# Patient Record
Sex: FEMALE | Race: WHITE | NOT HISPANIC OR LATINO | Employment: UNEMPLOYED | URBAN - METROPOLITAN AREA
[De-identification: names, ages, dates, MRNs, and addresses within clinical notes are randomized per-mention and may not be internally consistent; named-entity substitution may affect disease eponyms.]

---

## 2015-12-30 LAB
EXTERNAL HIV CONFIRMATION: NORMAL
EXTERNAL HIV SCREEN: NORMAL

## 2017-05-02 ENCOUNTER — GENERIC CONVERSION - ENCOUNTER (OUTPATIENT)
Dept: OTHER | Facility: OTHER | Age: 57
End: 2017-05-02

## 2017-05-03 ENCOUNTER — GENERIC CONVERSION - ENCOUNTER (OUTPATIENT)
Dept: OTHER | Facility: OTHER | Age: 57
End: 2017-05-03

## 2017-05-08 ENCOUNTER — GENERIC CONVERSION - ENCOUNTER (OUTPATIENT)
Dept: OTHER | Facility: OTHER | Age: 57
End: 2017-05-08

## 2017-12-12 ENCOUNTER — GENERIC CONVERSION - ENCOUNTER (OUTPATIENT)
Dept: INTERNAL MEDICINE CLINIC | Facility: CLINIC | Age: 57
End: 2017-12-12

## 2018-01-02 ENCOUNTER — GENERIC CONVERSION - ENCOUNTER (OUTPATIENT)
Dept: INTERNAL MEDICINE CLINIC | Facility: CLINIC | Age: 58
End: 2018-01-02

## 2018-01-11 NOTE — MISCELLANEOUS
Chief Complaint  Chief Complaint Free Text Note Form: This patient does not qualify for a THOMAS because she was not in the hospital for 24 hours  Active Problems    1  Atrial fibrillation (427 31) (I48 91)   2  Benign essential hypertension (401 1) (I10)   3  Chronic anticoagulation (V58 61) (Z79 01)   4  Daytime somnolence (780 54) (R40 0)   5  Diabetes mellitus type 2, uncontrolled (250 02) (E11 65)   6  Encounter for screening mammogram for malignant neoplasm of breast (V76 12)   (Z12 31)   7  Need for influenza vaccination (V04 81) (Z23)   8  Need for Tdap vaccination (V06 1) (Z23)   9  Snoring (786 09) (R06 83)    Surgical History    1  History of Hysterectomy    Family History  Mother    1  Family history of hypertension (V17 49) (Z82 49)  Father    2  Family history of coronary artery disease (V17 3) (Z82 49)   3  Family history of diabetes mellitus (V18 0) (Z83 3)   4  Family history of hypertension (V17 49) (Z82 49)    Social History    · Current some day smoker (305 1) (F17 200)   · Rarely consumes alcohol (V49 89) (Z78 9)    Current Meds   1  No Reported Medications Recorded    Allergies    1   No Known Drug Allergies    Signatures   Electronically signed by : Yajaira Rowland, ; May  8 2017 11:35AM EST                       (Author)

## 2018-01-11 NOTE — MISCELLANEOUS
Provider Comments  Provider Comments:   Pt was a n/s  LMOM to call back and make new appt        Signatures   Electronically signed by : GIANLUCA Woods; Sep 14 2016 12:08PM EST                       (Author)

## 2018-01-13 NOTE — MISCELLANEOUS
Chief Complaint  Chief Complaint Free Text Note Form: The patient declined to make a THOMAS appointment because she is following up with her cardiologist       Active Problems    1  Atrial fibrillation (427 31) (I48 91)   2  Benign essential hypertension (401 1) (I10)   3  Chronic anticoagulation (V58 61) (Z79 01)   4  Daytime somnolence (780 54) (R40 0)   5  Diabetes mellitus type 2, uncontrolled (250 02) (E11 65)   6  Encounter for screening mammogram for malignant neoplasm of breast (V76 12)   (Z12 31)   7  Need for influenza vaccination (V04 81) (Z23)   8  Need for Tdap vaccination (V06 1) (Z23)   9  Snoring (786 09) (R06 83)    Surgical History    1  History of Hysterectomy    Family History  Mother    1  Family history of hypertension (V17 49) (Z82 49)  Father    2  Family history of coronary artery disease (V17 3) (Z82 49)   3  Family history of diabetes mellitus (V18 0) (Z83 3)   4  Family history of hypertension (V17 49) (Z82 49)    Social History    · Current some day smoker (305 1) (F17 210)   · Rarely consumes alcohol (V49 89) (Z78 9)    Current Meds   1  No Reported Medications Recorded    Allergies    1   No Known Drug Allergies    Signatures   Electronically signed by : Frances Hendricks, ; Oct  7 2016  9:19AM EST                       (Author)    Electronically signed by : EDMUNDO Palma ; Oct  7 2016 10:58AM EST                       (Author)

## 2018-01-13 NOTE — MISCELLANEOUS
Chief Complaint  Chief Complaint Free Text Note Form: No THOMAS was made for this patient because she never returned my three phone calls  Active Problems    1  Atrial fibrillation (427 31) (I48 91)   2  Benign essential hypertension (401 1) (I10)   3  Chronic anticoagulation (V58 61) (Z79 01)   4  Daytime somnolence (780 54) (R40 0)   5  Diabetes mellitus type 2, uncontrolled (250 02) (E11 65)   6  Encounter for screening mammogram for malignant neoplasm of breast (V76 12)   (Z12 31)   7  Need for influenza vaccination (V04 81) (Z23)   8  Need for Tdap vaccination (V06 1) (Z23)   9  Snoring (786 09) (R06 83)    Surgical History    1  History of Hysterectomy    Family History  Mother    1  Family history of hypertension (V17 49) (Z82 49)  Father    2  Family history of coronary artery disease (V17 3) (Z82 49)   3  Family history of diabetes mellitus (V18 0) (Z83 3)   4  Family history of hypertension (V17 49) (Z82 49)    Social History    · Current some day smoker (305 1) (F17 210)   · Rarely consumes alcohol (V49 89) (Z78 9)    Current Meds   1  No Reported Medications Recorded    Allergies    1   No Known Drug Allergies    Future Appointments    Date/Time Provider Specialty Site   09/13/2016 02:30 PM Radha Robles [facility]     Signatures   Electronically signed by : Ruth Ann Neal, ; Aug  9 2016  4:06PM EST                       (Author)    Electronically signed by : EDMUNDO Lucas ; Aug 10 2016  5:56PM EST                       (Author)

## 2018-01-16 NOTE — MISCELLANEOUS
History of Present Illness  TCM Communication [facility]: The patient is being contacted for follow-up after hospitalization  Hospital records were reviewed  She was hospitalized at [facility]  The date of admission: 09/22/2016, date of discharge: 09/23/2016  Diagnosis: Persistant atrial fibrillation  She was discharged to home  Medications were not reviewed today  She scheduled a follow up appointment  The patient is currently asymptomatic  Counseling was provided to the patient  Communication performed and completed by Creasie Jeans      Active Problems    1  Atrial fibrillation (427 31) (I48 91)   2  Benign essential hypertension (401 1) (I10)   3  Chronic anticoagulation (V58 61) (Z79 01)   4  Daytime somnolence (780 54) (R40 0)   5  Diabetes mellitus type 2, uncontrolled (250 02) (E11 65)   6  Encounter for screening mammogram for malignant neoplasm of breast (V76 12)   (Z12 31)   7  Need for influenza vaccination (V04 81) (Z23)   8  Need for Tdap vaccination (V06 1) (Z23)   9  Snoring (786 09) (R06 83)    Surgical History    1  History of Hysterectomy    Family History  Mother    1  Family history of hypertension (V17 49) (Z82 49)  Father    2  Family history of coronary artery disease (V17 3) (Z82 49)   3  Family history of diabetes mellitus (V18 0) (Z83 3)   4  Family history of hypertension (V17 49) (Z82 49)    Social History    · Current some day smoker (305 1) (F17 210)   · Rarely consumes alcohol (V49 89) (Z78 9)    Current Meds   1  No Reported Medications Recorded    Allergies    1   No Known Drug Allergies    Future Appointments    Date/Time Provider Specialty Site   09/30/2016 11:00 AM Radha [place] [facility]     Signatures   Electronically signed by : Creasie Jeans, ; Sep 26 2016 12:02PM EST                       (Author)

## 2018-10-29 ENCOUNTER — TELEPHONE (OUTPATIENT)
Dept: FAMILY MEDICINE CLINIC | Facility: CLINIC | Age: 58
End: 2018-10-29

## 2018-10-29 LAB — HCV AB SER-ACNC: NEGATIVE

## 2018-10-29 NOTE — TELEPHONE ENCOUNTER
PATIENT IS  [facility] AND HAD SURGERY AT [facility]  HAS A  WOUND WRAP FOR  INFECTION IN HER GROIN  PATIENT STATED SHE IS GOING TO NEED A PRESCRIPTION FOR A LIFT CHAIR, WALKER AND SHOWER CHAIR WHEN SHE IS DISCHARGED HOPEFULLY IN THE NEXT 2 DAYS  INSURANCE IS [employer]     SPOUSE NUMBER JUST IN CASE   [phone]  EMRE IS REQUESTING A PERSONAL CALL FROM YOU TO GO OVER SOMETHINGS BEFORE SHE IS D/C     PLEASE ADVISE

## 2018-11-02 NOTE — TELEPHONE ENCOUNTER
Can you call her and tell her the  there will order all the supplies and the provider who took care of her thetre would order  I can call her when I get back on Monday but please let her know rehab there will arrange the discharge plan stuff   reji

## 2018-11-05 ENCOUNTER — TELEPHONE (OUTPATIENT)
Dept: FAMILY MEDICINE CLINIC | Facility: CLINIC | Age: 58
End: 2018-11-05

## 2018-11-06 ENCOUNTER — OFFICE VISIT (OUTPATIENT)
Dept: FAMILY MEDICINE CLINIC | Facility: CLINIC | Age: 58
End: 2018-11-06
Payer: COMMERCIAL

## 2018-11-06 VITALS
SYSTOLIC BLOOD PRESSURE: 130 MMHG | HEIGHT: 63 IN | WEIGHT: 204.6 LBS | OXYGEN SATURATION: 98 % | DIASTOLIC BLOOD PRESSURE: 82 MMHG | RESPIRATION RATE: 16 BRPM | BODY MASS INDEX: 36.25 KG/M2 | HEART RATE: 67 BPM | TEMPERATURE: 97.8 F

## 2018-11-06 DIAGNOSIS — M72.6 NECROTIZING FASCIITIS (HCC): ICD-10-CM

## 2018-11-06 DIAGNOSIS — Z79.01 CHRONIC ANTICOAGULATION: ICD-10-CM

## 2018-11-06 DIAGNOSIS — B37.9 CANDIDIASIS: Primary | ICD-10-CM

## 2018-11-06 DIAGNOSIS — I50.32 CHRONIC DIASTOLIC HEART FAILURE (HCC): ICD-10-CM

## 2018-11-06 DIAGNOSIS — I10 ESSENTIAL HYPERTENSION: ICD-10-CM

## 2018-11-06 DIAGNOSIS — I48.19 PERSISTENT ATRIAL FIBRILLATION (HCC): ICD-10-CM

## 2018-11-06 PROBLEM — R41.3 MEMORY LOSS: Status: ACTIVE | Noted: 2018-07-10

## 2018-11-06 PROBLEM — Z79.899 ON AMIODARONE THERAPY: Status: ACTIVE | Noted: 2017-05-02

## 2018-11-06 PROBLEM — R53.81 DEBILITY: Status: ACTIVE | Noted: 2018-10-26

## 2018-11-06 PROBLEM — R20.8 VIBRATION SENSORY LOSS: Status: ACTIVE | Noted: 2018-07-10

## 2018-11-06 PROBLEM — L03.90 CELLULITIS: Status: ACTIVE | Noted: 2018-10-14

## 2018-11-06 PROCEDURE — 99214 OFFICE O/P EST MOD 30 MIN: CPT | Performed by: NURSE PRACTITIONER

## 2018-11-06 PROCEDURE — 3725F SCREEN DEPRESSION PERFORMED: CPT | Performed by: NURSE PRACTITIONER

## 2018-11-06 PROCEDURE — 3079F DIAST BP 80-89 MM HG: CPT | Performed by: NURSE PRACTITIONER

## 2018-11-06 PROCEDURE — 3075F SYST BP GE 130 - 139MM HG: CPT | Performed by: NURSE PRACTITIONER

## 2018-11-06 PROCEDURE — 3008F BODY MASS INDEX DOCD: CPT | Performed by: NURSE PRACTITIONER

## 2018-11-06 RX ORDER — NYSTATIN 100000 [USP'U]/G
POWDER TOPICAL 3 TIMES DAILY
Qty: 45 G | Refills: 2 | Status: SHIPPED | OUTPATIENT
Start: 2018-11-06 | End: 2019-10-16 | Stop reason: ALTCHOICE

## 2018-11-06 RX ORDER — LANCETS
EACH MISCELLANEOUS
COMMUNITY
Start: 2016-08-07 | End: 2018-12-20 | Stop reason: SDUPTHER

## 2018-11-06 RX ORDER — METOPROLOL TARTRATE 50 MG/1
25 TABLET, FILM COATED ORAL 2 TIMES DAILY
COMMUNITY
Start: 2018-07-02

## 2018-11-06 RX ORDER — ATORVASTATIN CALCIUM 80 MG/1
80 TABLET, FILM COATED ORAL
COMMUNITY
Start: 2017-05-03

## 2018-11-06 RX ORDER — BLOOD SUGAR DIAGNOSTIC
STRIP MISCELLANEOUS
COMMUNITY
Start: 2016-08-07 | End: 2018-12-11 | Stop reason: SDUPTHER

## 2018-11-06 RX ORDER — ACETAMINOPHEN 500 MG
500 TABLET ORAL EVERY 6 HOURS PRN
COMMUNITY
Start: 2018-10-31

## 2018-11-06 RX ORDER — FUROSEMIDE 40 MG/1
40 TABLET ORAL
COMMUNITY
Start: 2018-04-12

## 2018-11-06 RX ORDER — LISINOPRIL 20 MG/1
20 TABLET ORAL
COMMUNITY
Start: 2018-04-12

## 2018-11-06 RX ORDER — AMLODIPINE BESYLATE 5 MG/1
10 TABLET ORAL
COMMUNITY
Start: 2018-10-31 | End: 2019-10-16 | Stop reason: SDUPTHER

## 2018-11-06 RX ORDER — AMIODARONE HYDROCHLORIDE 200 MG/1
200 TABLET ORAL
COMMUNITY
Start: 2017-11-27 | End: 2019-02-18

## 2018-11-06 RX ORDER — OXYCODONE HYDROCHLORIDE 5 MG/1
TABLET ORAL
COMMUNITY
Start: 2018-10-31 | End: 2019-02-18

## 2018-11-06 NOTE — PROGRESS NOTES
Assessment/Plan:           Problem List Items Addressed This Visit        Cardiovascular and Mediastinum    Chronic diastolic heart failure (HCC)    Relevant Medications    amLODIPine (NORVASC) 5 mg tablet    metoprolol tartrate (LOPRESSOR) 50 mg tablet    Essential hypertension    Relevant Medications    amLODIPine (NORVASC) 5 mg tablet    furosemide (LASIX) 40 mg tablet    lisinopril (ZESTRIL) 20 mg tablet    metoprolol tartrate (LOPRESSOR) 50 mg tablet    Persistent atrial fibrillation (HCC)    Relevant Medications    amLODIPine (NORVASC) 5 mg tablet    metoprolol tartrate (LOPRESSOR) 50 mg tablet       Musculoskeletal and Integument    Necrotizing fasciitis (HCC)       Other    Chronic anticoagulation      Other Visit Diagnoses     Candidiasis    -  Primary    Relevant Medications    nystatin (MYCOSTATIN) powder            Subjective:      Patient ID: Dano Rodrigues is a 66 y o  female  Here for hospital follow up  Had necrotizing fasciitis with debridement  Has appt with   trauma center on [address] to manage her abd wound  Needs order for nystatin powder   Right groin infection with vac dressing  Had been ventilated  For one week per pt  Went to [facility] for rehab and now home  vna coming into the home for vac care and wound care  Physical therapy came and she is done with them, not using walker any more          The following portions of the patient's history were reviewed and updated as appropriate: allergies, current medications, past family history, past medical history, past social history, past surgical history and problem list     Review of Systems   Constitutional: Positive for fatigue  Negative for fever  HENT: Negative for congestion and ear pain  Eyes: Negative for photophobia and visual disturbance  Respiratory: Negative for shortness of breath and wheezing  Cardiovascular: Negative for chest pain and palpitations  Gastrointestinal: Negative for diarrhea, nausea and vomiting  Endocrine: Negative for polydipsia, polyphagia and polyuria  Genitourinary: Negative for dysuria, frequency and hematuria  Musculoskeletal: Negative for back pain and myalgias  Skin: Positive for rash  Neurological: Negative for dizziness, light-headedness and headaches  Hematological: Negative for adenopathy  Psychiatric/Behavioral: Negative for dysphoric mood and sleep disturbance  The patient is not nervous/anxious  Objective:      /82 (BP Location: Right arm, Patient Position: Sitting, Cuff Size: Large)   Pulse 67   Temp 97 8 °F (36 6 °C) (Tympanic)   Resp 16   Ht 5' 3" (1 6 m)   Wt 92 8 kg (204 lb 9 6 oz)   SpO2 98%   BMI 36 24 kg/m²     Family History   Problem Relation Age of Onset    Hypertension Mother     Coronary artery disease Father     Diabetes Father     Hypertension Father      History reviewed  No pertinent past medical history  Social History     Social History    Marital status:      Spouse name: N/A    Number of children: N/A    Years of education: N/A     Occupational History    Not on file  Social History Main Topics    Smoking status: Current Every Day Smoker    Smokeless tobacco: Not on file    Alcohol use Yes      Comment: Rarely consumes     Drug use: Unknown    Sexual activity: Not on file     Other Topics Concern    Not on file     Social History Narrative    No narrative on file       Current Outpatient Prescriptions:     ACCU-CHEK FASTCLIX LANCETS MISC, Lancets for lancing device with Accu-chek Radha plus meter  Test 2XD   E 11 65, Disp: , Rfl:     acetaminophen (TYLENOL) 500 mg tablet, Take 500 mg by mouth every 6 (six) hours as needed, Disp: , Rfl:     amiodarone 200 mg tablet, Take 200 mg by mouth, Disp: , Rfl:     amLODIPine (NORVASC) 5 mg tablet, Take 10 mg by mouth, Disp: , Rfl:     apixaban (ELIQUIS) 5 mg, Take 5 mg by mouth, Disp: , Rfl:     atorvastatin (LIPITOR) 80 mg tablet, Take 80 mg by mouth, Disp: , Rfl:   furosemide (LASIX) 40 mg tablet, Take 40 mg by mouth, Disp: , Rfl:     glucose blood (ACCU-CHEK MONICA PLUS) test strip, Use with Accu-chek monica plus meter  Test 2XD  E11 65, Disp: , Rfl:     insulin glargine (BASAGLAR KWIKPEN) 100 units/mL injection pen, Inject 25 Units under the skin, Disp: , Rfl:     insulin lispro (HumaLOG) 100 units/mL injection, Inject 5 Units under the skin, Disp: , Rfl:     Insulin Syringe-Needle U-100 31G X 5/16" 0 3 ML MISC, Use with insulin 1XD  , Disp: , Rfl:     lisinopril (ZESTRIL) 20 mg tablet, Take 20 mg by mouth, Disp: , Rfl:     metoprolol tartrate (LOPRESSOR) 50 mg tablet, Take 75 mg by mouth, Disp: , Rfl:     oxyCODONE (ROXICODONE) 5 mg immediate release tablet, Take one tab every 4 hours as needed for moderate to severe pain  (Use on vac change days), Disp: , Rfl:   No Known Allergies     Physical Exam   Constitutional: She is oriented to person, place, and time  She appears well-developed and well-nourished  HENT:   Head: Normocephalic and atraumatic  Right Ear: External ear normal    Left Ear: External ear normal    Nose: Nose normal    Mouth/Throat: Oropharynx is clear and moist    Eyes: Conjunctivae are normal    Neck: Normal range of motion  Neck supple  No thyromegaly present  Cardiovascular: Normal rate, regular rhythm and normal heart sounds  Pulmonary/Chest: Effort normal and breath sounds normal  No respiratory distress  Abdominal: Soft  Bowel sounds are normal  She exhibits no distension  Musculoskeletal: Normal range of motion  Neurological: She is alert and oriented to person, place, and time  Skin:        Wound vac applied  Minor macerated tissue noted     Nursing note and vitals reviewed

## 2018-11-12 DIAGNOSIS — E11.65 TYPE 2 DIABETES MELLITUS WITH HYPERGLYCEMIA, WITHOUT LONG-TERM CURRENT USE OF INSULIN (HCC): Primary | ICD-10-CM

## 2018-12-11 ENCOUNTER — OFFICE VISIT (OUTPATIENT)
Dept: FAMILY MEDICINE CLINIC | Facility: CLINIC | Age: 58
End: 2018-12-11
Payer: COMMERCIAL

## 2018-12-11 VITALS
HEART RATE: 80 BPM | TEMPERATURE: 96.5 F | DIASTOLIC BLOOD PRESSURE: 78 MMHG | SYSTOLIC BLOOD PRESSURE: 116 MMHG | WEIGHT: 205 LBS | HEIGHT: 63 IN | RESPIRATION RATE: 16 BRPM | OXYGEN SATURATION: 97 % | BODY MASS INDEX: 36.32 KG/M2

## 2018-12-11 DIAGNOSIS — Z12.11 SCREENING FOR COLON CANCER: ICD-10-CM

## 2018-12-11 DIAGNOSIS — Z79.4 TYPE 2 DIABETES MELLITUS WITH OTHER CIRCULATORY COMPLICATION, WITH LONG-TERM CURRENT USE OF INSULIN (HCC): ICD-10-CM

## 2018-12-11 DIAGNOSIS — E13.8 DIABETES MELLITUS OF OTHER TYPE WITH COMPLICATION, UNSPECIFIED WHETHER LONG TERM INSULIN USE: Primary | ICD-10-CM

## 2018-12-11 DIAGNOSIS — I48.0 PAROXYSMAL ATRIAL FIBRILLATION (HCC): ICD-10-CM

## 2018-12-11 DIAGNOSIS — I10 BENIGN ESSENTIAL HYPERTENSION: Primary | ICD-10-CM

## 2018-12-11 DIAGNOSIS — I10 ESSENTIAL HYPERTENSION: ICD-10-CM

## 2018-12-11 DIAGNOSIS — E11.59 TYPE 2 DIABETES MELLITUS WITH OTHER CIRCULATORY COMPLICATION, WITH LONG-TERM CURRENT USE OF INSULIN (HCC): ICD-10-CM

## 2018-12-11 PROBLEM — L03.90 CELLULITIS: Status: RESOLVED | Noted: 2018-10-14 | Resolved: 2018-12-11

## 2018-12-11 PROBLEM — R53.81 DEBILITY: Status: RESOLVED | Noted: 2018-10-26 | Resolved: 2018-12-11

## 2018-12-11 PROBLEM — M72.6 NECROTIZING FASCIITIS (HCC): Status: RESOLVED | Noted: 2018-10-15 | Resolved: 2018-12-11

## 2018-12-11 PROCEDURE — 3008F BODY MASS INDEX DOCD: CPT | Performed by: NURSE PRACTITIONER

## 2018-12-11 PROCEDURE — 3078F DIAST BP <80 MM HG: CPT | Performed by: NURSE PRACTITIONER

## 2018-12-11 PROCEDURE — 3074F SYST BP LT 130 MM HG: CPT | Performed by: NURSE PRACTITIONER

## 2018-12-11 PROCEDURE — 99214 OFFICE O/P EST MOD 30 MIN: CPT | Performed by: NURSE PRACTITIONER

## 2018-12-11 RX ORDER — ATORVASTATIN CALCIUM 80 MG/1
TABLET, FILM COATED ORAL
COMMUNITY
Start: 2018-12-03 | End: 2019-02-18

## 2018-12-11 RX ORDER — BLOOD SUGAR DIAGNOSTIC
STRIP MISCELLANEOUS AS NEEDED
Qty: 180 EACH | Refills: 3 | Status: SHIPPED | OUTPATIENT
Start: 2018-12-11 | End: 2019-08-28

## 2018-12-11 RX ORDER — AMIODARONE HYDROCHLORIDE 200 MG/1
200 TABLET ORAL
COMMUNITY
Start: 2018-12-03 | End: 2019-02-18

## 2018-12-11 NOTE — PROGRESS NOTES
Assessment/Plan:         Problem List Items Addressed This Visit        Endocrine    Diabetes mellitus with circulatory complication (HCC)     No results found for: HGBA1C    No results for input(s): POCGLU in the last 72 hours  Blood Sugar Average: Last 72 hrs:  cont current meds  labs              Cardiovascular and Mediastinum    Atrial fibrillation (HCC)     Cont current meds  Rate controlled           Benign essential hypertension - Primary    Essential hypertension     Controlled cont current meds                   Subjective:      Patient ID: Gutierrez Izquierdo is a 66 y o  female  Here for f/u to hx of wound from necrotizing fasciitis  Has a wound vac still, follows with surgery and has home care nurse  Hope to get removed next week, to see surgery next week  Doing well  Diabetes controlled, states am bs around 120 on average  No fevers  Feels well  More energy           The following portions of the patient's history were reviewed and updated as appropriate: allergies, current medications, past family history, past medical history, past social history, past surgical history and problem list     Review of Systems   Constitutional: Negative for activity change, appetite change, fatigue and fever  HENT: Negative for congestion, postnasal drip, rhinorrhea, sinus pain and sinus pressure  Eyes: Negative for photophobia and visual disturbance  Respiratory: Negative for cough, shortness of breath and wheezing  Cardiovascular: Negative for chest pain and palpitations  Gastrointestinal: Negative for abdominal distention, abdominal pain, constipation, diarrhea, nausea and vomiting  Endocrine: Negative for polydipsia, polyphagia and polyuria  Genitourinary: Negative for dysuria, frequency and hematuria  Musculoskeletal: Negative for arthralgias, myalgias and neck stiffness  Skin: Negative for rash  Neurological: Negative for dizziness, light-headedness and headaches     Hematological: Negative for adenopathy  Psychiatric/Behavioral: Negative for agitation, decreased concentration and suicidal ideas  The patient is not nervous/anxious and is not hyperactive  Objective:      /78 (BP Location: Left arm, Patient Position: Sitting, Cuff Size: Standard)   Pulse 80   Temp (!) 96 5 °F (35 8 °C)   Resp 16   Ht 5' 3" (1 6 m)   Wt 93 kg (205 lb)   SpO2 97%   BMI 36 31 kg/m²     Family History   Problem Relation Age of Onset    Hypertension Mother     Coronary artery disease Father     Diabetes Father     Hypertension Father      No past medical history on file  Social History     Social History    Marital status:      Spouse name: N/A    Number of children: N/A    Years of education: N/A     Occupational History    Not on file  Social History Main Topics    Smoking status: Current Every Day Smoker    Smokeless tobacco: Not on file    Alcohol use Yes      Comment: Rarely consumes     Drug use: Unknown    Sexual activity: Not on file     Other Topics Concern    Not on file     Social History Narrative    No narrative on file       Current Outpatient Prescriptions:     ACCU-CHEK FASTCLIX LANCETS MISC, Lancets for lancing device with Accu-chek Jose G plus meter  Test 2XD   E 11 65, Disp: , Rfl:     acetaminophen (TYLENOL) 500 mg tablet, Take 500 mg by mouth every 6 (six) hours as needed, Disp: , Rfl:     amiodarone 200 mg tablet, Take 200 mg by mouth, Disp: , Rfl:     amiodarone 200 mg tablet, Take 200 mg by mouth, Disp: , Rfl:     amLODIPine (NORVASC) 5 mg tablet, Take 10 mg by mouth, Disp: , Rfl:     apixaban (ELIQUIS) 5 mg, Take 5 mg by mouth, Disp: , Rfl:     atorvastatin (LIPITOR) 80 mg tablet, Take 80 mg by mouth, Disp: , Rfl:     atorvastatin (LIPITOR) 80 mg tablet, TAKE ONE TABLET BY MOUTH NIGHTLY, Disp: , Rfl:     furosemide (LASIX) 40 mg tablet, Take 40 mg by mouth, Disp: , Rfl:     glucose blood (ACCU-CHEK JOSE G PLUS) test strip, Use with Accu-248 [employer] jose g plus meter  Test 2XD  E11 65, Disp: , Rfl:     glucose blood test strip, Use as instructed, Disp: 100 each, Rfl: 5    insulin glargine (BASAGLAR KWIKPEN) 100 units/mL injection pen, Inject 25 Units under the skin, Disp: , Rfl:     insulin lispro (HumaLOG) 100 units/mL injection, Inject 5 Units under the skin, Disp: , Rfl:     Insulin Syringe-Needle U-100 31G X 5/16" 0 3 ML MISC, Use with insulin 1XD  , Disp: , Rfl:     lisinopril (ZESTRIL) 20 mg tablet, Take 20 mg by mouth, Disp: , Rfl:     metoprolol tartrate (LOPRESSOR) 50 mg tablet, Take 75 mg by mouth, Disp: , Rfl:     nystatin (MYCOSTATIN) powder, Apply topically 3 (three) times a day, Disp: 45 g, Rfl: 2    oxyCODONE (ROXICODONE) 5 mg immediate release tablet, Take one tab every 4 hours as needed for moderate to severe pain  (Use on vac change days), Disp: , Rfl:   No Known Allergies     Physical Exam   Constitutional: She is oriented to person, place, and time  She appears well-developed and well-nourished  HENT:   Head: Normocephalic and atraumatic  Right Ear: External ear normal    Left Ear: External ear normal    Nose: Nose normal    Mouth/Throat: Oropharynx is clear and moist    Eyes: Pupils are equal, round, and reactive to light  Conjunctivae are normal    Neck: Normal range of motion  Neck supple  No thyromegaly present  Cardiovascular: Normal rate, regular rhythm, normal heart sounds and intact distal pulses  Pulmonary/Chest: Effort normal and breath sounds normal    Abdominal: Soft  Bowel sounds are normal        Musculoskeletal: Normal range of motion  Neurological: She is alert and oriented to person, place, and time  Skin: Skin is warm and dry  Wound vac intact  Groin no erythema, edema     Psychiatric: She has a normal mood and affect  Her behavior is normal  Judgment and thought content normal    Nursing note and vitals reviewed

## 2018-12-11 NOTE — ASSESSMENT & PLAN NOTE
No results found for: HGBA1C    No results for input(s): POCGLU in the last 72 hours      Blood Sugar Average: Last 72 hrs:  cont current meds  labs

## 2018-12-20 DIAGNOSIS — E11.9 TYPE 2 DIABETES MELLITUS WITHOUT COMPLICATION, WITHOUT LONG-TERM CURRENT USE OF INSULIN (HCC): Primary | ICD-10-CM

## 2018-12-20 RX ORDER — LANCETS
EACH MISCELLANEOUS DAILY
Qty: 102 EACH | Refills: 11 | Status: SHIPPED | OUTPATIENT
Start: 2018-12-20 | End: 2018-12-24 | Stop reason: ALTCHOICE

## 2018-12-24 DIAGNOSIS — E11.59 TYPE 2 DIABETES MELLITUS WITH OTHER CIRCULATORY COMPLICATION, WITHOUT LONG-TERM CURRENT USE OF INSULIN (HCC): Primary | ICD-10-CM

## 2018-12-24 RX ORDER — LANCETS
EACH MISCELLANEOUS
Qty: 100 EACH | Refills: 5 | Status: SHIPPED | OUTPATIENT
Start: 2018-12-24 | End: 2019-08-28 | Stop reason: SDUPTHER

## 2018-12-24 NOTE — TELEPHONE ENCOUNTER
Ivan FASTCLIX Lancets were sent to [facility] with a dose of once daily  Samara Dakin did pick them up NOT knowing they were the wrong ones  She asked if you could please send:    IVAN "[address] " LANCETS, to be used b i d , #100  To [facility] in her chart    She only has 2 left  Thank you!

## 2018-12-26 ENCOUNTER — TELEPHONE (OUTPATIENT)
Dept: FAMILY MEDICINE CLINIC | Facility: CLINIC | Age: 58
End: 2018-12-26

## 2018-12-26 NOTE — TELEPHONE ENCOUNTER
Patient spoke with insurance company, they did an override and everything is supposed to be good with [address]

## 2018-12-26 NOTE — TELEPHONE ENCOUNTER
Patient called saying [facility] never received the Accu-check SOFTCLIX lancets  I called [facility] and they notified me since she already picked up the wrong lancets (hardclix) that it is too soon for them to fill script  They said that a prior authorization may help  Please advise      Prior Auth given to [id number]

## 2019-02-12 PROBLEM — E66.812 CLASS 2 SEVERE OBESITY DUE TO EXCESS CALORIES WITH SERIOUS COMORBIDITY AND BODY MASS INDEX (BMI) OF 36.0 TO 36.9 IN ADULT (HCC): Status: ACTIVE | Noted: 2019-02-12

## 2019-02-12 PROBLEM — E66.01 CLASS 2 SEVERE OBESITY DUE TO EXCESS CALORIES WITH SERIOUS COMORBIDITY AND BODY MASS INDEX (BMI) OF 36.0 TO 36.9 IN ADULT (HCC): Status: ACTIVE | Noted: 2019-02-12

## 2019-02-18 ENCOUNTER — OFFICE VISIT (OUTPATIENT)
Dept: FAMILY MEDICINE CLINIC | Facility: CLINIC | Age: 59
End: 2019-02-18
Payer: COMMERCIAL

## 2019-02-18 VITALS
RESPIRATION RATE: 12 BRPM | DIASTOLIC BLOOD PRESSURE: 80 MMHG | HEIGHT: 63 IN | HEART RATE: 71 BPM | BODY MASS INDEX: 36 KG/M2 | OXYGEN SATURATION: 96 % | WEIGHT: 203.2 LBS | SYSTOLIC BLOOD PRESSURE: 124 MMHG

## 2019-02-18 DIAGNOSIS — Z12.11 COLON CANCER SCREENING: ICD-10-CM

## 2019-02-18 DIAGNOSIS — Z12.11 SCREENING FOR COLON CANCER: ICD-10-CM

## 2019-02-18 DIAGNOSIS — I50.32 CHRONIC DIASTOLIC HEART FAILURE (HCC): ICD-10-CM

## 2019-02-18 DIAGNOSIS — E08.51 DIABETES MELLITUS DUE TO UNDERLYING CONDITION WITH DIABETIC PERIPHERAL ANGIOPATHY WITHOUT GANGRENE, WITHOUT LONG-TERM CURRENT USE OF INSULIN (HCC): Primary | ICD-10-CM

## 2019-02-18 DIAGNOSIS — Z12.31 ENCOUNTER FOR SCREENING MAMMOGRAM FOR MALIGNANT NEOPLASM OF BREAST: ICD-10-CM

## 2019-02-18 DIAGNOSIS — E78.5 DYSLIPIDEMIA: ICD-10-CM

## 2019-02-18 DIAGNOSIS — I10 ESSENTIAL HYPERTENSION: ICD-10-CM

## 2019-02-18 DIAGNOSIS — E66.01 CLASS 2 SEVERE OBESITY DUE TO EXCESS CALORIES WITH SERIOUS COMORBIDITY AND BODY MASS INDEX (BMI) OF 36.0 TO 36.9 IN ADULT (HCC): ICD-10-CM

## 2019-02-18 PROBLEM — Z86.79 HISTORY OF ATRIAL FIBRILLATION: Status: ACTIVE | Noted: 2019-02-18

## 2019-02-18 PROCEDURE — 3079F DIAST BP 80-89 MM HG: CPT | Performed by: NURSE PRACTITIONER

## 2019-02-18 PROCEDURE — 3008F BODY MASS INDEX DOCD: CPT | Performed by: NURSE PRACTITIONER

## 2019-02-18 PROCEDURE — 99214 OFFICE O/P EST MOD 30 MIN: CPT | Performed by: NURSE PRACTITIONER

## 2019-02-18 PROCEDURE — 3074F SYST BP LT 130 MM HG: CPT | Performed by: NURSE PRACTITIONER

## 2019-02-18 RX ORDER — AMIODARONE HYDROCHLORIDE 200 MG/1
200 TABLET ORAL DAILY
COMMUNITY
End: 2020-03-20

## 2019-02-18 NOTE — ASSESSMENT & PLAN NOTE
No results found for: HGBA1C    No results for input(s): POCGLU in the last 72 hours      Blood Sugar Average: Last 72 hrs:    Cont current meds

## 2019-02-18 NOTE — PROGRESS NOTES
Assessment/Plan:           Problem List Items Addressed This Visit        Endocrine    Diabetes mellitus with circulatory complication ([facility] [address] ) - Primary     No results found for: HGBA1C    No results for input(s): POCGLU in the last 72 hours  Blood Sugar Average: Last 72 hrs:    Cont current meds           Relevant Orders    Comprehensive metabolic panel    HEMOGLOBIN A1C W/ EAG ESTIMATION    Lipid Panel with Direct LDL reflex    TSH, 3rd generation with Free T4 reflex       Cardiovascular and Mediastinum    Chronic diastolic heart failure (HCC)     Stable  Cont current meds           Essential hypertension     Cont current meds           Relevant Orders    Comprehensive metabolic panel    HEMOGLOBIN A1C W/ EAG ESTIMATION    Lipid Panel with Direct LDL reflex    TSH, 3rd generation with Free T4 reflex       Other    Class 2 severe obesity due to excess calories with serious comorbidity and body mass index (BMI) of 36 0 to 36 9 in [facility])    Dyslipidemia    Relevant Orders    Comprehensive metabolic panel    HEMOGLOBIN A1C W/ EAG ESTIMATION    Lipid Panel with Direct LDL reflex    TSH, 3rd generation with Free T4 reflex      Other Visit Diagnoses     Encounter for screening mammogram for malignant neoplasm of breast        Relevant Orders    Mammo screening bilateral w cad    Colon cancer screening        Relevant Orders    Cologuard    Screening for colon cancer        Relevant Orders    Ambulatory referral to Gastroenterology            Subjective:      Patient ID: Tae Mata is a 66 y o  female  Here for f/u to chronic medical conditions  Doing well with her diabetes  Labs reviewed, hgba1c 7 9, down from 13  Since her past illness she has changed her eating habits and lifestyle  Labs much improved    Diabetes   She presents for her follow-up diabetic visit  She has type 2 diabetes mellitus  No MedicAlert identification noted  Her disease course has been improving   There are no hypoglycemic associated symptoms  Pertinent negatives for hypoglycemia include no dizziness, headaches, nervousness/anxiousness or sweats  Pertinent negatives for diabetes include no blurred vision, no chest pain, no fatigue, no foot paresthesias, no foot ulcerations, no polydipsia, no polyphagia, no polyuria, no visual change, no weakness and no weight loss  There are no hypoglycemic complications  Symptoms are stable  There are no diabetic complications  Risk factors for coronary artery disease include diabetes mellitus, dyslipidemia, obesity, hypertension, post-menopausal and sedentary lifestyle  Current diabetic treatment includes insulin injections  She is compliant with treatment all of the time  Her weight is decreasing steadily  She is following a generally healthy diet  She has not had a previous visit with a dietitian  She rarely participates in exercise  Her home blood glucose trend is decreasing steadily  An ACE inhibitor/angiotensin II receptor blocker is being taken  She does not see a podiatrist Eye exam is current  Hypertension   This is a chronic problem  The current episode started more than 1 year ago  The problem is unchanged  The problem is controlled  Pertinent negatives include no anxiety, blurred vision, chest pain, headaches, malaise/fatigue, neck pain, orthopnea, palpitations, peripheral edema, PND, shortness of breath or sweats  There are no associated agents to hypertension  Risk factors for coronary artery disease include diabetes mellitus, dyslipidemia, obesity, sedentary lifestyle and post-menopausal state  Past treatments include ACE inhibitors  The current treatment provides significant improvement  There are no compliance problems          The following portions of the patient's history were reviewed and updated as appropriate: allergies, current medications, past family history, past medical history, past social history, past surgical history and problem list     Review of Systems   Constitutional: Negative for fatigue, malaise/fatigue and weight loss  HENT: Negative for congestion, postnasal drip, rhinorrhea and sinus pain  Eyes: Negative for blurred vision, photophobia and visual disturbance  Respiratory: Negative for cough, shortness of breath and wheezing  Cardiovascular: Negative for chest pain, palpitations, orthopnea and PND  Gastrointestinal: Negative for constipation, diarrhea, nausea and vomiting  Endocrine: Negative for polydipsia, polyphagia and polyuria  Genitourinary: Negative for dysuria, frequency and hematuria  Musculoskeletal: Negative for gait problem and neck pain  Skin: Negative for rash  Neurological: Negative for dizziness, weakness, light-headedness and headaches  Hematological: Negative for adenopathy  Psychiatric/Behavioral: Negative for decreased concentration and sleep disturbance  The patient is not nervous/anxious  Objective:      /80 (BP Location: Right arm, Patient Position: Sitting, Cuff Size: Standard)   Pulse 71   Resp 12   Ht 5' 3" (1 6 m)   Wt 92 2 kg (203 lb 3 2 oz)   SpO2 96%   BMI 36 00 kg/m²     Family History   Problem Relation Age of Onset    Hypertension Mother     Coronary artery disease Father     Diabetes Father     Hypertension Father      History reviewed  No pertinent past medical history    Social History     Socioeconomic History    Marital status:      Spouse name: Not on file    Number of children: Not on file    Years of education: Not on file    Highest education level: Not on file   Occupational History    Not on file   Social Needs    Financial resource strain: Not on file    Food insecurity:     Worry: Not on file     Inability: Not on file    Transportation needs:     Medical: Not on file     Non-medical: Not on file   Tobacco Use    Smoking status: Current Every Day Smoker    Smokeless tobacco: Current User   Substance and Sexual Activity    Alcohol use: Yes     Comment: Rarely consumes     Drug use: Not on file    Sexual activity: Not on file   Lifestyle    Physical activity:     Days per week: Not on file     Minutes per session: Not on file    Stress: Not on file   Relationships    Social connections:     Talks on phone: Not on file     Gets together: Not on file     Attends Oriental orthodox service: Not on file     Active member of club or organization: Not on file     Attends meetings of clubs or organizations: Not on file     Relationship status: Not on file    Intimate partner violence:     Fear of current or ex partner: Not on file     Emotionally abused: Not on file     Physically abused: Not on file     Forced sexual activity: Not on file   Other Topics Concern    Not on file   Social History Narrative    Not on file       Current Outpatient Medications:     amiodarone 200 mg tablet, Take 200 mg by mouth daily, Disp: , Rfl:     ACCU-CHEK SOFTCLIX LANCETS lancets, Test twice daily e11 9, Disp: 100 each, Rfl: 5    acetaminophen (TYLENOL) 500 mg tablet, Take 500 mg by mouth every 6 (six) hours as needed, Disp: , Rfl:     amLODIPine (NORVASC) 5 mg tablet, Take 10 mg by mouth, Disp: , Rfl:     apixaban (ELIQUIS) 5 mg, Take 5 mg by mouth, Disp: , Rfl:     atorvastatin (LIPITOR) 80 mg tablet, Take 80 mg by mouth, Disp: , Rfl:     furosemide (LASIX) 40 mg tablet, Take 40 mg by mouth, Disp: , Rfl:     glucose blood (ACCU-CHEK MONICA PLUS) test strip, Use with Accu-chek monica plus meter  Test 2XD  E11 65, Disp: , Rfl:     insulin glargine (BASAGLAR KWIKPEN) 100 units/mL injection pen, Inject 25 Units under the skin, Disp: , Rfl:     insulin lispro (HumaLOG) 100 units/mL injection, Inject 5 Units under the skin, Disp: , Rfl:     Insulin Syringe-Needle U-100 31G X 5/16" 0 3 ML MISC, by Other route as needed (daily as needed to test blood sugar) for up to 90 days, Disp: 180 each, Rfl: 3    Lancet Devices (ACCU-CHEK SOFTCLIX) lancets, To be used B  I D, Disp: 1 each, Rfl: 5   lisinopril (ZESTRIL) 20 mg tablet, Take 20 mg by mouth, Disp: , Rfl:     metoprolol tartrate (LOPRESSOR) 50 mg tablet, Take 75 mg by mouth, Disp: , Rfl:     nystatin (MYCOSTATIN) powder, Apply topically 3 (three) times a day, Disp: 45 g, Rfl: 2  No Known Allergies      Physical Exam   Constitutional: She is oriented to person, place, and time  She appears well-developed and well-nourished  HENT:   Head: Normocephalic and atraumatic  Right Ear: External ear normal    Left Ear: External ear normal    Nose: Nose normal    Mouth/Throat: Oropharynx is clear and moist    Eyes: Conjunctivae are normal    Neck: Neck supple  Cardiovascular: Normal rate, regular rhythm and normal heart sounds  Pulmonary/Chest: Effort normal and breath sounds normal    Abdominal: Soft  Bowel sounds are normal    Musculoskeletal: Normal range of motion  Neurological: She is alert and oriented to person, place, and time  Skin: Skin is warm and dry  Capillary refill takes less than 2 seconds  Psychiatric: She has a normal mood and affect  Her behavior is normal  Judgment and thought content normal    Nursing note and vitals reviewed  BMI Counseling: Body mass index is 36 kg/m²  Discussed the patient's BMI with her  The BMI is above average  BMI counseling and education was provided to the patient  Nutrition recommendations include reducing portion sizes, consuming healthier snacks, moderation in carbohydrate intake, increasing intake of lean protein, reducing intake of saturated fat and trans fat and reducing intake of cholesterol  Exercise recommendations include exercising 3-5 times per week, joining a gym and strength training exercises

## 2019-02-18 NOTE — PATIENT INSTRUCTIONS
Obesity   AMBULATORY CARE:   Obesity  is when your body mass index (BMI) is greater than 30  Your healthcare provider will use your height and weight to measure your BMI  The risks of obesity include  many health problems, such as injuries or physical disability  You may need tests to check for the following:  · Diabetes     · High blood pressure or high cholesterol     · Heart disease     · Gallbladder or liver disease     · Cancer of the colon, breast, prostate, liver, or kidney     · Sleep apnea     · Arthritis or gout  Seek care immediately if:   · You have a severe headache, confusion, or difficulty speaking  · You have weakness on one side of your body  · You have chest pain, sweating, or shortness of breath  Contact your healthcare provider if:   · You have symptoms of gallbladder or liver disease, such as pain in your upper abdomen  · You have knee or hip pain and discomfort while walking  · You have symptoms of diabetes, such as intense hunger and thirst, and frequent urination  · You have symptoms of sleep apnea, such as snoring or daytime sleepiness  · You have questions or concerns about your condition or care  Treatment for obesity  focuses on helping you lose weight to improve your health  Even a small decrease in BMI can reduce the risk for many health problems  Your healthcare provider will help you set a weight-loss goal   · Lifestyle changes  are the first step in treating obesity  These include making healthy food choices and getting regular physical activity  Your healthcare provider may suggest a weight-loss program that involves coaching, education, and therapy  · Medicine  may help you lose weight when it is used with a healthy diet and physical activity  · Surgery  can help you lose weight if you are very obese and have other health problems  There are several types of weight-loss surgery  Ask your healthcare provider for more information    Be successful losing weight:   · Set small, realistic goals  An example of a small goal is to walk for 20 minutes 5 days a week  Anther goal is to lose 5% of your body weight  · Tell friends, family members, and coworkers about your goals  and ask for their support  Ask a friend to lose weight with you, or join a weight-loss support group  · Identify foods or triggers that may cause you to overeat , and find ways to avoid them  Remove tempting high-calorie foods from your home and workplace  Place a bowl of fresh fruit on your kitchen counter  If stress causes you to eat, then find other ways to cope with stress  · Keep a diary to track what you eat and drink  Also write down how many minutes of physical activity you do each day  Weigh yourself once a week and record it in your diary  Eating changes: You will need to eat 500 to 1,000 fewer calories each day than you currently eat to lose 1 to 2 pounds a week  The following changes will help you cut calories:  · Eat smaller portions  Use small plates, no larger than 9 inches in diameter  Fill your plate half full of fruits and vegetables  Measure your food using measuring cups until you know what a serving size looks like  · Eat 3 meals and 1 or 2 snacks each day  Plan your meals in advance  Maikel Henriquez and eat at home most of the time  Eat slowly  · Eat fruits and vegetables at every meal   They are low in calories and high in fiber, which makes you feel full  Do not add butter, margarine, or cream sauce to vegetables  Use herbs to season steamed vegetables  · Eat less fat and fewer fried foods  Eat more baked or grilled chicken and fish  These protein sources are lower in calories and fat than red meat  Limit fast food  Dress your salads with olive oil and vinegar instead of bottled dressing  · Limit the amount of sugar you eat  Do not drink sugary beverages  Limit alcohol  Activity changes:  Physical activity is good for your body in many ways   It helps you burn calories and build strong muscles  It decreases stress and depression, and improves your mood  It can also help you sleep better  Talk to your healthcare provider before you begin an exercise program   · Exercise for at least 30 minutes 5 days a week  Start slowly  Set aside time each day for physical activity that you enjoy and that is convenient for you  It is best to do both weight training and an activity that increases your heart rate, such as walking, bicycling, or swimming  · Find ways to be more active  Do yard work and housecleaning  Walk up the stairs instead of using elevators  Spend your leisure time going to events that require walking, such as outdoor festivals or fairs  This extra physical activity can help you lose weight and keep it off  Follow up with your healthcare provider as directed: You may need to meet with a dietitian  Write down your questions so you remember to ask them during your visits  © 2017 [employer] Information is for End User's use only and may not be sold, redistributed or otherwise used for commercial purposes  All illustrations and images included in CareNotes® are the copyrighted property of [employer]  or Pete Davis  The above information is an  only  It is not intended as medical advice for individual conditions or treatments  Talk to your doctor, nurse or pharmacist before following any medical regimen to see if it is safe and effective for you  Weight Management   AMBULATORY CARE:   Why it is important to manage your weight:  Being overweight increases your risk of health conditions such as heart disease, high blood pressure, type 2 diabetes, and certain types of cancer  It can also increase your risk for osteoarthritis, sleep apnea, and other respiratory problems  Aim for a slow, steady weight loss  Even a small amount of weight loss can lower your risk of health problems    How to lose weight safely:  A safe and healthy way to lose weight is to eat fewer calories and get regular exercise  You can lose up about 1 pound a week by decreasing the number of calories you eat by 500 calories each day  You can decrease calories by eating smaller portion sizes or by cutting out high-calorie foods  Read labels to find out how many calories are in the foods you eat  You can also burn calories with exercise such as walking, swimming, or biking  You will be more likely to keep weight off if you make these changes part of your lifestyle  Healthy meal plan for weight management:  A healthy meal plan includes a variety of foods, contains fewer calories, and helps you stay healthy  A healthy meal plan includes the following:  · Eat whole-grain foods more often  A healthy meal plan should contain fiber  Fiber is the part of grains, fruits, and vegetables that is not broken down by your body  Whole-grain foods are healthy and provide extra fiber in your diet  Some examples of whole-grain foods are whole-wheat breads and pastas, oatmeal, brown rice, and bulgur  · Eat a variety of vegetables every day  Include dark, leafy greens such as spinach, kale, davy greens, and mustard greens  Eat yellow and orange vegetables such as carrots, sweet potatoes, and winter squash  · Eat a variety of fruits every day  Choose fresh or canned fruit (canned in its own juice or light syrup) instead of juice  Fruit juice has very little or no fiber  · Eat low-fat dairy foods  Drink fat-free (skim) milk or 1% milk  Eat fat-free yogurt and low-fat cottage cheese  Try low-fat cheeses such as mozzarella and other reduced-fat cheeses  · Choose meat and other protein foods that are low in fat  Choose beans or other legumes such as split peas or lentils  Choose fish, skinless poultry (chicken or turkey), or lean cuts of red meat (beef or pork)  Before you cook meat or poultry, cut off any visible fat  · Use less fat and oil  Try baking foods instead of frying them  Add less fat, such as margarine, sour cream, regular salad dressing and mayonnaise to foods  Eat fewer high-fat foods  Some examples of high-fat foods include french fries, doughnuts, ice cream, and cakes  · Eat fewer sweets  Limit foods and drinks that are high in sugar  This includes candy, cookies, regular soda, and sweetened drinks  Ways to decrease calories:   · Eat smaller portions  ¨ Use a small plate with smaller servings  ¨ Do not eat second helpings  ¨ When you eat at a restaurant, ask for a box and place half of your meal in the box before you eat  ¨ Share an entrée with someone else  · Replace high-calorie snacks with healthy, low-calorie snacks  ¨ Choose fresh fruit, vegetables, fat-free rice cakes, or air-popped popcorn instead of potato chips, nuts, or chocolate  ¨ Choose water or calorie-free drinks instead of soda or sweetened drinks  · Eat regular meals  Skipping meals can lead to overeating later in the day  Eat a healthy snack in place of a meal if you do not have time to eat a regular meal      · Do not shop for groceries when you are hungry  You may be more likely to make unhealthy food choices  Take a grocery list of healthy foods and shop after you have eaten  Exercise:  Exercise at least 30 minutes per day on most days of the week  Some examples of exercise include walking, biking, dancing, and swimming  You can also fit in more physical activity by taking the stairs instead of the elevator or parking farther away from stores  Ask your healthcare provider about the best exercise plan for you  Other things to consider as you try to lose weight:   · Be aware of situations that may give you the urge to overeat, such as eating while watching television  Find ways to avoid these situations  For example, read a book, go for a walk, or do crafts      · Meet with a weight loss support group or friends who are also trying to lose weight  This may help you stay motivated to continue working on your weight loss goals  © 2017 [employer] Information is for End User's use only and may not be sold, redistributed or otherwise used for commercial purposes  All illustrations and images included in CareNotes® are the copyrighted property of [employer]  or Pete Davis  The above information is an  only  It is not intended as medical advice for individual conditions or treatments  Talk to your doctor, nurse or pharmacist before following any medical regimen to see if it is safe and effective for you

## 2019-04-16 ENCOUNTER — OFFICE VISIT (OUTPATIENT)
Dept: FAMILY MEDICINE CLINIC | Facility: CLINIC | Age: 59
End: 2019-04-16
Payer: COMMERCIAL

## 2019-04-16 VITALS
OXYGEN SATURATION: 98 % | DIASTOLIC BLOOD PRESSURE: 82 MMHG | RESPIRATION RATE: 12 BRPM | HEIGHT: 63 IN | WEIGHT: 209.2 LBS | HEART RATE: 46 BPM | SYSTOLIC BLOOD PRESSURE: 118 MMHG | BODY MASS INDEX: 37.07 KG/M2

## 2019-04-16 DIAGNOSIS — E11.59 TYPE 2 DIABETES MELLITUS WITH OTHER CIRCULATORY COMPLICATION, WITH LONG-TERM CURRENT USE OF INSULIN (HCC): ICD-10-CM

## 2019-04-16 DIAGNOSIS — H10.32 ACUTE BACTERIAL CONJUNCTIVITIS OF LEFT EYE: ICD-10-CM

## 2019-04-16 DIAGNOSIS — E66.01 CLASS 2 SEVERE OBESITY DUE TO EXCESS CALORIES WITH SERIOUS COMORBIDITY AND BODY MASS INDEX (BMI) OF 36.0 TO 36.9 IN ADULT (HCC): ICD-10-CM

## 2019-04-16 DIAGNOSIS — L30.8 PSORIASIFORM DERMATITIS: ICD-10-CM

## 2019-04-16 DIAGNOSIS — I10 ESSENTIAL HYPERTENSION: Primary | ICD-10-CM

## 2019-04-16 DIAGNOSIS — I50.32 CHRONIC DIASTOLIC HEART FAILURE (HCC): ICD-10-CM

## 2019-04-16 DIAGNOSIS — Z79.4 TYPE 2 DIABETES MELLITUS WITH OTHER CIRCULATORY COMPLICATION, WITH LONG-TERM CURRENT USE OF INSULIN (HCC): ICD-10-CM

## 2019-04-16 DIAGNOSIS — B37.2 CANDIDAL INTERTRIGO: ICD-10-CM

## 2019-04-16 DIAGNOSIS — Z11.59 ENCOUNTER FOR HEPATITIS C SCREENING TEST FOR LOW RISK PATIENT: ICD-10-CM

## 2019-04-16 PROCEDURE — 99214 OFFICE O/P EST MOD 30 MIN: CPT | Performed by: NURSE PRACTITIONER

## 2019-04-16 PROCEDURE — 3074F SYST BP LT 130 MM HG: CPT | Performed by: NURSE PRACTITIONER

## 2019-04-16 PROCEDURE — 3008F BODY MASS INDEX DOCD: CPT | Performed by: NURSE PRACTITIONER

## 2019-04-16 PROCEDURE — 3079F DIAST BP 80-89 MM HG: CPT | Performed by: NURSE PRACTITIONER

## 2019-04-16 RX ORDER — MELOXICAM 15 MG/1
15 TABLET ORAL DAILY
COMMUNITY
End: 2019-10-16 | Stop reason: ALTCHOICE

## 2019-04-16 RX ORDER — NYSTATIN 100000 U/G
CREAM TOPICAL
Qty: 30 G | Refills: 3 | Status: SHIPPED | OUTPATIENT
Start: 2019-04-16 | End: 2019-10-16 | Stop reason: ALTCHOICE

## 2019-04-16 RX ORDER — TOBRAMYCIN 3 MG/ML
1 SOLUTION/ DROPS OPHTHALMIC
Qty: 5 ML | Refills: 0 | Status: SHIPPED | OUTPATIENT
Start: 2019-04-16 | End: 2019-10-16 | Stop reason: ALTCHOICE

## 2019-04-16 RX ORDER — PEN NEEDLE, DIABETIC 31 GX5/16"
NEEDLE, DISPOSABLE MISCELLANEOUS
Refills: 3 | COMMUNITY
Start: 2019-03-27 | End: 2019-08-28 | Stop reason: SDUPTHER

## 2019-04-16 RX ORDER — MOMETASONE FUROATE 1 MG/G
CREAM TOPICAL
Qty: 45 G | Refills: 1 | Status: SHIPPED | OUTPATIENT
Start: 2019-04-16 | End: 2019-10-16 | Stop reason: ALTCHOICE

## 2019-07-09 DIAGNOSIS — Z79.4 TYPE 2 DIABETES MELLITUS WITH OTHER CIRCULATORY COMPLICATION, WITH LONG-TERM CURRENT USE OF INSULIN (HCC): Primary | ICD-10-CM

## 2019-07-09 DIAGNOSIS — E11.59 TYPE 2 DIABETES MELLITUS WITH OTHER CIRCULATORY COMPLICATION, WITH LONG-TERM CURRENT USE OF INSULIN (HCC): Primary | ICD-10-CM

## 2019-07-09 NOTE — TELEPHONE ENCOUNTER
Refill,       insulin glargine (BASAGLAR KWIKPEN) 100 units/mL injection pen   Sig: Inject 25 Units under the skin          Patient also needs the Admelog 100 units refilled not sure if that's the Humalog or not      Si units 3 times a day before meals           Pharmacy is [facility] on [address]

## 2019-07-30 DIAGNOSIS — Z12.39 ENCOUNTER FOR SCREENING FOR MALIGNANT NEOPLASM OF BREAST: Primary | ICD-10-CM

## 2019-08-28 ENCOUNTER — OFFICE VISIT (OUTPATIENT)
Dept: FAMILY MEDICINE CLINIC | Facility: CLINIC | Age: 59
End: 2019-08-28
Payer: COMMERCIAL

## 2019-08-28 VITALS
BODY MASS INDEX: 38.73 KG/M2 | OXYGEN SATURATION: 97 % | SYSTOLIC BLOOD PRESSURE: 134 MMHG | HEART RATE: 59 BPM | HEIGHT: 63 IN | WEIGHT: 218.6 LBS | RESPIRATION RATE: 16 BRPM | DIASTOLIC BLOOD PRESSURE: 88 MMHG

## 2019-08-28 DIAGNOSIS — E78.5 DYSLIPIDEMIA: ICD-10-CM

## 2019-08-28 DIAGNOSIS — R42 DIZZINESS: ICD-10-CM

## 2019-08-28 DIAGNOSIS — E13.8 DIABETES MELLITUS OF OTHER TYPE WITH COMPLICATION, UNSPECIFIED WHETHER LONG TERM INSULIN USE: ICD-10-CM

## 2019-08-28 DIAGNOSIS — F43.9 SITUATIONAL STRESS: ICD-10-CM

## 2019-08-28 DIAGNOSIS — Z12.31 ENCOUNTER FOR SCREENING MAMMOGRAM FOR MALIGNANT NEOPLASM OF BREAST: ICD-10-CM

## 2019-08-28 DIAGNOSIS — I10 ESSENTIAL HYPERTENSION: ICD-10-CM

## 2019-08-28 DIAGNOSIS — E11.59 TYPE 2 DIABETES MELLITUS WITH OTHER CIRCULATORY COMPLICATION, WITHOUT LONG-TERM CURRENT USE OF INSULIN (HCC): ICD-10-CM

## 2019-08-28 DIAGNOSIS — I50.32 CHRONIC DIASTOLIC HEART FAILURE (HCC): ICD-10-CM

## 2019-08-28 DIAGNOSIS — E11.59 TYPE 2 DIABETES MELLITUS WITH OTHER CIRCULATORY COMPLICATION, WITH LONG-TERM CURRENT USE OF INSULIN (HCC): Primary | ICD-10-CM

## 2019-08-28 DIAGNOSIS — N39.46 MIXED STRESS AND URGE URINARY INCONTINENCE: ICD-10-CM

## 2019-08-28 DIAGNOSIS — Z79.4 TYPE 2 DIABETES MELLITUS WITH OTHER CIRCULATORY COMPLICATION, WITH LONG-TERM CURRENT USE OF INSULIN (HCC): Primary | ICD-10-CM

## 2019-08-28 LAB
CREAT UR-MCNC: 29.8 MG/DL
MICROALBUMIN UR-MCNC: 9.2 MG/L (ref 0–20)
MICROALBUMIN/CREAT 24H UR: 31 MG/G CREATININE (ref 0–30)
SL AMB POCT HEMOGLOBIN AIC: 7.4 (ref ?–6.5)

## 2019-08-28 PROCEDURE — 3060F POS MICROALBUMINURIA REV: CPT | Performed by: NURSE PRACTITIONER

## 2019-08-28 PROCEDURE — 3079F DIAST BP 80-89 MM HG: CPT | Performed by: NURSE PRACTITIONER

## 2019-08-28 PROCEDURE — 83036 HEMOGLOBIN GLYCOSYLATED A1C: CPT | Performed by: NURSE PRACTITIONER

## 2019-08-28 PROCEDURE — 82570 ASSAY OF URINE CREATININE: CPT | Performed by: NURSE PRACTITIONER

## 2019-08-28 PROCEDURE — 3008F BODY MASS INDEX DOCD: CPT | Performed by: NURSE PRACTITIONER

## 2019-08-28 PROCEDURE — 3045F PR MOST RECENT HEMOGLOBIN A1C LEVEL 7.0-9.0%: CPT | Performed by: NURSE PRACTITIONER

## 2019-08-28 PROCEDURE — 36415 COLL VENOUS BLD VENIPUNCTURE: CPT | Performed by: NURSE PRACTITIONER

## 2019-08-28 PROCEDURE — 3075F SYST BP GE 130 - 139MM HG: CPT | Performed by: NURSE PRACTITIONER

## 2019-08-28 PROCEDURE — 3725F SCREEN DEPRESSION PERFORMED: CPT | Performed by: NURSE PRACTITIONER

## 2019-08-28 PROCEDURE — 82043 UR ALBUMIN QUANTITATIVE: CPT | Performed by: NURSE PRACTITIONER

## 2019-08-28 PROCEDURE — 99214 OFFICE O/P EST MOD 30 MIN: CPT | Performed by: NURSE PRACTITIONER

## 2019-08-28 RX ORDER — PEN NEEDLE, DIABETIC 31 GX5/16"
NEEDLE, DISPOSABLE MISCELLANEOUS 4 TIMES DAILY
Qty: 200 EACH | Refills: 5 | Status: SHIPPED | OUTPATIENT
Start: 2019-08-28

## 2019-08-28 RX ORDER — LANCETS
EACH MISCELLANEOUS
Qty: 100 EACH | Refills: 5 | Status: SHIPPED | OUTPATIENT
Start: 2019-08-28 | End: 2019-11-08

## 2019-08-28 NOTE — PROGRESS NOTES
Assessment/Plan:           Problem List Items Addressed This Visit        Endocrine    Diabetes mellitus with circulatory complication ([facility] [address] ) - Primary     Lab Results   Component Value Date    HGBA1C 7 4 (A) 08/28/2019     Diabetes much better controlled  Cont monitoring diet     No results for input(s): POCGLU in the last 72 hours  Blood Sugar Average: Last 72 hrs:           Relevant Medications    B-D ULTRAFINE III SHORT PEN 31G X 8 MM MISC    ACCU-CHEK SOFTCLIX LANCETS lancets    glucose blood (ACCU-CHEK MONICA PLUS) test strip    Other Relevant Orders    VAS carotid complete study    POCT hemoglobin A1c (Completed)    Microalbumin / creatinine urine ratio (Completed)       Cardiovascular and Mediastinum    Chronic diastolic heart failure (HCC)     Wt Readings from Last 3 Encounters:   08/28/19 99 2 kg (218 lb 9 6 oz)   04/16/19 94 9 kg (209 lb 3 2 oz)   02/18/19 92 2 kg (203 lb 3 2 oz)     Stable  Cont current meds  Monitor weight             Relevant Orders    VAS carotid complete study    Essential hypertension     Stable  Cont current meds    Cont f/u with cardiology         Relevant Orders    VAS carotid complete study       Other    Dizziness     If not related to cardiac meds, will get mri brain with contrast  To r/o any pathology         Relevant Orders    VAS carotid complete study    Dyslipidemia     Cont current meds             Other Visit Diagnoses     Encounter for screening mammogram for malignant neoplasm of breast        Relevant Orders    Mammo screening bilateral w cad    Mixed stress and urge urinary incontinence        Relevant Orders    Ambulatory referral to Urogynecology    Diabetes mellitus of other type with complication, unspecified whether long term insulin use ([facility] [address] )        Situational stress        Relevant Orders    Ambulatory referral to Psychiatry            Subjective:      Patient ID: Shalom Henriquez is a 64 y o  female      Here for f/u to chronic medical conditions  Diabetes, htn, hyperlipid  Daily getting dizzy  bp 130/80  fbs   Gets a big wave of dizziness  No headache  Occurs after she takes her medications and goes away by 5pm  Due for pap, and mammogram  Having bladder control issues, leaking urine  Stress and urge incontinence  No chest pain or sob        The following portions of the patient's history were reviewed and updated as appropriate: allergies, current medications, past family history, past medical history, past social history, past surgical history and problem list     Review of Systems   Constitutional: Negative for fatigue and fever  HENT: Negative for congestion, postnasal drip and rhinorrhea  Eyes: Negative for photophobia and visual disturbance  Respiratory: Negative for cough, shortness of breath and wheezing  Cardiovascular: Negative for chest pain and palpitations  Gastrointestinal: Negative for constipation, diarrhea, nausea and vomiting  Genitourinary: Negative for dysuria and frequency  Musculoskeletal: Negative for arthralgias and myalgias  Skin: Negative for rash  Neurological: Positive for dizziness and light-headedness  Negative for syncope, facial asymmetry, weakness, numbness and headaches  Hematological: Negative for adenopathy  Psychiatric/Behavioral: Negative for dysphoric mood and sleep disturbance  The patient is not nervous/anxious  Objective:      /88 (BP Location: Left arm, Patient Position: Sitting, Cuff Size: Standard)   Pulse 59   Resp 16   Ht 5' 3" (1 6 m)   Wt 99 2 kg (218 lb 9 6 oz)   SpO2 97%   BMI 38 72 kg/m²   Family History   Problem Relation Age of Onset    Hypertension Mother     Coronary artery disease Father     Diabetes Father     Hypertension Father      No past medical history on file    Social History     Socioeconomic History    Marital status:      Spouse name: Not on file    Number of children: Not on file    Years of education: Not on file    Highest education level: Not on file   Occupational History    Not on file   Social Needs    Financial resource strain: Not on file    Food insecurity:     Worry: Not on file     Inability: Not on file    Transportation needs:     Medical: Not on file     Non-medical: Not on file   Tobacco Use    Smoking status: Current Every Day Smoker    Smokeless tobacco: Current User   Substance and Sexual Activity    Alcohol use: Yes     Comment: Rarely consumes     Drug use: Not on file    Sexual activity: Not on file   Lifestyle    Physical activity:     Days per week: Not on file     Minutes per session: Not on file    Stress: Not on file   Relationships    Social connections:     Talks on phone: Not on file     Gets together: Not on file     Attends [employer] service: Not on file     Active member of club or organization: Not on file     Attends meetings of clubs or organizations: Not on file     Relationship status: Not on file    Intimate partner violence:     Fear of current or ex partner: Not on file     Emotionally abused: Not on file     Physically abused: Not on file     Forced sexual activity: Not on file   Other Topics Concern    Not on file   Social History Narrative    Not on file       Current Outpatient Medications:     ACCU-CHEK SOFTCLIX LANCETS lancets, Test twice daily e11 9, Disp: 100 each, Rfl: 5    acetaminophen (TYLENOL) 500 mg tablet, Take 500 mg by mouth every 6 (six) hours as needed, Disp: , Rfl:     amiodarone 200 mg tablet, Take 200 mg by mouth daily, Disp: , Rfl:     apixaban (ELIQUIS) 5 mg, Take 5 mg by mouth, Disp: , Rfl:     atorvastatin (LIPITOR) 80 mg tablet, Take 80 mg by mouth, Disp: , Rfl:     B-D ULTRAFINE III SHORT PEN 31G X 8 MM MISC, , Disp: , Rfl: 3    furosemide (LASIX) 40 mg tablet, Take 40 mg by mouth, Disp: , Rfl:     glucose blood (ACCU-CHEK MONICA PLUS) test strip, Use with Accu-chek monica plus meter  Test 2XD   E11 65, Disp: , Rfl:     insulin glargine (BASAGLAR KWIKPEN) 100 units/mL injection pen, Inject 25 Units under the skin daily, Disp: 5 pen, Rfl: 3    insulin lispro (ADMELOG SOLOSTAR) 100 units/mL injection pen, Inject 5 Units under the skin 3 (three) times a day with meals, Disp: 5 pen, Rfl: 3    insulin lispro (HumaLOG) 100 units/mL injection, Inject 5 Units under the skin, Disp: , Rfl:     Lancet Devices (ACCU-CHEK SOFTCLIX) lancets, To be used B  I D, Disp: 1 each, Rfl: 5    lisinopril (ZESTRIL) 20 mg tablet, Take 20 mg by mouth, Disp: , Rfl:     meloxicam (MOBIC) 15 mg tablet, Take 15 mg by mouth daily, Disp: , Rfl:     metoprolol tartrate (LOPRESSOR) 50 mg tablet, Take 75 mg by mouth, Disp: , Rfl:     mometasone (ELOCON) 0 1 % cream, Apply daily to the feet as needed for psoriasis, Disp: 45 g, Rfl: 1    nystatin (MYCOSTATIN) cream, Apply twice a day as needed to the groin, Disp: 30 g, Rfl: 3    nystatin (MYCOSTATIN) powder, Apply topically 3 (three) times a day, Disp: 45 g, Rfl: 2    tobramycin (TOBREX) 0 3 % SOLN, Administer 1 drop into the left eye every 4 (four) hours while awake, Disp: 5 mL, Rfl: 0    amLODIPine (NORVASC) 5 mg tablet, Take 10 mg by mouth, Disp: , Rfl:     Insulin Syringe-Needle U-100 31G X 5/16" 0 3 ML MISC, by Other route as needed (daily as needed to test blood sugar) for up to 90 days, Disp: 180 each, Rfl: 3  No Known Allergies       Physical Exam   Constitutional: She is oriented to person, place, and time  She appears well-developed and well-nourished  HENT:   Head: Normocephalic and atraumatic  Right Ear: External ear normal    Left Ear: External ear normal    Nose: Nose normal    Mouth/Throat: Oropharynx is clear and moist    Eyes: Pupils are equal, round, and reactive to light  Conjunctivae and EOM are normal    Neck: Normal range of motion  Neck supple  Cardiovascular: Normal rate, regular rhythm and normal heart sounds  Pulmonary/Chest: Effort normal and breath sounds normal    Abdominal: Soft   Bowel sounds are normal  Musculoskeletal: Normal range of motion  Neurological: She is alert and oriented to person, place, and time  Skin: Skin is warm and dry  Capillary refill takes less than 2 seconds  Psychiatric: She has a normal mood and affect  Her behavior is normal  Judgment and thought content normal    Nursing note and vitals reviewed

## 2019-08-29 NOTE — ASSESSMENT & PLAN NOTE
Wt Readings from Last 3 Encounters:   08/28/19 99 2 kg (218 lb 9 6 oz)   04/16/19 94 9 kg (209 lb 3 2 oz)   02/18/19 92 2 kg (203 lb 3 2 oz)     Stable  Cont current meds  Monitor weight

## 2019-08-29 NOTE — ASSESSMENT & PLAN NOTE
Lab Results   Component Value Date    HGBA1C 7 4 (A) 08/28/2019     Diabetes much better controlled  Cont monitoring diet     No results for input(s): POCGLU in the last 72 hours      Blood Sugar Average: Last 72 hrs:

## 2019-09-05 ENCOUNTER — TELEPHONE (OUTPATIENT)
Dept: FAMILY MEDICINE CLINIC | Facility: CLINIC | Age: 59
End: 2019-09-05

## 2019-09-05 NOTE — TELEPHONE ENCOUNTER
Patient called and stated that she called the Urogynecology office of Dr Aries Mayers and they do not accept her insurance, she also called her insurance and they said the closest one to her would be [facility] and since she does not drive that is to far  She wanted to know if you would take care of everything, or if she should go to Urology or just gynecology   Please advise         Patient aware you are out of office till Monday

## 2019-09-13 ENCOUNTER — TELEPHONE (OUTPATIENT)
Dept: FAMILY MEDICINE CLINIC | Facility: CLINIC | Age: 59
End: 2019-09-13

## 2019-10-16 ENCOUNTER — OFFICE VISIT (OUTPATIENT)
Dept: OBGYN CLINIC | Facility: CLINIC | Age: 59
End: 2019-10-16
Payer: COMMERCIAL

## 2019-10-16 VITALS
HEIGHT: 61 IN | SYSTOLIC BLOOD PRESSURE: 120 MMHG | BODY MASS INDEX: 32.47 KG/M2 | WEIGHT: 172 LBS | DIASTOLIC BLOOD PRESSURE: 80 MMHG

## 2019-10-16 DIAGNOSIS — Z12.9 CANCER SCREENING: ICD-10-CM

## 2019-10-16 DIAGNOSIS — R31.9 HEMATURIA, UNSPECIFIED TYPE: ICD-10-CM

## 2019-10-16 DIAGNOSIS — N39.41 URGE INCONTINENCE OF URINE: ICD-10-CM

## 2019-10-16 DIAGNOSIS — R39.15 URINARY URGENCY: ICD-10-CM

## 2019-10-16 DIAGNOSIS — R35.0 URINARY FREQUENCY: Primary | ICD-10-CM

## 2019-10-16 LAB
SL AMB  POCT GLUCOSE, UA: NEGATIVE
SL AMB LEUKOCYTE ESTERASE,UA: ABNORMAL
SL AMB POCT BILIRUBIN,UA: NEGATIVE
SL AMB POCT BLOOD,UA: ABNORMAL
SL AMB POCT CLARITY,UA: CLEAR
SL AMB POCT COLOR,UA: YELLOW
SL AMB POCT KETONES,UA: NEGATIVE
SL AMB POCT NITRITE,UA: NEGATIVE
SL AMB POCT PH,UA: 5
SL AMB POCT SPECIFIC GRAVITY,UA: 1000
SL AMB POCT URINE PROTEIN: NEGATIVE
SL AMB POCT UROBILINOGEN: NORMAL

## 2019-10-16 PROCEDURE — 99386 PREV VISIT NEW AGE 40-64: CPT | Performed by: OBSTETRICS & GYNECOLOGY

## 2019-10-16 PROCEDURE — 81002 URINALYSIS NONAUTO W/O SCOPE: CPT | Performed by: OBSTETRICS & GYNECOLOGY

## 2019-10-16 RX ORDER — AMLODIPINE BESYLATE 5 MG/1
5 TABLET ORAL DAILY
Refills: 5 | COMMUNITY
Start: 2019-09-02

## 2019-10-16 NOTE — PROGRESS NOTES
The patient is here for a yearly  A mammogram order was put in on 8/28/19  No bleeding or cramping  The patient has urinary urgency, leakage and frequency  No urinary burning or pain  The patient has had the urinary issues for years but it is getting worse  The patient has been seeing Dr Valerio Bryant at [facility]  No vaginal issues  No breast concerns

## 2019-10-16 NOTE — PROGRESS NOTES
Assessment/Plan:    Normal breast and GYN exam  Cervix and uterus absent  Vaginal atrophy  Colonoscopy discussed  Healthy diet and exercise discussed  RX for mammogram  History of renal stones without flank pain  UA done today shows trace hematuria  Plan: check UA CNS first before treatment  Subjective:      Patient ID: Kamille Logan is a 64 y o   female s/p TVH ()  She is a former patient but hasn't been seen in this office since [address]  She has a history of AFIB (hx of cardio ablation and several cardioversions), CHF, Diabetes, kidney stones (no flank pain)  She was hospitalized for Necrotizing Fascitis this past year (Oct 2018) and needed excessive thigh/hip/buttock/flank skin excision  UA done today shows trace hematuria  Review of Systems   Constitutional: Negative  Negative for fatigue, fever and unexpected weight change  HENT: Negative  Eyes: Negative  Respiratory: Negative  Negative for chest tightness, shortness of breath, wheezing and stridor  Cardiovascular: Negative  Negative for chest pain, palpitations and leg swelling  Gastrointestinal: Negative  Negative for abdominal pain, blood in stool, diarrhea, nausea, rectal pain and vomiting  Endocrine: Negative  Genitourinary: Negative for dysuria, frequency, vaginal bleeding, vaginal discharge and vaginal pain  Musculoskeletal: Negative  Skin: Negative  Allergic/Immunologic: Negative  Neurological: Negative  Hematological: Negative  Psychiatric/Behavioral: Negative  All other systems reviewed and are negative  Objective:      /80 (BP Location: Left arm, Patient Position: Sitting, Cuff Size: Standard)   Ht 5' 1 42" (1 56 m)   Wt 78 kg (172 lb)   LMP  (LMP Unknown)   BMI 32 06 kg/m²          Physical Exam   Constitutional: She appears well-developed and well-nourished  Cardiovascular: Normal rate, regular rhythm and normal heart sounds     Pulmonary/Chest: Effort normal  No stridor  No respiratory distress  She has no wheezes  She has no rales  She exhibits no tenderness  Right breast exhibits no inverted nipple, no mass, no nipple discharge, no skin change and no tenderness  Left breast exhibits no inverted nipple, no mass, no nipple discharge, no skin change and no tenderness  No breast swelling, tenderness, discharge or bleeding  Breasts are symmetrical    Abdominal: Soft  Bowel sounds are normal  She exhibits no distension and no mass  There is no tenderness  There is no rebound and no guarding  No hernia  Hernia confirmed negative in the right inguinal area and confirmed negative in the left inguinal area  Genitourinary: Vagina normal  Rectal exam shows no external hemorrhoid, no internal hemorrhoid, no fissure, no mass, no tenderness and anal tone normal  No breast swelling, tenderness, discharge or bleeding  No labial fusion  There is no rash, tenderness, lesion or injury on the right labia  There is no rash, tenderness, lesion or injury on the left labia  Right adnexum displays no mass, no tenderness and no fullness  Left adnexum displays no mass, no tenderness and no fullness  No erythema, tenderness or bleeding in the vagina  No foreign body in the vagina  No signs of injury around the vagina  No vaginal discharge found  Genitourinary Comments: Urethral meatus normal    Cervix and uterus absent  Vaginal atrophy     Lymphadenopathy: No inguinal adenopathy noted on the right or left side  Psychiatric: She has a normal mood and affect   Her behavior is normal  Judgment and thought content normal

## 2019-10-20 LAB — BACTERIA UR CULT: ABNORMAL

## 2019-10-22 DIAGNOSIS — B96.20 E-COLI UTI: Primary | ICD-10-CM

## 2019-10-22 DIAGNOSIS — N39.0 E-COLI UTI: Primary | ICD-10-CM

## 2019-10-22 RX ORDER — CIPROFLOXACIN 250 MG/1
250 TABLET, FILM COATED ORAL EVERY 12 HOURS SCHEDULED
Qty: 14 TABLET | Refills: 0 | Status: SHIPPED | OUTPATIENT
Start: 2019-10-22 | End: 2019-10-29

## 2019-11-04 ENCOUNTER — TELEPHONE (OUTPATIENT)
Dept: FAMILY MEDICINE CLINIC | Facility: CLINIC | Age: 59
End: 2019-11-04

## 2019-11-04 DIAGNOSIS — E11.59 TYPE 2 DIABETES MELLITUS WITH OTHER CIRCULATORY COMPLICATION, WITH LONG-TERM CURRENT USE OF INSULIN (HCC): Primary | ICD-10-CM

## 2019-11-04 DIAGNOSIS — Z79.4 TYPE 2 DIABETES MELLITUS WITH OTHER CIRCULATORY COMPLICATION, WITH LONG-TERM CURRENT USE OF INSULIN (HCC): Primary | ICD-10-CM

## 2019-11-04 NOTE — TELEPHONE ENCOUNTER
Patient called and stated her Glucometer broke and needs a new Script sent to the Pharmacy for a new ACCU Check [facility] Glucometer           Pharmacy is [facility] on [address]

## 2019-11-06 ENCOUNTER — TELEPHONE (OUTPATIENT)
Dept: FAMILY MEDICINE CLINIC | Facility: CLINIC | Age: 59
End: 2019-11-06

## 2019-11-06 NOTE — TELEPHONE ENCOUNTER
Alta Spear will reorder unit that is covered by insurance it is the Accu check guide brand per the pharmacist at [facility]

## 2019-11-07 ENCOUNTER — TELEPHONE (OUTPATIENT)
Dept: FAMILY MEDICINE CLINIC | Facility: CLINIC | Age: 59
End: 2019-11-07

## 2019-11-07 NOTE — TELEPHONE ENCOUNTER
Patient called back and stated that pharmacy has not received the new script for the Accu Check Guide and she will need the lancets and strips as well due to her old machine not being interchangable with this one  Please advise as I don't see the New script in the system yet  Please advise      [facility] on [address]

## 2019-11-08 ENCOUNTER — TELEPHONE (OUTPATIENT)
Dept: FAMILY MEDICINE CLINIC | Facility: CLINIC | Age: 59
End: 2019-11-08

## 2019-11-08 DIAGNOSIS — Z79.4 TYPE 2 DIABETES MELLITUS WITH OTHER CIRCULATORY COMPLICATION, WITH LONG-TERM CURRENT USE OF INSULIN (HCC): Primary | ICD-10-CM

## 2019-11-08 DIAGNOSIS — E11.59 TYPE 2 DIABETES MELLITUS WITH OTHER CIRCULATORY COMPLICATION, WITH LONG-TERM CURRENT USE OF INSULIN (HCC): Primary | ICD-10-CM

## 2019-11-08 RX ORDER — BLOOD-GLUCOSE METER
EACH MISCELLANEOUS
Qty: 1 KIT | Refills: 0 | Status: SHIPPED | OUTPATIENT
Start: 2019-11-08

## 2019-11-08 NOTE — TELEPHONE ENCOUNTER
Lalita Weems, I know we sent over the test strips and lancets but she also needs a Script for a new Glucometer Accucheck Guide   Please advise

## 2020-01-13 ENCOUNTER — TELEPHONE (OUTPATIENT)
Dept: FAMILY MEDICINE CLINIC | Facility: CLINIC | Age: 60
End: 2020-01-13

## 2020-01-13 NOTE — TELEPHONE ENCOUNTER
Patient called and needs to schedule a Hospital follow up she is was d/c from [facility] on 1[facility]20 and she wanted to know if Phil Mcfadden can squeeze her in on Wednesday as she is stating that they want her to be seen by then, she does have apt with the surgeon on Wednesday at 115pm  Please advise where I may put patient

## 2020-01-13 NOTE — TELEPHONE ENCOUNTER
Left msg for pt that Candice Isidro can see her Weds @ 4:30 and to call office to confirm this time works for her

## 2020-01-15 ENCOUNTER — OFFICE VISIT (OUTPATIENT)
Dept: FAMILY MEDICINE CLINIC | Facility: CLINIC | Age: 60
End: 2020-01-15
Payer: COMMERCIAL

## 2020-01-15 VITALS
SYSTOLIC BLOOD PRESSURE: 112 MMHG | DIASTOLIC BLOOD PRESSURE: 72 MMHG | TEMPERATURE: 98.7 F | OXYGEN SATURATION: 97 % | HEART RATE: 76 BPM

## 2020-01-15 DIAGNOSIS — E78.5 DYSLIPIDEMIA: ICD-10-CM

## 2020-01-15 DIAGNOSIS — R20.8 VIBRATION SENSORY LOSS: ICD-10-CM

## 2020-01-15 DIAGNOSIS — E11.59 TYPE 2 DIABETES MELLITUS WITH OTHER CIRCULATORY COMPLICATION, WITH LONG-TERM CURRENT USE OF INSULIN (HCC): Primary | ICD-10-CM

## 2020-01-15 DIAGNOSIS — E66.01 CLASS 2 SEVERE OBESITY DUE TO EXCESS CALORIES WITH SERIOUS COMORBIDITY AND BODY MASS INDEX (BMI) OF 36.0 TO 36.9 IN ADULT (HCC): ICD-10-CM

## 2020-01-15 DIAGNOSIS — I10 ESSENTIAL HYPERTENSION: ICD-10-CM

## 2020-01-15 DIAGNOSIS — R41.3 MEMORY LOSS: ICD-10-CM

## 2020-01-15 DIAGNOSIS — R25.1 TREMOR: ICD-10-CM

## 2020-01-15 DIAGNOSIS — Z79.4 TYPE 2 DIABETES MELLITUS WITH OTHER CIRCULATORY COMPLICATION, WITH LONG-TERM CURRENT USE OF INSULIN (HCC): Primary | ICD-10-CM

## 2020-01-15 DIAGNOSIS — R60.0 EDEMA, LOWER EXTREMITY: ICD-10-CM

## 2020-01-15 PROCEDURE — 99214 OFFICE O/P EST MOD 30 MIN: CPT | Performed by: NURSE PRACTITIONER

## 2020-01-15 PROCEDURE — 3725F SCREEN DEPRESSION PERFORMED: CPT | Performed by: NURSE PRACTITIONER

## 2020-01-15 NOTE — PROGRESS NOTES
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    Relevant Orders    CBC and differential    VAS lower limb arterial duplex, complete bilateral    Iron Panel (Includes Ferritin, Iron Sat%, Iron, and TIBC)    Vitamin B12       Cardiovascular and Mediastinum    Essential hypertension    Relevant Orders    CBC and differential    VAS lower limb arterial duplex, complete bilateral    Iron Panel (Includes Ferritin, Iron Sat%, Iron, and TIBC)    Vitamin B12       Other    Class 2 severe obesity due to excess calories with serious comorbidity and body mass index (BMI) of 36 0 to 36 9 in [facility])    Dyslipidemia    Relevant Orders    CBC and differential    VAS lower limb arterial duplex, complete bilateral    Iron Panel (Includes Ferritin, Iron Sat%, Iron, and TIBC)    Vitamin B12    Memory loss    Relevant Orders    CBC and differential    VAS lower limb arterial duplex, complete bilateral    Ambulatory referral to Neurology    Iron Panel (Includes Ferritin, Iron Sat%, Iron, and TIBC)    Vitamin B12    Vibration sensory loss    Relevant Orders    CBC and differential    VAS lower limb arterial duplex, complete bilateral    Ambulatory referral to Neurology    Iron Panel (Includes Ferritin, Iron Sat%, Iron, and TIBC)    Vitamin B12      Other Visit Diagnoses     Edema, lower extremity        Relevant Orders    VAS lower limb arterial duplex, complete bilateral    Iron Panel (Includes Ferritin, Iron Sat%, Iron, and TIBC)    Vitamin B12    Tremor        Relevant Orders    Ambulatory referral to Neurology    Iron Panel (Includes Ferritin, Iron Sat%, Iron, and TIBC)    Vitamin B12            Subjective:      Patient ID: Jarocho Vogel is a 64 y o  female      Here for rehab and hospital follow up  On 12/12  Admitted to [facility] for necrotizing fasciitis of the left lower leg, SIRS, ventilated, and staph infection  Had multiple wound debridements  D/c to rehab on 1/3 to 1/13/20 at [facility] [place]  Had post op visit with surgeon on 1/15/20 and is sending her to plastics for possible skin graft  Currently per documentation her wound on left leg is 9" x3"  She is feeling fatigue and strength is not back to normal   is concerned because she has a tremor that is new and also her left arm is weaker  She is having memory issues, short term, this is new since most recent illness as well               The following portions of the patient's history were reviewed and updated as appropriate: allergies, current medications, past family history, past medical history, past social history, past surgical history and problem list     Review of Systems   Constitutional: Positive for fatigue  Negative for fever  HENT: Negative for congestion and postnasal drip  Eyes: Negative for photophobia and visual disturbance  Respiratory: Negative for cough and shortness of breath  Cardiovascular: Negative for chest pain and palpitations  Gastrointestinal: Negative for constipation and diarrhea  Musculoskeletal: Negative for arthralgias, joint swelling and myalgias  Skin: Positive for wound  Neurological: Positive for tremors and weakness  Negative for dizziness, light-headedness and headaches  Hematological: Negative for adenopathy  Psychiatric/Behavioral: Positive for dysphoric mood  Negative for sleep disturbance  The patient is not nervous/anxious            Objective:      /72 (BP Location: Right arm, Cuff Size: Standard)   Pulse 76   Temp 98 7 °F (37 1 °C) (Tympanic)   LMP  (LMP Unknown)   SpO2 97%     Family History   Problem Relation Age of Onset    Hypertension Mother     Coronary artery disease Father     Diabetes Father     Hypertension Father      Past Medical History:   Diagnosis Date    [facility]rial fibrillation ([facility]     [facility]congestive [facility] failure) [facility]     Diabetes ([facility]     Necrotizing fasciitis ([facility] ) Social History     Socioeconomic History    Marital status:      Spouse name: Not on file    Number of children: Not on file    Years of education: Not on file    Highest education level: Not on file   Occupational History    Not on file   Social Needs    Financial resource strain: Not on file    Food insecurity:     Worry: Not on file     Inability: Not on file    Transportation needs:     Medical: Not on file     Non-medical: Not on file   Tobacco Use    Smoking status: Former Smoker     Types: Cigarettes     Last attempt to quit: 2014     Years since quittin 0    Smokeless tobacco: Never Used   Substance and Sexual Activity    Alcohol use: Not Currently     Comment: Rarely consumes     Drug use: Not on file    Sexual activity: Yes     Birth control/protection: Surgical   Lifestyle    Physical activity:     Days per week: Not on file     Minutes per session: Not on file    Stress: Not on file   Relationships    Social connections:     Talks on phone: Not on file     Gets together: Not on file     Attends Scientology service: Not on file     Active member of club or organization: Not on file     Attends meetings of clubs or organizations: Not on file     Relationship status: Not on file    Intimate partner violence:     Fear of current or ex partner: Not on file     Emotionally abused: Not on file     Physically abused: Not on file     Forced sexual activity: Not on file   Other Topics Concern    Not on file   Social History Narrative    Not on file       Current Outpatient Medications:     acetaminophen (TYLENOL) 500 mg tablet, Take 500 mg by mouth every 6 (six) hours as needed, Disp: , Rfl:     amiodarone 200 mg tablet, Take 200 mg by mouth daily, Disp: , Rfl:     amLODIPine (NORVASC) 5 mg tablet, Take 5 mg by mouth daily, Disp: , Rfl: 5    apixaban (ELIQUIS) 5 mg, Take 5 mg by mouth, Disp: , Rfl:     atorvastatin (LIPITOR) 80 mg tablet, Take 80 mg by mouth, Disp: , Rfl:   B-D ULTRAFINE III SHORT PEN 31G X 8 MM MISC, Inject under the skin 4 (four) times a day, Disp: 200 each, Rfl: 5    Blood Glucose Monitoring Suppl (ACCU-CHEK GUIDE ME) w/Device KIT, Test once or twice daily, e11 9, Disp: 1 kit, Rfl: 0    furosemide (LASIX) 40 mg tablet, Take 40 mg by mouth, Disp: , Rfl:     glucose blood (ACCU-CHEK MONICA PLUS) test strip, 1 each by Other route 2 (two) times a day Use as instructed, Disp: 100 each, Rfl: 5    insulin glargine (BASAGLAR KWIKPEN) 100 units/mL injection pen, Inject 25 Units under the skin daily, Disp: 5 pen, Rfl: 3    insulin lispro (ADMELOG SOLOSTAR) 100 units/mL injection pen, Inject 5 Units under the skin 3 (three) times a day with meals, Disp: 5 pen, Rfl: 3    insulin lispro (HumaLOG) 100 units/mL injection, Inject 5 Units under the skin, Disp: , Rfl:     Lancet Devices (ACCU-CHEK SOFTCLIX) lancets, To be used B  I D, Disp: 1 each, Rfl: 5    Lancets (ACCU-CHEK SOFT TOUCH) lancets, Test once or twice daily, e11 9 accu check guide, Disp: 100 each, Rfl: 5    lisinopril (ZESTRIL) 20 mg tablet, Take 20 mg by mouth, Disp: , Rfl:     metoprolol tartrate (LOPRESSOR) 50 mg tablet, Take 75 mg by mouth, Disp: , Rfl:   No Known Allergies     Physical Exam   Constitutional: She is oriented to person, place, and time  She appears well-developed and well-nourished  HENT:   Head: Normocephalic  Right Ear: External ear normal    Left Ear: External ear normal    Nose: Nose normal    Mouth/Throat: Oropharynx is clear and moist    Eyes: Pupils are equal, round, and reactive to light  Conjunctivae and EOM are normal    Neck: Normal range of motion  Neck supple  Cardiovascular: Normal rate, regular rhythm and normal heart sounds  Pulses are weak pulses  Pulses:       Dorsalis pedis pulses are 1+ on the right side, and 1+ on the left side  Posterior tibial pulses are 1+ on the right side, and 1+ on the left side     Pulmonary/Chest: Effort normal and breath sounds normal    Abdominal: Soft  Bowel sounds are normal    Musculoskeletal: Normal range of motion  Feet:    Roller walker for ambulation  Left arm 3/5 strength  Right arm 5/5 strength     Feet:   Right Foot:   Skin Integrity: Negative for ulcer, skin breakdown, erythema, warmth, callus or dry skin  Left Foot:   Skin Integrity: Negative for ulcer, skin breakdown, erythema, warmth, callus or dry skin  Neurological: She is alert and oriented to person, place, and time  Skin: Skin is warm and dry  Capillary refill takes less than 2 seconds  Edema left lower leg  Covered wound left lower leg     Psychiatric: She has a normal mood and affect  Her behavior is normal  Judgment and thought content normal    Nursing note and vitals reviewed  Patient's shoes and socks removed  Right Foot/Ankle   Right Foot Inspection  Skin Exam: skin normal and skin intact no dry skin, no warmth, no callus, no erythema, no maceration, no abnormal color, no pre-ulcer, no ulcer and no callus                          Toe Exam: ROM and strength within normal limits  Sensory   Vibration: intact  Proprioception: intact   Monofilament testing: diminished  Vascular  Capillary refills: < 3 seconds  The right DP pulse is 1+  The right PT pulse is 1+  Left Foot/Ankle  Left Foot Inspection  Skin Exam: skin normal and skin intactno dry skin, no warmth, no erythema, no maceration, normal color, no pre-ulcer, no ulcer and no callus                         Toe Exam: ROM and strength within normal limits                   Sensory   Vibration: intact  Proprioception: intact  Monofilament: diminished  Vascular  Capillary refills: < 3 seconds  The left DP pulse is 1+  The left PT pulse is 1+  Assign Risk Category:  No deformity present; Loss of protective sensation; Weak pulses       Risk: 2        BMI Counseling: There is no height or weight on file to calculate BMI   The BMI is above normal  Nutrition recommendations include reducing portion sizes, decreasing overall calorie intake, 3-5 servings of fruits/vegetables daily, reducing fast food intake, consuming healthier snacks, decreasing soda and/or juice intake, moderation in carbohydrate intake, increasing intake of lean protein, reducing intake of saturated fat and trans fat and reducing intake of cholesterol  Exercise recommendations include moderate aerobic physical activity for 150 minutes/week, exercising 3-5 times per week and strength training exercises

## 2020-01-27 ENCOUNTER — TELEPHONE (OUTPATIENT)
Dept: FAMILY MEDICINE CLINIC | Facility: CLINIC | Age: 60
End: 2020-01-27

## 2020-01-27 NOTE — TELEPHONE ENCOUNTER
PT CALLED AND CANT COME TO HER APPT WITH YOU BUT PT ASKED IF SHE CAN COME IN WITH HER DTR THIS Friday AT 10 JUS A QUICK LOOK AT HER FROM HER FOLLOW UP FROM SURGERY   PLS ADVISE IF OK

## 2020-01-28 ENCOUNTER — TELEPHONE (OUTPATIENT)
Dept: FAMILY MEDICINE CLINIC | Facility: CLINIC | Age: 60
End: 2020-01-28

## 2020-01-28 NOTE — TELEPHONE ENCOUNTER
Patient called and stated she just took her blood sugar and it was 327, it is usually at 130  Spoke to Niyah Lui she suggested ER due to patient recently having sepisis, also she is shaky and out of sorts  Advised patient and she understood

## 2020-01-31 ENCOUNTER — OFFICE VISIT (OUTPATIENT)
Dept: FAMILY MEDICINE CLINIC | Facility: CLINIC | Age: 60
End: 2020-01-31
Payer: COMMERCIAL

## 2020-01-31 VITALS
BODY MASS INDEX: 40.33 KG/M2 | DIASTOLIC BLOOD PRESSURE: 88 MMHG | RESPIRATION RATE: 16 BRPM | HEART RATE: 85 BPM | WEIGHT: 213.6 LBS | TEMPERATURE: 97.9 F | SYSTOLIC BLOOD PRESSURE: 130 MMHG | HEIGHT: 61 IN | OXYGEN SATURATION: 97 %

## 2020-01-31 DIAGNOSIS — E66.01 CLASS 2 SEVERE OBESITY DUE TO EXCESS CALORIES WITH SERIOUS COMORBIDITY AND BODY MASS INDEX (BMI) OF 36.0 TO 36.9 IN ADULT (HCC): ICD-10-CM

## 2020-01-31 DIAGNOSIS — E11.59 TYPE 2 DIABETES MELLITUS WITH OTHER CIRCULATORY COMPLICATION, WITH LONG-TERM CURRENT USE OF INSULIN (HCC): Primary | ICD-10-CM

## 2020-01-31 DIAGNOSIS — S81.801D WOUND OF RIGHT LOWER EXTREMITY, SUBSEQUENT ENCOUNTER: ICD-10-CM

## 2020-01-31 DIAGNOSIS — I50.32 CHRONIC DIASTOLIC HEART FAILURE (HCC): ICD-10-CM

## 2020-01-31 DIAGNOSIS — I10 ESSENTIAL HYPERTENSION: ICD-10-CM

## 2020-01-31 DIAGNOSIS — Z79.4 TYPE 2 DIABETES MELLITUS WITH OTHER CIRCULATORY COMPLICATION, WITH LONG-TERM CURRENT USE OF INSULIN (HCC): Primary | ICD-10-CM

## 2020-01-31 PROCEDURE — 3079F DIAST BP 80-89 MM HG: CPT | Performed by: NURSE PRACTITIONER

## 2020-01-31 PROCEDURE — 99214 OFFICE O/P EST MOD 30 MIN: CPT | Performed by: NURSE PRACTITIONER

## 2020-01-31 PROCEDURE — 1036F TOBACCO NON-USER: CPT | Performed by: NURSE PRACTITIONER

## 2020-01-31 PROCEDURE — 3008F BODY MASS INDEX DOCD: CPT | Performed by: NURSE PRACTITIONER

## 2020-01-31 PROCEDURE — 3075F SYST BP GE 130 - 139MM HG: CPT | Performed by: NURSE PRACTITIONER

## 2020-01-31 NOTE — PROGRESS NOTES
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)     Encouraged to watch diet, unable to exercise at present          Wound of right leg     Cont follow up with surgeon and per their recommendations                 Subjective:      Patient ID: Ge Lanier is a 64 y o  female  Here for f/u to chronic medical conditions  Her for face to face encounter for hospital bed  Difficulty with ambulation due to wound and wound vac  Difficulty with breathing due to her CHF, unable to lay flat  Gait abnormality due to deconditioning and chronic diseases  Using wheelchair when out of the house for doctors appointments, walker in house to navigate    Tuesday into Wednesday had shaky, confused and we sent her to er  Was dehydrated and had gentle hydration  Had to stay overnight  D/c next day    To see surgeon this afternoon for f/u to her right leg wound   No fever chills           The following portions of the patient's history were reviewed and updated as appropriate: allergies, current medications, past family history, past medical history, past social history, past surgical history and problem list     Review of Systems   Constitutional: Negative for fatigue and fever  HENT: Negative for congestion and postnasal drip      Eyes: Negative for photophobia and visual disturbance  Respiratory: Negative for cough and shortness of breath  Cardiovascular: Negative for chest pain, palpitations and leg swelling  Gastrointestinal: Negative for constipation and diarrhea  Endocrine: Negative for polydipsia, polyphagia and polyuria  Genitourinary: Negative for dysuria and frequency  Musculoskeletal: Positive for arthralgias and myalgias  Skin: Positive for wound  Neurological: Negative for dizziness and headaches  Hematological: Negative for adenopathy  Psychiatric/Behavioral: Positive for dysphoric mood  Negative for decreased concentration  The patient is not nervous/anxious            Objective:      /88 (BP Location: Right arm, Patient Position: Sitting, Cuff Size: Standard)   Pulse 85   Temp 97 9 °F (36 6 °C) (Tympanic)   Resp 16   Ht 5' 1 42" (1 56 m)   Wt 96 9 kg (213 lb 9 6 oz)   LMP  (LMP Unknown)   SpO2 97%   BMI 39 81 kg/m²     Family History   Problem Relation Age of Onset    Hypertension Mother     Coronary artery disease Father     Diabetes Father     Hypertension Father      Past Medical History:   Diagnosis Date    Atrial fibrillation (HCC)     [facility]     Diabetes (HCC)     Necrotizing fasciitis ([facility] )      Social History     Socioeconomic History    Marital status:      Spouse name: Not on file    Number of children: Not on file    Years of education: Not on file    Highest education level: Not on file   Occupational History    Not on file   Social Needs    Financial resource strain: Not on file    Food insecurity:     Worry: Not on file     Inability: Not on file    Transportation needs:     Medical: Not on file     Non-medical: Not on file   Tobacco Use    Smoking status: Former Smoker     Types: Cigarettes     Last attempt to quit: 2014     Years since quittin 0    Smokeless tobacco: Never Used   Substance and Sexual Activity    Alcohol use: Not Currently     Comment: Rarely consumes     Drug use: Not on file    Sexual activity: Yes     Birth control/protection: Surgical   Lifestyle    Physical activity:     Days per week: Not on file     Minutes per session: Not on file    Stress: Not on file   Relationships    Social connections:     Talks on phone: Not on file     Gets together: Not on file     Attends Confucianism service: Not on file     Active member of club or organization: Not on file     Attends meetings of clubs or organizations: Not on file     Relationship status: Not on file    Intimate partner violence:     Fear of current or ex partner: Not on file     Emotionally abused: Not on file     Physically abused: Not on file     Forced sexual activity: Not on file   Other Topics Concern    Not on file   Social History Narrative    Not on file       Current Outpatient Medications:     acetaminophen (TYLENOL) 500 mg tablet, Take 500 mg by mouth every 6 (six) hours as needed, Disp: , Rfl:     amiodarone 200 mg tablet, Take 200 mg by mouth daily, Disp: , Rfl:     amLODIPine (NORVASC) 5 mg tablet, Take 5 mg by mouth daily, Disp: , Rfl: 5    apixaban (ELIQUIS) 5 mg, Take 5 mg by mouth, Disp: , Rfl:     atorvastatin (LIPITOR) 80 mg tablet, Take 80 mg by mouth, Disp: , Rfl:     B-D ULTRAFINE III SHORT PEN 31G X 8 MM MISC, Inject under the skin 4 (four) times a day, Disp: 200 each, Rfl: 5    Blood Glucose Monitoring Suppl (ACCU-CHEK GUIDE ME) w/Device KIT, Test once or twice daily, e11 9, Disp: 1 kit, Rfl: 0    furosemide (LASIX) 40 mg tablet, Take 40 mg by mouth, Disp: , Rfl:     glucose blood (ACCU-CHEK MONICA PLUS) test strip, 1 each by Other route 2 (two) times a day Use as instructed, Disp: 100 each, Rfl: 5    insulin glargine (BASAGLAR KWIKPEN) 100 units/mL injection pen, Inject 25 Units under the skin daily, Disp: 5 pen, Rfl: 3    insulin lispro (ADMELOG SOLOSTAR) 100 units/mL injection pen, Inject 5 Units under the skin 3 (three) times a day with meals, Disp: 5 pen, Rfl: 3    insulin lispro (HumaLOG) 100 units/mL injection, Inject 5 Units under the skin, Disp: , Rfl:     Lancet Devices (ACCU-CHEK SOFTCLIX) lancets, To be used B  I D, Disp: 1 each, Rfl: 5    Lancets (ACCU-CHEK SOFT TOUCH) lancets, Test once or twice daily, e11 9 accu check guide, Disp: 100 each, Rfl: 5    lisinopril (ZESTRIL) 20 mg tablet, Take 20 mg by mouth, Disp: , Rfl:     metoprolol tartrate (LOPRESSOR) 50 mg tablet, Take 75 mg by mouth, Disp: , Rfl:   No Known Allergies     Physical Exam   Constitutional: She is oriented to person, place, and time  She appears well-developed and well-nourished  HENT:   Head: Normocephalic  Right Ear: External ear normal    Left Ear: External ear normal    Nose: Nose normal    Mouth/Throat: Oropharynx is clear and moist    Eyes: Pupils are equal, round, and reactive to light  Conjunctivae and EOM are normal    Neck: Normal range of motion  Neck supple  No thyromegaly present  Cardiovascular: Normal rate, regular rhythm and normal heart sounds  Pulmonary/Chest: Effort normal and breath sounds normal    Abdominal: Soft  Bowel sounds are normal    Musculoskeletal: Normal range of motion  Neurological: She is alert and oriented to person, place, and time  Skin: Skin is warm and dry  Capillary refill takes less than 2 seconds  Psychiatric: She has a normal mood and affect  Her behavior is normal    Nursing note and vitals reviewed

## 2020-02-05 PROBLEM — S81.801A WOUND OF RIGHT LEG: Status: ACTIVE | Noted: 2020-02-05

## 2020-02-05 NOTE — ASSESSMENT & PLAN NOTE
Wt Readings from Last 3 Encounters:   01/31/20 96 9 kg (213 lb 9 6 oz)   10/16/19 78 kg (172 lb)   08/28/19 99 2 kg (218 lb 9 6 oz)     Monitor weight and cont current meds

## 2020-02-07 ENCOUNTER — TELEPHONE (OUTPATIENT)
Dept: FAMILY MEDICINE CLINIC | Facility: CLINIC | Age: 60
End: 2020-02-07

## 2020-02-07 NOTE — TELEPHONE ENCOUNTER
Spoke with Chris Wilkinson, she suggests whenever they can  Called Esmer Barragan (not Tani Barrow) to inform her of Cici's decision, she will make note to go back and draw on Monday FYI: due to patients insurance, the home/caretakers know Dr Lillian Agosto as her provider

## 2020-02-07 NOTE — TELEPHONE ENCOUNTER
Carol from [facility] called and stated that the patient was due for BMP draw on the 5th, she stated that shes not sure why it wasn't done that day but she  just tried today and was unable to get it, she wanted to know if they should go back out and try tomorrow or go back out Monday   Please advise

## 2020-02-10 ENCOUNTER — TELEPHONE (OUTPATIENT)
Dept: FAMILY MEDICINE CLINIC | Facility: CLINIC | Age: 60
End: 2020-02-10

## 2020-02-10 NOTE — TELEPHONE ENCOUNTER
Patient called stating that [address] [place] [address] faxed over paperwork on 01/24/2020 for additional information they needed for the order of the hospital bed that was faxed on 01/23/2020  We checked our fax logs and we did not receive the paperwork  I explained to the patient we did not get it and asked if it could be refaxed  She stated this has been going on too long and gave me the information that can be faxed to [address] [place] [address]  1  Last office notes when she had seen Mis Gilbert  2  Length of time bed is needed for  3  Type of bed fully or semi electric  4  Cici's NPI number  5  Cici's License number      Please advise

## 2020-02-13 NOTE — TELEPHONE ENCOUNTER
Patient called again to ask what the problem is that [facility] is not getting a response from office  Reviewing patient's chart in media, there is no fax from pharmacy requesting the info needed to process for the hospital bed  I spoke with Karol, she will work on this

## 2020-03-20 ENCOUNTER — TELEMEDICINE (OUTPATIENT)
Dept: FAMILY MEDICINE CLINIC | Facility: CLINIC | Age: 60
End: 2020-03-20
Payer: COMMERCIAL

## 2020-03-20 DIAGNOSIS — F41.9 ANXIETY AND DEPRESSION: ICD-10-CM

## 2020-03-20 DIAGNOSIS — F32.A ANXIETY AND DEPRESSION: ICD-10-CM

## 2020-03-20 DIAGNOSIS — E78.5 DYSLIPIDEMIA: ICD-10-CM

## 2020-03-20 DIAGNOSIS — E11.59 TYPE 2 DIABETES MELLITUS WITH OTHER CIRCULATORY COMPLICATION, WITH LONG-TERM CURRENT USE OF INSULIN (HCC): ICD-10-CM

## 2020-03-20 DIAGNOSIS — G47.33 MILD OBSTRUCTIVE SLEEP APNEA: ICD-10-CM

## 2020-03-20 DIAGNOSIS — E66.01 CLASS 2 SEVERE OBESITY DUE TO EXCESS CALORIES WITH SERIOUS COMORBIDITY AND BODY MASS INDEX (BMI) OF 36.0 TO 36.9 IN ADULT (HCC): ICD-10-CM

## 2020-03-20 DIAGNOSIS — Z12.11 SCREENING FOR COLON CANCER: Primary | ICD-10-CM

## 2020-03-20 DIAGNOSIS — I50.32 CHRONIC DIASTOLIC HEART FAILURE (HCC): ICD-10-CM

## 2020-03-20 DIAGNOSIS — Z79.4 TYPE 2 DIABETES MELLITUS WITH OTHER CIRCULATORY COMPLICATION, WITH LONG-TERM CURRENT USE OF INSULIN (HCC): ICD-10-CM

## 2020-03-20 DIAGNOSIS — I10 ESSENTIAL HYPERTENSION: ICD-10-CM

## 2020-03-20 PROCEDURE — G2012 BRIEF CHECK IN BY MD/QHP: HCPCS | Performed by: NURSE PRACTITIONER

## 2020-03-20 PROCEDURE — 3075F SYST BP GE 130 - 139MM HG: CPT | Performed by: NURSE PRACTITIONER

## 2020-03-20 PROCEDURE — 1036F TOBACCO NON-USER: CPT | Performed by: NURSE PRACTITIONER

## 2020-03-20 PROCEDURE — 3079F DIAST BP 80-89 MM HG: CPT | Performed by: NURSE PRACTITIONER

## 2020-03-20 RX ORDER — AMIODARONE HYDROCHLORIDE 100 MG/1
100 TABLET ORAL DAILY
COMMUNITY
Start: 2020-03-03

## 2020-03-20 NOTE — ASSESSMENT & PLAN NOTE
Wt Readings from Last 3 Encounters:   01/31/20 96 9 kg (213 lb 9 6 oz)   10/16/19 78 kg (172 lb)   08/28/19 99 2 kg (218 lb 9 6 oz)     Weight stable per patient  Weight every day and call with 3 pound or more weight gain in 24 hours

## 2020-03-20 NOTE — ASSESSMENT & PLAN NOTE
Lab Results   Component Value Date    HGBA1C 9 3 (H) 03/13/2020   increase basaglar to 30 units daily and cont 5 units humalog tid with meals  Call in one week, will adjust accordingly

## 2020-03-20 NOTE — PROGRESS NOTES
Virtual Regular Visit    Reason for visit is follow up to chronic medical conditions  Feeling well    Encounter provider GIANLUCA Dean    Provider located at [place] [id number] [facility]       Recent Visits  No visits were found meeting these conditions  Showing recent visits within past 7 days and meeting all other requirements     Future Appointments  No visits were found meeting these conditions  Showing future appointments within next 150 days and meeting all other requirements        After connecting through [employer], the patient was identified by name and date of birth  Georgie Handy was informed that this is a telemedicine visit and that the visit is being conducted through telephone which may not be secure and therefore, might not be HIPAA-compliant  My office door was closed  No one else was in the room  She acknowledged consent and understanding of privacy and security of the video platform  The patient has agreed to participate and understands they can discontinue the visit at any time  Subjective  Georgie Handy is a 64 y o  female follow up to chronic medical conditions        bs ranging between 250 and 350  Eating healthy  Having vna doing dressing changes and wound vac check on for the next two months  Following with trauma surgery for wound  Did not get skin graft  Was at cardiologist few weeks ago  He felt patient is depressed and anxious and recommended medications  Would like to know what meds are started because he will need to do ekg in one week  She is not suicidal or homicidal  Tolerating meds without side affects  Weight is stable, no signs of heart failure  On amiodarone and states no rapid heart beat            Past Medical History:   Diagnosis Date    [facility]     CHF [facility]congestive heart failure) [facility]     Diabetes [facility]     Necrotizing fasciitis ([facility] )        Past Surgical History:   Procedure Laterality Date    CARDIAC ELECTROPHYSIOLOGY MAPPING AND ABLATION      EXCESSIVE THIGH / Elby Bal / BUTTOCK / FLANK SKIN EXCISION Right     necrotizing fascitis removal- right hip    HYSTERECTOMY  1996       Current Outpatient Medications   Medication Sig Dispense Refill    amiodarone 100 mg tablet Take 100 mg by mouth daily      acetaminophen (TYLENOL) 500 mg tablet Take 500 mg by mouth every 6 (six) hours as needed      amLODIPine (NORVASC) 5 mg tablet Take 5 mg by mouth daily  5    apixaban (ELIQUIS) 5 mg Take 5 mg by mouth      atorvastatin (LIPITOR) 80 mg tablet Take 80 mg by mouth      B-D ULTRAFINE III SHORT PEN 31G X 8 MM MISC Inject under the skin 4 (four) times a day 200 each 5    Blood Glucose Monitoring Suppl (ACCU-CHEK GUIDE ME) w/Device KIT Test once or twice daily, e11 9 1 kit 0    furosemide (LASIX) 40 mg tablet Take 40 mg by mouth      glucose blood (ACCU-CHEK MONICA PLUS) test strip 1 each by Other route 2 (two) times a day Use as instructed 100 each 5    insulin glargine (Basaglar KwikPen) 100 units/mL injection pen Inject 30 Units under the skin daily 5 pen 3    insulin lispro (ADMELOG SOLOSTAR) 100 units/mL injection pen Inject 5 Units under the skin 3 (three) times a day with meals 5 pen 3    insulin lispro (HumaLOG) 100 units/mL injection Inject 5 Units under the skin      Lancet Devices (ACCU-CHEK SOFTCLIX) lancets To be used B  I D 1 each 5    Lancets (ACCU-CHEK SOFT TOUCH) lancets Test once or twice daily, e11 9 accu check guide 100 each 5    lisinopril (ZESTRIL) 20 mg tablet Take 20 mg by mouth      metoprolol tartrate (LOPRESSOR) 50 mg tablet Take 75 mg by mouth      sertraline (ZOLOFT) 50 mg tablet Take 1 tablet (50 mg total) by mouth daily 30 tablet 5     No current facility-administered medications for this visit  No Known Allergies      Chio Herbert was seen today for virtual regular visit      Diagnoses and all orders for this visit:    Screening for colon cancer  -     Ambulatory referral to Gastroenterology; Future    Type 2 diabetes mellitus with other circulatory complication, with long-term current use of insulin (HCC)  -     insulin glargine (Basaglar KwikPen) 100 units/mL injection pen; Inject 30 Units under the skin daily    Mild obstructive sleep apnea    Chronic diastolic heart failure (HCC)    Dyslipidemia    Class 2 severe obesity due to excess calories with serious comorbidity and body mass index (BMI) of 36 0 to 36 9 in adult [facility])    Anxiety and depression  -     sertraline (ZOLOFT) 50 mg tablet; Take 1 tablet (50 mg total) by mouth daily    Essential hypertension      I spent 15 minutes with the patient during this visit      [id number]

## 2020-03-24 ENCOUNTER — TELEPHONE (OUTPATIENT)
Dept: FAMILY MEDICINE CLINIC | Facility: CLINIC | Age: 60
End: 2020-03-24

## 2020-03-24 NOTE — TELEPHONE ENCOUNTER
Patient called to ask if she could come to the office to have EKG done since she stared Sertraline that you prescribed      Please advise

## 2020-03-24 NOTE — TELEPHONE ENCOUNTER
Discussed w/Cici Renae is to have the f/u EKG at her cardiologist office, spoke with pt she was having trouble getting through to cardiology and she did not leave a msg with them  Advised to leave a msg for them to call her back to schedule EKG

## 2020-04-15 ENCOUNTER — TELEPHONE (OUTPATIENT)
Dept: FAMILY MEDICINE CLINIC | Facility: CLINIC | Age: 60
End: 2020-04-15

## 2020-04-22 ENCOUNTER — TELEPHONE (OUTPATIENT)
Dept: FAMILY MEDICINE CLINIC | Facility: CLINIC | Age: 60
End: 2020-04-22

## 2020-04-22 DIAGNOSIS — E11.59 TYPE 2 DIABETES MELLITUS WITH OTHER CIRCULATORY COMPLICATION, WITH LONG-TERM CURRENT USE OF INSULIN (HCC): Primary | ICD-10-CM

## 2020-04-22 DIAGNOSIS — Z79.4 TYPE 2 DIABETES MELLITUS WITH OTHER CIRCULATORY COMPLICATION, WITH LONG-TERM CURRENT USE OF INSULIN (HCC): Primary | ICD-10-CM

## 2020-04-22 RX ORDER — INSULIN LISPRO 100 [IU]/ML
5 INJECTION, SOLUTION INTRAVENOUS; SUBCUTANEOUS
Qty: 5 PEN | Refills: 2 | Status: SHIPPED | OUTPATIENT
Start: 2020-04-22

## 2020-05-22 ENCOUNTER — TELEPHONE (OUTPATIENT)
Dept: FAMILY MEDICINE CLINIC | Facility: CLINIC | Age: 60
End: 2020-05-22

## 2020-05-22 DIAGNOSIS — R50.9 FEVER, UNSPECIFIED FEVER CAUSE: Primary | ICD-10-CM

## 2020-05-28 ENCOUNTER — TELEPHONE (OUTPATIENT)
Dept: FAMILY MEDICINE CLINIC | Facility: CLINIC | Age: 60
End: 2020-05-28

## 2020-06-02 ENCOUNTER — TELEPHONE (OUTPATIENT)
Dept: FAMILY MEDICINE CLINIC | Facility: CLINIC | Age: 60
End: 2020-06-02

## 2020-06-02 ENCOUNTER — TELEMEDICINE (OUTPATIENT)
Dept: FAMILY MEDICINE CLINIC | Facility: CLINIC | Age: 60
End: 2020-06-02
Payer: COMMERCIAL

## 2020-06-02 VITALS — TEMPERATURE: 100.8 F

## 2020-06-02 DIAGNOSIS — S81.801D WOUND OF RIGHT LOWER EXTREMITY, SUBSEQUENT ENCOUNTER: ICD-10-CM

## 2020-06-02 DIAGNOSIS — E11.59 TYPE 2 DIABETES MELLITUS WITH OTHER CIRCULATORY COMPLICATION, WITH LONG-TERM CURRENT USE OF INSULIN (HCC): ICD-10-CM

## 2020-06-02 DIAGNOSIS — Z79.899 HIGH RISK MEDICATION USE: ICD-10-CM

## 2020-06-02 DIAGNOSIS — I10 ESSENTIAL HYPERTENSION: ICD-10-CM

## 2020-06-02 DIAGNOSIS — R42 DIZZINESS: ICD-10-CM

## 2020-06-02 DIAGNOSIS — Z79.4 TYPE 2 DIABETES MELLITUS WITH OTHER CIRCULATORY COMPLICATION, WITH LONG-TERM CURRENT USE OF INSULIN (HCC): ICD-10-CM

## 2020-06-02 DIAGNOSIS — R50.9 FUO (FEVER OF UNKNOWN ORIGIN): Primary | ICD-10-CM

## 2020-06-02 PROCEDURE — 99214 OFFICE O/P EST MOD 30 MIN: CPT | Performed by: NURSE PRACTITIONER

## 2020-06-03 ENCOUNTER — TELEPHONE (OUTPATIENT)
Dept: FAMILY MEDICINE CLINIC | Facility: CLINIC | Age: 60
End: 2020-06-03

## 2020-06-03 PROBLEM — R50.9 FUO (FEVER OF UNKNOWN ORIGIN): Status: ACTIVE | Noted: 2020-06-03

## 2020-06-10 ENCOUNTER — TELEPHONE (OUTPATIENT)
Dept: FAMILY MEDICINE CLINIC | Facility: CLINIC | Age: 60
End: 2020-06-10

## 2020-06-23 ENCOUNTER — TELEPHONE (OUTPATIENT)
Dept: FAMILY MEDICINE CLINIC | Facility: CLINIC | Age: 60
End: 2020-06-23

## 2020-06-24 ENCOUNTER — TELEPHONE (OUTPATIENT)
Dept: FAMILY MEDICINE CLINIC | Facility: CLINIC | Age: 60
End: 2020-06-24

## 2020-06-26 DIAGNOSIS — E11.59 TYPE 2 DIABETES MELLITUS WITH OTHER CIRCULATORY COMPLICATION, WITH LONG-TERM CURRENT USE OF INSULIN (HCC): Primary | ICD-10-CM

## 2020-06-26 DIAGNOSIS — Z79.4 TYPE 2 DIABETES MELLITUS WITH OTHER CIRCULATORY COMPLICATION, WITH LONG-TERM CURRENT USE OF INSULIN (HCC): Primary | ICD-10-CM

## 2020-07-07 ENCOUNTER — TELEPHONE (OUTPATIENT)
Dept: FAMILY MEDICINE CLINIC | Facility: CLINIC | Age: 60
End: 2020-07-07

## 2020-07-07 NOTE — TELEPHONE ENCOUNTER
Patient called and wanted to let Jaiden Montano Know that her insurance now covers the Lantus Solo star  They told her if Jaiden Montano prefers to keep her on the [address] that will need a prior auth  Patient did state that she only has less then half a pen left and will need a refill before end of the week   Please advise

## 2020-07-09 DIAGNOSIS — E11.59 TYPE 2 DIABETES MELLITUS WITH OTHER CIRCULATORY COMPLICATION, WITH LONG-TERM CURRENT USE OF INSULIN (HCC): Primary | ICD-10-CM

## 2020-07-09 DIAGNOSIS — Z79.4 TYPE 2 DIABETES MELLITUS WITH OTHER CIRCULATORY COMPLICATION, WITH LONG-TERM CURRENT USE OF INSULIN (HCC): Primary | ICD-10-CM

## 2020-07-09 NOTE — TELEPHONE ENCOUNTER
Spoke with patient offered her samples of Lantus but she wanted it sent to [facility] on [address]  Script sent to pharmacy for pt

## 2020-09-14 DIAGNOSIS — F41.9 ANXIETY AND DEPRESSION: ICD-10-CM

## 2020-09-14 DIAGNOSIS — F32.A ANXIETY AND DEPRESSION: ICD-10-CM

## 2020-10-05 ENCOUNTER — TELEPHONE (OUTPATIENT)
Dept: FAMILY MEDICINE CLINIC | Facility: CLINIC | Age: 60
End: 2020-10-05

## 2020-10-06 ENCOUNTER — TELEPHONE (OUTPATIENT)
Dept: GASTROENTEROLOGY | Facility: CLINIC | Age: 60
End: 2020-10-06

## 2020-11-01 DIAGNOSIS — F41.9 ANXIETY AND DEPRESSION: ICD-10-CM

## 2020-11-01 DIAGNOSIS — F32.A ANXIETY AND DEPRESSION: ICD-10-CM

## 2020-11-06 LAB — HBA1C MFR BLD HPLC: 8.8 %

## 2021-01-03 DIAGNOSIS — F32.A ANXIETY AND DEPRESSION: ICD-10-CM

## 2021-01-03 DIAGNOSIS — F41.9 ANXIETY AND DEPRESSION: ICD-10-CM

## 2021-02-21 DIAGNOSIS — F32.A ANXIETY AND DEPRESSION: ICD-10-CM

## 2021-02-21 DIAGNOSIS — F41.9 ANXIETY AND DEPRESSION: ICD-10-CM

## 2021-02-22 DIAGNOSIS — F41.9 ANXIETY AND DEPRESSION: ICD-10-CM

## 2021-02-22 DIAGNOSIS — F32.A ANXIETY AND DEPRESSION: ICD-10-CM

## 2021-02-22 NOTE — TELEPHONE ENCOUNTER
Requested medication(s) are due for refill today: Yes  Patient has already received a courtesy refill: No  Other reason request has been forwarded to provider:Per protocol

## 2021-03-10 DIAGNOSIS — Z23 ENCOUNTER FOR IMMUNIZATION: ICD-10-CM

## 2021-03-26 ENCOUNTER — TELEPHONE (OUTPATIENT)
Dept: FAMILY MEDICINE CLINIC | Facility: CLINIC | Age: 61
End: 2021-03-26

## 2021-03-26 NOTE — TELEPHONE ENCOUNTER
Left a voice msg for Caity that Daly Blackburn ok to verbal confirmation to draw a CBC, CMP and TSH  If [employer] has any further questions or needs to give the office a call

## 2021-03-26 NOTE — TELEPHONE ENCOUNTER
Patient having intermittent low-grade fevers for the last couple of months, not accompanied by any other symptoms, Home Care is wondering if lab work could be ordered for patient  Sheyla Lisa said she can also accept verbal confirmation to do the blood work

## 2021-03-27 DIAGNOSIS — F41.9 ANXIETY AND DEPRESSION: ICD-10-CM

## 2021-03-27 DIAGNOSIS — F32.A ANXIETY AND DEPRESSION: ICD-10-CM

## 2021-03-29 NOTE — TELEPHONE ENCOUNTER
Requested medication(s) are due for refill today: Yes  Patient has already received a courtesy refill: No  Other reason request has been forwarded to provider: Per protocol

## 2021-04-06 ENCOUNTER — TELEMEDICINE (OUTPATIENT)
Dept: FAMILY MEDICINE CLINIC | Facility: CLINIC | Age: 61
End: 2021-04-06
Payer: COMMERCIAL

## 2021-04-06 VITALS — WEIGHT: 210 LBS | HEIGHT: 63 IN | BODY MASS INDEX: 37.21 KG/M2

## 2021-04-06 DIAGNOSIS — Z79.4 TYPE 2 DIABETES MELLITUS WITH OTHER CIRCULATORY COMPLICATION, WITH LONG-TERM CURRENT USE OF INSULIN (HCC): ICD-10-CM

## 2021-04-06 DIAGNOSIS — Z12.31 ENCOUNTER FOR SCREENING MAMMOGRAM FOR MALIGNANT NEOPLASM OF BREAST: ICD-10-CM

## 2021-04-06 DIAGNOSIS — E03.9 ACQUIRED HYPOTHYROIDISM: Primary | ICD-10-CM

## 2021-04-06 DIAGNOSIS — E11.59 TYPE 2 DIABETES MELLITUS WITH OTHER CIRCULATORY COMPLICATION, WITH LONG-TERM CURRENT USE OF INSULIN (HCC): ICD-10-CM

## 2021-04-06 PROBLEM — N20.0 NEPHROLITHIASIS: Status: ACTIVE | Noted: 2020-06-18

## 2021-04-06 PROCEDURE — 3008F BODY MASS INDEX DOCD: CPT | Performed by: NURSE PRACTITIONER

## 2021-04-06 PROCEDURE — 99213 OFFICE O/P EST LOW 20 MIN: CPT | Performed by: NURSE PRACTITIONER

## 2021-04-06 PROCEDURE — 1036F TOBACCO NON-USER: CPT | Performed by: NURSE PRACTITIONER

## 2021-04-06 PROCEDURE — 3725F SCREEN DEPRESSION PERFORMED: CPT | Performed by: NURSE PRACTITIONER

## 2021-04-06 RX ORDER — DULAGLUTIDE 1.5 MG/.5ML
INJECTION, SOLUTION SUBCUTANEOUS
COMMUNITY
Start: 2021-03-05

## 2021-04-06 RX ORDER — INSULIN ASPART 100 [IU]/ML
INJECTION, SOLUTION INTRAVENOUS; SUBCUTANEOUS
COMMUNITY
Start: 2021-03-09

## 2021-04-06 RX ORDER — LEVOTHYROXINE SODIUM 0.05 MG/1
50 TABLET ORAL
Qty: 90 TABLET | Refills: 3 | Status: SHIPPED | OUTPATIENT
Start: 2021-04-06 | End: 2022-05-19

## 2021-04-06 NOTE — PROGRESS NOTES
Virtual Regular Visit      Assessment/Plan:    Problem List Items Addressed This Visit        Endocrine    Diabetes mellitus with circulatory complication (HCC)     Cont current meds    Lab Results   Component Value Date    HGBA1C 8 8 (H) 11/06/2020            Relevant Medications    Trulicity 1 5 AK/2 0SJ SOPN    insulin aspart (NovoLOG) 100 units/mL injection    insulin aspart (NovoLOG FlexPen) 100 UNIT/ML injection pen    insulin glargine (LANTUS SOLOSTAR) 100 units/mL injection pen    Acquired hypothyroidism - Primary    Relevant Medications    levothyroxine 50 mcg tablet    Other Relevant Orders    TSH, 3rd generation with Free T4 reflex      Other Visit Diagnoses     Encounter for screening mammogram for malignant neoplasm of breast        Relevant Orders    Mammo screening bilateral w cad               Reason for visit is   Chief Complaint   Patient presents with    Virtual Regular Visit        Encounter provider GIANLUCA Deluca    Provider located at [place] [id number]      Recent Visits  No visits were found meeting these conditions  Showing recent visits within past 7 days and meeting all other requirements     Today's Visits  Date Type Provider Dept   04/06/21 Telemedicine GIANLUCA Deluca  Sarah Duff   Showing today's visits and meeting all other requirements     Future Appointments  No visits were found meeting these conditions  Showing future appointments within next 150 days and meeting all other requirements        The patient was identified by name and date of birth  Ryanne Ca was informed that this is a telemedicine visit and that the visit is being conducted through [facility] and patient was informed that this is a secure, HIPAA-compliant platform  She agrees to proceed     My office door was closed  No one else was in the room    She acknowledged consent and understanding of privacy and security of the video platform  The patient has agreed to participate and understands they can discontinue the visit at any time  Patient is aware this is a billable service  Subjective  Ching Orourke is a 64 y o  female    Labs reviewed  tsh elevated to 5 97  Having fluctuations in temperature regulation for months per patient  Low grade temps low 100's  Wants to wait to see ID until after tries synthroid for regulation  If continues, will see ID  Does have wound on her leg again that VNA is managing         Past Medical History:   Diagnosis Date    [facility] fibrillation ([facility] )     [facility]     [facility]     Necrotizing fasciitis ([facility] )        Past Surgical History:   Procedure Laterality Date    CARDIAC ELECTROPHYSIOLOGY MAPPING AND ABLATION      EXCESSIVE THIGH / HIP / BUTTOCK / FLANK SKIN EXCISION Right     necrotizing fascitis removal- right hip    HYSTERECTOMY  1996       Current Outpatient Medications   Medication Sig Dispense Refill    glucose blood test strip Accu-chek Radha Plus meter        insulin aspart (NovoLOG) 100 units/mL injection Inject 12 units three times daily before meals E11 8      insulin glargine (LANTUS SOLOSTAR) 100 units/mL injection pen Inject 56 Units under the skin daily      acetaminophen (TYLENOL) 500 mg tablet Take 500 mg by mouth every 6 (six) hours as needed      amiodarone 100 mg tablet Take 100 mg by mouth daily      amLODIPine (NORVASC) 5 mg tablet Take 5 mg by mouth daily  5    apixaban (ELIQUIS) 5 mg Take 5 mg by mouth      atorvastatin (LIPITOR) 80 mg tablet Take 80 mg by mouth      B-D ULTRAFINE III SHORT PEN 31G X 8 MM MISC Inject under the skin 4 (four) times a day 200 each 5    Blood Glucose Monitoring Suppl (ACCU-CHEK GUIDE ME) w/Device KIT Test once or twice daily, e11 9 1 kit 0    furosemide (LASIX) 40 mg tablet Take 40 mg by mouth      glucose blood (ACCU-CHEK RADHA PLUS) test strip 1 each by Other route 2 (two) times a day Use as instructed 100 each 5    insulin aspart (NovoLOG FlexPen) 100 UNIT/ML injection pen INJECT 12 UNITS THREE TIMES DAILY BEFORE MEAL(S)      insulin glargine (Lantus SoloStar) 100 units/mL injection pen Inject 30 Units under the skin daily 5 pen 3    insulin lispro (HumaLOG KwikPen) 100 units/mL injection pen Inject 5 Units under the skin 3 (three) times a day with meals 5 pen 2    Lancet Devices (ACCU-CHEK SOFTCLIX) lancets To be used B  I D 1 each 5    Lancets (ACCU-CHEK SOFT TOUCH) lancets Test once or twice daily, e11 9 accu check guide 100 each 5    levothyroxine 50 mcg tablet Take 1 tablet (50 mcg total) by mouth daily in the early morning 90 tablet 3    lisinopril (ZESTRIL) 20 mg tablet Take 20 mg by mouth      metoprolol tartrate (LOPRESSOR) 50 mg tablet Take 75 mg by mouth      sertraline (ZOLOFT) 50 mg tablet Take 1 tablet by mouth once daily 30 tablet 5    Trulicity 1 5 EK/4 4SD SOPN INJECT 1 SUBCUTANEOUSLY ONCE A WEEK       No current facility-administered medications for this visit  No Known Allergies    Review of Systems    Video Exam    Vitals:    04/06/21 0844   Weight: 95 3 kg (210 lb)   Height: 5' 3" (1 6 m)       Physical Exam  Constitutional:       Appearance: Normal appearance  HENT:      Head: Normocephalic and atraumatic  Eyes:      Conjunctiva/sclera: Conjunctivae normal    Neck:      Musculoskeletal: Normal range of motion  Pulmonary:      Effort: Pulmonary effort is normal    Musculoskeletal: Normal range of motion  Neurological:      Mental Status: She is alert and oriented to person, place, and time  Psychiatric:         Mood and Affect: Mood normal          Behavior: Behavior normal       Depression Screening Follow-up Plan: Patient's depression screening was positive with a PHQ-2 score of 2  Their PHQ-9 score was 5  Patient assessed for underlying major depression  They have no active suicidal ideations   Brief counseling provided and recommend additional follow-up/re-evaluation next office visit  BMI Counseling: Body mass index is 37 2 kg/m²  The BMI is above normal  Nutrition recommendations include reducing portion sizes, decreasing overall calorie intake, 3-5 servings of fruits/vegetables daily, reducing fast food intake, consuming healthier snacks, decreasing soda and/or juice intake, moderation in carbohydrate intake, increasing intake of lean protein, reducing intake of saturated fat and trans fat and reducing intake of cholesterol  Exercise recommendations include moderate aerobic physical activity for 150 minutes/week, exercising 3-5 times per week and strength training exercises  I spent 15 minutes with patient today in which greater than 50% of the time was spent in counseling/coordination of care regarding thyroid, FUO      VIRTUAL VISIT DISCLAIMER    Chiqui Adair acknowledges that she has consented to an online visit or consultation  She understands that the online visit is based solely on information provided by her, and that, in the absence of a face-to-face physical evaluation by the physician, the diagnosis she receives is both limited and provisional in terms of accuracy and completeness  This is not intended to replace a full medical face-to-face evaluation by the physician  Chiqui Adair understands and accepts these terms

## 2021-11-08 DIAGNOSIS — F32.A ANXIETY AND DEPRESSION: ICD-10-CM

## 2021-11-08 DIAGNOSIS — F41.9 ANXIETY AND DEPRESSION: ICD-10-CM

## 2021-12-02 ENCOUNTER — OFFICE VISIT (OUTPATIENT)
Dept: FAMILY MEDICINE CLINIC | Facility: CLINIC | Age: 61
End: 2021-12-02
Payer: COMMERCIAL

## 2021-12-02 DIAGNOSIS — E03.9 ACQUIRED HYPOTHYROIDISM: ICD-10-CM

## 2021-12-02 DIAGNOSIS — E78.5 DYSLIPIDEMIA: ICD-10-CM

## 2021-12-02 DIAGNOSIS — Z12.11 COLON CANCER SCREENING: ICD-10-CM

## 2021-12-02 DIAGNOSIS — F41.9 ANXIETY AND DEPRESSION: ICD-10-CM

## 2021-12-02 DIAGNOSIS — Z79.4 TYPE 2 DIABETES MELLITUS WITH OTHER CIRCULATORY COMPLICATION, WITH LONG-TERM CURRENT USE OF INSULIN (HCC): ICD-10-CM

## 2021-12-02 DIAGNOSIS — F32.A ANXIETY AND DEPRESSION: ICD-10-CM

## 2021-12-02 DIAGNOSIS — Z12.31 ENCOUNTER FOR SCREENING MAMMOGRAM FOR MALIGNANT NEOPLASM OF BREAST: ICD-10-CM

## 2021-12-02 DIAGNOSIS — Z00.00 ANNUAL PHYSICAL EXAM: Primary | ICD-10-CM

## 2021-12-02 DIAGNOSIS — Z95.0 BIVENTRICULAR CARDIAC PACEMAKER IN SITU: ICD-10-CM

## 2021-12-02 DIAGNOSIS — E66.01 CLASS 3 SEVERE OBESITY DUE TO EXCESS CALORIES WITH SERIOUS COMORBIDITY AND BODY MASS INDEX (BMI) OF 40.0 TO 44.9 IN ADULT (HCC): ICD-10-CM

## 2021-12-02 DIAGNOSIS — E11.59 TYPE 2 DIABETES MELLITUS WITH OTHER CIRCULATORY COMPLICATION, WITH LONG-TERM CURRENT USE OF INSULIN (HCC): ICD-10-CM

## 2021-12-02 DIAGNOSIS — G47.33 MILD OBSTRUCTIVE SLEEP APNEA: ICD-10-CM

## 2021-12-02 PROBLEM — I44.2 COMPLETE AV BLOCK DUE TO AV NODAL ABLATION (HCC): Status: ACTIVE | Noted: 2021-11-16

## 2021-12-02 PROBLEM — I48.4 ATYPICAL ATRIAL FLUTTER (HCC): Status: ACTIVE | Noted: 2021-10-04

## 2021-12-02 PROBLEM — I97.190 COMPLETE AV BLOCK DUE TO AV NODAL ABLATION (HCC): Status: ACTIVE | Noted: 2021-11-16

## 2021-12-02 PROCEDURE — 3079F DIAST BP 80-89 MM HG: CPT | Performed by: NURSE PRACTITIONER

## 2021-12-02 PROCEDURE — 99396 PREV VISIT EST AGE 40-64: CPT | Performed by: NURSE PRACTITIONER

## 2021-12-02 PROCEDURE — 3075F SYST BP GE 130 - 139MM HG: CPT | Performed by: NURSE PRACTITIONER

## 2021-12-06 VITALS
HEART RATE: 101 BPM | BODY MASS INDEX: 43.05 KG/M2 | SYSTOLIC BLOOD PRESSURE: 132 MMHG | WEIGHT: 243 LBS | TEMPERATURE: 98.7 F | OXYGEN SATURATION: 95 % | DIASTOLIC BLOOD PRESSURE: 80 MMHG | RESPIRATION RATE: 16 BRPM | HEIGHT: 63 IN

## 2021-12-06 PROBLEM — E66.813 CLASS 3 SEVERE OBESITY DUE TO EXCESS CALORIES WITH SERIOUS COMORBIDITY AND BODY MASS INDEX (BMI) OF 40.0 TO 44.9 IN ADULT (HCC): Status: ACTIVE | Noted: 2019-02-12

## 2021-12-06 PROBLEM — R50.9 FUO (FEVER OF UNKNOWN ORIGIN): Status: RESOLVED | Noted: 2020-06-03 | Resolved: 2021-12-06

## 2021-12-06 PROBLEM — S81.801A WOUND OF RIGHT LEG: Status: RESOLVED | Noted: 2020-02-05 | Resolved: 2021-12-06

## 2021-12-06 PROBLEM — R42 DIZZINESS: Status: RESOLVED | Noted: 2019-08-28 | Resolved: 2021-12-06

## 2021-12-30 ENCOUNTER — VBI (OUTPATIENT)
Dept: ADMINISTRATIVE | Facility: OTHER | Age: 61
End: 2021-12-30

## 2022-03-23 ENCOUNTER — VBI (OUTPATIENT)
Dept: ADMINISTRATIVE | Facility: OTHER | Age: 62
End: 2022-03-23

## 2022-05-18 DIAGNOSIS — E03.9 ACQUIRED HYPOTHYROIDISM: ICD-10-CM

## 2022-05-19 RX ORDER — LEVOTHYROXINE SODIUM 0.05 MG/1
TABLET ORAL
Qty: 90 TABLET | Refills: 0 | Status: SHIPPED | OUTPATIENT
Start: 2022-05-19

## 2022-06-09 ENCOUNTER — TELEPHONE (OUTPATIENT)
Dept: FAMILY MEDICINE CLINIC | Facility: CLINIC | Age: 62
End: 2022-06-09

## 2022-06-10 ENCOUNTER — OFFICE VISIT (OUTPATIENT)
Dept: FAMILY MEDICINE CLINIC | Facility: CLINIC | Age: 62
End: 2022-06-10
Payer: COMMERCIAL

## 2022-06-10 VITALS
HEIGHT: 63 IN | DIASTOLIC BLOOD PRESSURE: 80 MMHG | WEIGHT: 248.6 LBS | TEMPERATURE: 96.8 F | OXYGEN SATURATION: 98 % | RESPIRATION RATE: 16 BRPM | BODY MASS INDEX: 44.05 KG/M2 | HEART RATE: 96 BPM | SYSTOLIC BLOOD PRESSURE: 138 MMHG

## 2022-06-10 DIAGNOSIS — E66.01 CLASS 3 SEVERE OBESITY DUE TO EXCESS CALORIES WITH SERIOUS COMORBIDITY AND BODY MASS INDEX (BMI) OF 40.0 TO 44.9 IN ADULT (HCC): ICD-10-CM

## 2022-06-10 DIAGNOSIS — I10 ESSENTIAL HYPERTENSION: ICD-10-CM

## 2022-06-10 DIAGNOSIS — I50.32 CHRONIC DIASTOLIC HEART FAILURE (HCC): ICD-10-CM

## 2022-06-10 DIAGNOSIS — Z79.4 TYPE 2 DIABETES MELLITUS WITH OTHER CIRCULATORY COMPLICATION, WITH LONG-TERM CURRENT USE OF INSULIN (HCC): Primary | ICD-10-CM

## 2022-06-10 DIAGNOSIS — I97.190 COMPLETE AV BLOCK DUE TO AV NODAL ABLATION (HCC): ICD-10-CM

## 2022-06-10 DIAGNOSIS — Z12.31 ENCOUNTER FOR SCREENING MAMMOGRAM FOR BREAST CANCER: ICD-10-CM

## 2022-06-10 DIAGNOSIS — I48.4 ATYPICAL ATRIAL FLUTTER (HCC): ICD-10-CM

## 2022-06-10 DIAGNOSIS — I44.2 COMPLETE AV BLOCK DUE TO AV NODAL ABLATION (HCC): ICD-10-CM

## 2022-06-10 DIAGNOSIS — E78.5 DYSLIPIDEMIA: ICD-10-CM

## 2022-06-10 DIAGNOSIS — E11.59 TYPE 2 DIABETES MELLITUS WITH OTHER CIRCULATORY COMPLICATION, WITH LONG-TERM CURRENT USE OF INSULIN (HCC): Primary | ICD-10-CM

## 2022-06-10 DIAGNOSIS — E03.9 ACQUIRED HYPOTHYROIDISM: ICD-10-CM

## 2022-06-10 LAB — SL AMB POCT HEMOGLOBIN AIC: 8.7 (ref ?–6.5)

## 2022-06-10 PROCEDURE — 3052F HG A1C>EQUAL 8.0%<EQUAL 9.0%: CPT | Performed by: NURSE PRACTITIONER

## 2022-06-10 PROCEDURE — 3075F SYST BP GE 130 - 139MM HG: CPT | Performed by: NURSE PRACTITIONER

## 2022-06-10 PROCEDURE — 83036 HEMOGLOBIN GLYCOSYLATED A1C: CPT | Performed by: NURSE PRACTITIONER

## 2022-06-10 PROCEDURE — 99214 OFFICE O/P EST MOD 30 MIN: CPT | Performed by: NURSE PRACTITIONER

## 2022-06-10 PROCEDURE — 3079F DIAST BP 80-89 MM HG: CPT | Performed by: NURSE PRACTITIONER

## 2022-06-10 NOTE — PROGRESS NOTES
FAMILY PRACTICE OFFICE VISIT       NAME: Hilda Etienne  AGE: 63 y o  SEX: female       : 1960        MRN: [id number]    DATE: 2022  TIME: 2:56 PM    Assessment and Plan   1  Type 2 diabetes mellitus with other circulatory complication, with long-term current use of insulin ([facility] )  Assessment & Plan:  Cont meds and f/u with endocrinology    Lab Results   Component Value Date    HGBA1C 8 7 (A) 06/10/2022       Orders:  -     POCT hemoglobin A1c  -     Comprehensive metabolic panel; Future  -     CBC and differential; Future  -     TSH, 3rd generation with Free T4 reflex; Future  -     Lipid Panel with Direct LDL reflex; Future  -     Microalbumin / creatinine urine ratio    2  Acquired hypothyroidism  Assessment & Plan:  Euthyroid  Labs  Cont meds        3  Essential hypertension  Assessment & Plan:  Stable  Cont meds  Cont f/u with cardiology        4  Chronic diastolic heart failure [facility])  Assessment & Plan:  Wt Readings from Last 3 Encounters:   06/10/22 113 kg (248 lb 9 6 oz)   21 110 kg (243 lb)   21 95 3 kg (210 lb)     Monitor weights  Low sodium diet            5  Atypical atrial flutter (HCC)  Assessment & Plan:  Rate controlled        6  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 [facility])  Assessment & Plan:  S/p permanent pacemaker        7  Dyslipidemia  Assessment & Plan:  Stable  Cont meds  Low fat diet        8  Class 3 severe obesity due to excess calories with serious comorbidity and body mass index (BMI) of 40 0 to 44 9 in adult ([facility] )    9  Encounter for screening mammogram for breast cancer  -     Comprehensive metabolic panel; Future  -     CBC and differential; Future  -     TSH, 3rd generation with Free T4 reflex; Future  -     Lipid Panel with Direct LDL reflex;  Future  -     Microalbumin / creatinine urine ratio                 Chief Complaint     Chief Complaint   Patient presents with    Follow-up    Hypertension    Hypothyroidism    Diabetes    Atrial Fibrillation History of Present Illness   Ge Lanier is a 63y o -year-old female who is here for f/u to chronic medical conditions  Feeling well overall  Cont to follow with endo and cardiology  Had PPM placed      Review of Systems   Review of Systems   Constitutional: Negative for fatigue and fever  HENT: Negative for congestion, postnasal drip and rhinorrhea  Eyes: Negative for photophobia and visual disturbance  Respiratory: Negative for cough, shortness of breath and wheezing  Cardiovascular: Negative for chest pain and palpitations  Gastrointestinal: Negative for constipation, diarrhea, nausea and vomiting  Genitourinary: Negative for dysuria and frequency  Musculoskeletal: Negative for arthralgias and myalgias  Skin: Negative for rash  Neurological: Negative for dizziness, light-headedness and headaches  Hematological: Negative for adenopathy  Psychiatric/Behavioral: Negative for dysphoric mood and sleep disturbance  The patient is not nervous/anxious          Active Problem List     Patient Active Problem List   Diagnosis    Chronic anticoagulation    Chronic diastolic heart failure ([facility] [place] [facility] )    Diabetes mellitus with circulatory complication (HCC)    Essential hypertension    Memory loss    Mild obstructive sleep apnea    On amiodarone therapy    Vibration sensory loss    Class 3 severe obesity due to excess calories with serious comorbidity and body mass index (BMI) of 40 0 to 44 9 in adult ([facility] [place] [facility] )    Dyslipidemia    High risk medication use    History of atrial fibrillation    Anxiety and depression    Nephrolithiasis    Acquired hypothyroidism    Atypical [facility] flutter ([facility] )    S/P placement of cardiac pacemaker    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 [facility])         Past Medical History:  Past Medical History:   Diagnosis Date    Atrial fibrillation ([facility] [place] [facility] )     CHF (congestive heart failure) (HCC)     Diabetes ([facility] )     Necrotizing fasciitis ([facility] [place] [facility] )        Past Surgical History:  Past Surgical History:   Procedure Laterality Date    CARDIAC ELECTROPHYSIOLOGY MAPPING AND ABLATION      EXCESSIVE THIGH / Val Bounds / BUTTOCK / FLANK SKIN EXCISION Right     necrotizing fascitis removal- right hip    HYSTERECTOMY         Family History:  Family History   Problem Relation Age of Onset    Hypertension Mother     Coronary artery disease Father    Laura Isaac Diabetes Father     Hypertension Father        Social History:  Social History     Socioeconomic History    Marital status:      Spouse name: Not on file    Number of children: Not on file    Years of education: Not on file    Highest education level: Not on file   Occupational History    Not on file   Tobacco Use    Smoking status: Former Smoker     Types: Cigarettes     Quit date: 2014     Years since quittin 4    Smokeless tobacco: Never Used   Vaping Use    Vaping Use: Never used   Substance and Sexual Activity    Alcohol use: Not Currently     Comment: Rarely consumes     Drug use: Not on file    Sexual activity: Yes     Birth control/protection: Surgical   Other Topics Concern    Not on file   Social History Narrative    Not on file     Social Determinants of Health     Financial Resource Strain: Not on file   Food Insecurity: Not on file   Transportation Needs: Not on file   Physical Activity: Not on file   Stress: Not on file   Social Connections: Not on file   Intimate Partner Violence: Not on file   Housing Stability: Not on file       Objective     Vitals:    06/10/22 1312   BP: 138/80   Pulse: 96   Resp: 16   Temp: (!) 96 8 °F (36 °C)   SpO2: 98%     Wt Readings from Last 3 Encounters:   06/10/22 113 kg (248 lb 9 6 oz)   21 110 kg (243 lb)   21 95 3 kg (210 lb)       Physical Exam  Vitals and nursing note reviewed  Constitutional:       Appearance: Normal appearance  HENT:      Head: Normocephalic and atraumatic        Right Ear: Tympanic membrane, ear canal and external ear normal       Left Ear: Tympanic membrane, ear canal and external ear normal       Mouth/Throat:      Pharynx: Oropharynx is clear  Eyes:      Conjunctiva/sclera: Conjunctivae normal    Cardiovascular:      Rate and Rhythm: Normal rate and regular rhythm  Pulses: Normal pulses  Heart sounds: Normal heart sounds  Pulmonary:      Effort: Pulmonary effort is normal    Abdominal:      General: Bowel sounds are normal    Musculoskeletal:         General: Normal range of motion  Cervical back: Normal range of motion and neck supple  Skin:     General: Skin is warm and dry  Capillary Refill: Capillary refill takes less than 2 seconds  Neurological:      General: No focal deficit present  Mental Status: She is alert and oriented to person, place, and time  Psychiatric:         Mood and Affect: Mood normal          Behavior: Behavior normal          Thought Content:  Thought content normal          Judgment: Judgment normal          Pertinent Laboratory/Diagnostic Studies:  No results found for: GLUCOSE, BUN, CREATININE, CALCIUM, NA, K, CO2, CL  No results found for: ALT, AST, GGT, ALKPHOS, BILITOT    No results found for: WBC, HGB, HCT, MCV, PLT    No results found for: TSH    No results found for: CHOL  No results found for: TRIG  No results found for: HDL  No results found for: [facility]  Lab Results   Component Value Date    HGBA1C 8 7 (A) 06/10/2022       Results for orders placed or performed in visit on 06/10/22   POCT hemoglobin A1c   Result Value Ref Range    Hemoglobin A1C 8 7 (A) 6 5       Orders Placed This Encounter   Procedures    Comprehensive metabolic panel    CBC and differential    TSH, 3rd generation with Free T4 reflex    Lipid Panel with Direct LDL reflex    Microalbumin / creatinine urine ratio    POCT hemoglobin A1c       ALLERGIES:  No Known Allergies    Current Medications     Current Outpatient Medications   Medication Sig Dispense Refill    acetaminophen (TYLENOL) 500 mg tablet Take 500 mg by mouth every 6 (six) hours as needed      amiodarone 100 mg tablet Take 100 mg by mouth daily      amLODIPine (NORVASC) 5 mg tablet Take 5 mg by mouth daily  5    apixaban (ELIQUIS) 5 mg Take 5 mg by mouth 2 (two) times a day        atorvastatin (LIPITOR) 80 mg tablet Take 80 mg by mouth      B-D ULTRAFINE III SHORT PEN 31G X 8 MM MISC Inject under the skin 4 (four) times a day 200 each 5    Blood Glucose Monitoring Suppl (ACCU-CHEK GUIDE ME) w/Device KIT Test once or twice daily, e11 9 1 kit 0    Cholecalciferol 25 MCG (1000 UT) tablet Take 2,000 Units by mouth daily      furosemide (LASIX) 40 mg tablet Take 40 mg by mouth      glucose blood (ACCU-CHEK MONICA PLUS) test strip 1 each by Other route 2 (two) times a day Use as instructed 100 each 5    glucose blood test strip Accu-chek Monica Plus meter   insulin aspart, w/niacinamide, (FIASP) 100 Units/mL injection pen Inject 12 units three times daily before meals E11 8      insulin glargine (LANTUS SOLOSTAR) 100 units/mL injection pen Inject 56 Units under the skin daily      insulin lispro (HumaLOG KwikPen) 100 units/mL injection pen Inject 5 Units under the skin 3 (three) times a day with meals (Patient taking differently: Inject 15 Units under the skin 3 (three) times a day with meals) 5 pen 2    Lancet Devices (ACCU-CHEK SOFTCLIX) lancets To be used B  I D 1 each 5    Lancets (ACCU-CHEK SOFT TOUCH) lancets Test once or twice daily, e11 9 accu check guide 100 each 5    levothyroxine 50 mcg tablet TAKE 1 TABLET BY MOUTH ONCE DAILY IN  THE  EARLY  MORNING 90 tablet 0    lisinopril (ZESTRIL) 20 mg tablet Take 20 mg by mouth      metoprolol tartrate (LOPRESSOR) 50 mg tablet Take 25 mg by mouth 2 (two) times a day        sertraline (ZOLOFT) 50 mg tablet Take 1 tablet (50 mg total) by mouth daily 30 tablet 5    Trulicity 1 5 PK/9 0PB SOPN INJECT 1 SUBCUTANEOUSLY ONCE A WEEK      insulin aspart (NovoLOG FlexPen) 100 UNIT/ML injection pen INJECT 12 UNITS THREE TIMES DAILY BEFORE MEAL(S) (Patient not taking: No sig reported)      insulin aspart (NovoLOG) 100 units/mL injection Inject 12 units three times daily before meals E11 8 (Patient not taking: No sig reported)      insulin glargine (Lantus SoloStar) 100 units/mL injection pen Inject 30 Units under the skin daily (Patient not taking: No sig reported) 5 pen 3     No current facility-administered medications for this visit  Health Maintenance     Health Maintenance   Topic Date Due    DTaP,Tdap,and Td Vaccines (1 - Tdap) Never done    Cervical Cancer Screening  Never done    Breast Cancer Screening: Mammogram  Never done    Colorectal Cancer Screening  Never done    DM Eye Exam  08/08/2017    COVID-19 Vaccine (3 - Booster for Moderna series) 10/08/2021    Pneumococcal Vaccine: Pediatrics (0 to 5 Years) and At-Risk Patients (6 to 59 Years) (2 - PCV) 06/10/2023 (Originally 9/30/2014)    Hepatitis C Screening  01/02/2024 (Originally 1960)    HIV Screening  06/10/2024 (Originally 7/22/1975)    Annual Physical  12/02/2022    Diabetic Foot Exam  12/06/2022    HEMOGLOBIN A1C  12/10/2022    BMI: Followup Plan  06/10/2023    BMI: Adult  06/10/2023    Influenza Vaccine  Completed    HIB Vaccine  Aged Out    Hepatitis B Vaccine  Aged Out    IPV Vaccine  Aged Out    Hepatitis A Vaccine  Aged Out    Meningococcal ACWY Vaccine  Aged Out    HPV Vaccine  Aged Out     Immunization History   Administered Date(s) Administered    COVID-19 MODERNA VACC 0 5 ML IM 04/10/2021, 05/08/2021    Fluzone Split Quad 0 25 mL 10/29/2018    INFLUENZA 01/13/2018, 10/29/2018, 10/31/2020, 12/03/2021    Influenza, injectable, quadrivalent, preservative free 0 5 mL 11/13/2019    Pneumococcal Polysaccharide PPV23 08/01/2010, 09/30/2013    Zoster 09/28/2018    Zoster Vaccine Recombinant 09/28/2018, 02/16/2019     BMI Counseling: Body mass index is 44 05 kg/m²  The BMI is above normal  Nutrition recommendations include decreasing portion sizes, encouraging healthy choices of fruits and vegetables, decreasing fast food intake, consuming healthier snacks, limiting drinks that contain sugar, moderation in carbohydrate intake, increasing intake of lean protein, reducing intake of saturated and trans fat and reducing intake of cholesterol  Exercise recommendations include moderate physical activity 150 minutes/week, exercising 3-5 times per week and strength training exercises  No pharmacotherapy was ordered  Rationale for BMI follow-up plan is due to patient being overweight or obese         Recent Results (from the past 168 hour(s))   POCT hemoglobin A1c    Collection Time: 06/10/22  1:37 PM   Result Value Ref Range    Hemoglobin A1C 8 7 (A) 6 5         GIANLUCA Liang

## 2022-06-13 NOTE — PATIENT INSTRUCTIONS
10% - bad control"> 10% - bad control,Hemoglobin A1c (HbA1c) greater than 10% indicating poor diabetic control,Haemoglobin A1c greater than 10% indicating poor diabetic control">   Diabetes Mellitus Type 2 in Adults, Ambulatory Care   GENERAL INFORMATION:   Diabetes mellitus type 2  is a disease that affects how your body uses glucose (sugar)  Insulin helps move sugar out of the blood so it can be used for energy  Normally, when the blood sugar level increases, the pancreas makes more insulin  Type 2 diabetes develops because either the body cannot make enough insulin, or it cannot use the insulin correctly  After many years, your pancreas may stop making insulin  Common symptoms include the following:   · More hunger or thirst than usual     · Frequent urination     · Weight loss without trying     · Blurred vision  Seek immediate care for the following symptoms:   · Severe abdominal pain, or pain that spreads to your back  You may also be vomiting  · Trouble staying awake or focusing    · Shaking or sweating    · Blurred or double vision    · Breath has a fruity, sweet smell    · Breathing is deep and labored, or rapid and shallow    · Heartbeat is fast and weak  Treatment for diabetes mellitus type 2  includes keeping your blood sugar at a normal level  You must eat the right foods, and exercise regularly  You may also need medicine if you cannot control your blood sugar level with nutrition and exercise  Manage diabetes mellitus type 2:   · Check your blood sugar level  You will be taught how to check a small drop of blood in a glucose monitor  Ask your healthcare provider when and how often to check during the day  Ask your healthcare provider what your blood sugar levels should be when you check them  · Keep track of carbohydrates (sugar and starchy foods)  Your blood sugar level can get too high if you eat too many carbohydrates   Your dietitian will help you plan meals and snacks that have the right amount of carbohydrates  · Eat low-fat foods  Some examples are skinless chicken and low-fat milk  · Eat less sodium (salt)  Some examples of high-sodium foods to limit are soy sauce, potato chips, and soup  Do not add salt to food you cook  Limit your use of table salt  · Eat high-fiber foods  Foods that are a good source of fiber include vegetables, whole grain bread, and beans  · Limit alcohol  Alcohol affects your blood sugar level and can make it harder to manage your diabetes  Women should limit alcohol to 1 drink a day  Men should limit alcohol to 2 drinks a day  A drink of alcohol is 12 ounces of beer, 5 ounces of wine, or 1½ ounces of liquor  · Get regular exercise  Exercise can help keep your blood sugar level steady, decrease your risk of heart disease, and help you lose weight  Exercise for at least 30 minutes, 5 days a week  Include muscle strengthening activities 2 days each week  Work with your healthcare provider to create an exercise plan  · Check your feet each day  for injuries or open sores  Ask your healthcare provider for activities you can do if you have an open sore  · Quit smoking  If you smoke, it is never too late to quit  Smoking can worsen the problems that may occur with diabetes  Ask your healthcare provider for information about how to stop smoking if you are having trouble quitting  · Ask about your weight:  Ask healthcare providers if you need to lose weight, and how much to lose  Ask them to help you with a weight loss program  Even a 10 to 15 pound weight loss can help you manage your blood sugar level  · Carry medical alert identification  Wear medical alert jewelry or carry a card that says you have diabetes  Ask your healthcare provider where to get these items  · Ask about vaccines  Diabetes puts you at risk of serious illness if you get the flu, pneumonia, or hepatitis   Ask your healthcare provider if you should get a flu, pneumonia, or hepatitis B vaccine, and when to get the vaccine  Follow up with your healthcare provider as directed:  Write down your questions so you remember to ask them during your visits  CARE AGREEMENT:   You have the right to help plan your care  Learn about your health condition and how it may be treated  Discuss treatment options with your caregivers to decide what care you want to receive  You always have the right to refuse treatment  The above information is an  only  It is not intended as medical advice for individual conditions or treatments  Talk to your doctor, nurse or pharmacist before following any medical regimen to see if it is safe and effective for you  © 2014 [address] is for End User's use only and may not be sold, redistributed or otherwise used for commercial purposes  All illustrations and images included in CareNotes® are the copyrighted property of [employer]  or Pete Davis  Foot Care for People with Diabetes   AMBULATORY CARE:   What you need to know about foot care:   · Foot care helps protect your feet and prevent foot ulcers or sores  Long-term high blood sugar levels can damage the blood vessels and nerves in your legs and feet  This damage makes it hard to feel pressure, pain, temperature, and touch  You may not be able to feel a cut or sore, or shoes that are too tight  Foot care is needed to prevent serious problems, such as an infection or amputation  · Diabetes may cause your toes to become crooked or curved under  These changes may affect the way you walk and can lead to increased pressure on your foot  The pressure can decrease blood flow to your feet  Lack of blood flow increases your risk for a foot ulcer  Do not ignore small problems, such as dry skin or small wounds  These can become life-threatening over time without proper care      Call your care team provider if:   · Your feet become numb, weak, or hard to move  · You have pus draining from a sore on your foot  · You have a wound on your foot that gets bigger, deeper, or does not heal      · You see blisters, cuts, scratches, calluses, or sores on your foot  · You have a fever, and your feet become red, warm, and swollen  · Your toenails become thick, curled, or yellow  · You find it hard to check your feet because your vision is poor  · You have questions or concerns about your condition or care  How to care for your feet:   · Check your feet each day  Look at your whole foot, including the bottom, and between and under your toes  Check for wounds, corns, and calluses  Use a mirror to see the bottom of your feet  The skin on your feet may be shiny, tight, or darker than normal  Your feet may also be cold and pale  Feel your feet by running your hands along the tops, bottoms, sides, and between your toes  Redness, swelling, and warmth are signs of blood flow problems that can lead to a foot ulcer  Do not try to remove corns or calluses yourself  · Wash your feet each day with soap and warm water  Do not use hot water, because this can injure your foot  Dry your feet gently with a towel after you wash them  Dry between and under your toes  · Apply lotion or a moisturizer on your dry feet  Ask your care team provider what lotions are best to use  Do not put lotion or moisturizer between your toes  Moisture between your toes could lead to skin breakdown  · Cut your toenails correctly  File or cut your toenails straight across  Use a soft brush to clean around your toenails  If your toenails are very thick, you may need to have a care team provider or specialist cut them  · Protect your feet  Do not walk barefoot or wear your shoes without socks  Check your shoes for rocks or other objects that can hurt your feet  Wear cotton socks to help keep your feet dry   Wear socks without toe seams, or wear them with the seams inside out  Change your socks each day  Do not wear socks that are dirty or damp  · Wear shoes that fit well  Wear shoes that do not rub against any area of your feet  Your shoes should be ½ to ¾ inch (1 to 2 centimeters) longer than your feet  Your shoes should also have extra space around the widest part of your feet  Walking or athletic shoes with laces or straps that adjust are best  Ask your care team provider for help to choose shoes that fit you best  Ask him or her if you need to wear an insert, orthotic, or bandage on your feet  · Go to your follow-up visits  Your care team provider will do a foot exam at least once a year  You may need a foot exam more often if you have nerve damage, foot deformities, or ulcers  He will check for nerve damage and how well you can feel your feet  He will check your shoes to see if they fit well  · Do not smoke  Smoking can damage your blood vessels and put you at increased risk for foot ulcers  Ask your care team provider for information if you currently smoke and need help to quit  E-cigarettes or smokeless tobacco still contain nicotine  Talk to your care team provider before you use these products  Follow up with your diabetes care team provider or foot specialist as directed: You will need to have your feet checked at least once a year  You may need a foot exam more often if you have nerve damage, foot deformities, or ulcers  Write down your questions so you remember to ask them during your visits  © Copyright "[employer] 2022 Information is for End User's use only and may not be sold, redistributed or otherwise used for commercial purposes  All illustrations and images included in CareNotes® are the copyrighted property of [employer]  or Andrea Shetty   The above information is an  only  It is not intended as medical advice for individual conditions or treatments   Talk to your doctor, nurse or pharmacist before following any medical regimen to see if it is safe and effective for you  What to Do if Your Blood Sugar is Low   AMBULATORY CARE:   Low blood sugar levels  (hypoglycemia) can happen with Type 1 and Type 2 diabetes  Low levels are more likely to happen if you use insulin  Hypoglycemia can cause you to have falls, accidents, and injuries  A blood sugar level that gets too low can lead to seizures, coma, and death  Learn to recognize the symptoms early so you can get treatment quickly  When your blood sugar is low you may feel:  · Sweaty    · Nervous or shaky    · Anxious or irritable    · Confused    · A fast, pounding heartbeat    · Extremely hungry    Have someone call your local emergency number (911 in the [address]) if:   · You cannot be woken  · You have a seizure  Call your doctor if:   · You have symptoms of a low blood sugar level, such as trouble thinking, sweating, or a pounding heartbeat  · Your blood sugar level is lower than normal and it does not improve with treatment  · You often have lower blood sugar levels than your target goals  · You have trouble coping with your illness, or you feel anxious or depressed  · You have questions or concerns about your condition or care  What to do if you have symptoms of low blood sugar:   · Check your blood sugar level, if possible  Your blood sugar level is too low if it is at or below 70 mg/dL  · Eat or drink 15 grams of fast-acting carbohydrate  Fast-acting carbohydrates will raise your blood sugar level quickly  Examples of 15 grams of fast-acting carbohydrates:     ? 4 ounces (½ cup) of fruit juice     ? 4 ounces of regular soda    ? 2 tablespoons of raisins     ? 1 tube of glucose gel or 3 to 4 glucose tablets       · Check your blood sugar level 15 minutes later  If the level is still low (less than 100 mg/dL), eat another 15 grams of carbohydrate  When the level returns to 100 mg/dL, eat a snack or meal that contains carbohydrates   This will help prevent another drop in blood sugar  · Teach people close to you how to use your glucagon kit  Your blood sugar may be too low for you to be awake  People need to know when and how to use your kit  Prevent low blood sugar levels:  Prevent low blood sugar by knowing what increases your risk  Ask your healthcare provider for ways to prevent low blood sugar levels  Any of the following can increase your risk of low blood sugar:  · Fasting for tests or procedures    · During or after intense exercise    · Late or postponed meals    · Sleeping (you may need a bedtime snack)     · Drinking alcohol if you use insulin or insulin releasing pills    Follow up with your doctor as directed:  Write down your questions so you remember to ask them during your visits  © Copyright [employer] 2022 Information is for End User's use only and may not be sold, redistributed or otherwise used for commercial purposes  All illustrations and images included in CareNotes® are the copyrighted property of [employer]  or [facility] Geeta Shetty   The above information is an  only  It is not intended as medical advice for individual conditions or treatments  Talk to your doctor, nurse or pharmacist before following any medical regimen to see if it is safe and effective for you

## 2022-06-13 NOTE — ASSESSMENT & PLAN NOTE
Cont meds and f/u with endocrinology    Lab Results   Component Value Date    HGBA1C 8 7 (A) 06/10/2022

## 2022-06-13 NOTE — ASSESSMENT & PLAN NOTE
Wt Readings from Last 3 Encounters:   06/10/22 113 kg (248 lb 9 6 oz)   12/02/21 110 kg (243 lb)   04/06/21 95 3 kg (210 lb)     Monitor weights  Low sodium diet

## 2022-06-17 DIAGNOSIS — F41.9 ANXIETY AND DEPRESSION: ICD-10-CM

## 2022-06-17 DIAGNOSIS — F32.A ANXIETY AND DEPRESSION: ICD-10-CM

## 2022-07-01 LAB
CREAT ?TM UR-SCNC: 195 UMOL/L
EXT MICROALBUMIN URINE RANDOM: 3.8
HBA1C MFR BLD HPLC: 9 %
MICROALBUMIN/CREAT UR: 19.5 MG/G{CREAT}

## 2022-07-18 DIAGNOSIS — F41.9 ANXIETY AND DEPRESSION: ICD-10-CM

## 2022-07-18 DIAGNOSIS — F32.A ANXIETY AND DEPRESSION: ICD-10-CM

## 2022-08-16 DIAGNOSIS — F41.9 ANXIETY AND DEPRESSION: ICD-10-CM

## 2022-08-16 DIAGNOSIS — F32.A ANXIETY AND DEPRESSION: ICD-10-CM

## 2022-09-22 PROBLEM — E55.9 VITAMIN D DEFICIENCY: Status: ACTIVE | Noted: 2022-09-22

## 2022-12-09 ENCOUNTER — OFFICE VISIT (OUTPATIENT)
Dept: FAMILY MEDICINE CLINIC | Facility: CLINIC | Age: 62
End: 2022-12-09

## 2022-12-09 VITALS
DIASTOLIC BLOOD PRESSURE: 70 MMHG | OXYGEN SATURATION: 96 % | HEART RATE: 76 BPM | HEIGHT: 63 IN | RESPIRATION RATE: 16 BRPM | TEMPERATURE: 97.9 F | SYSTOLIC BLOOD PRESSURE: 128 MMHG | WEIGHT: 244.2 LBS | BODY MASS INDEX: 43.27 KG/M2

## 2022-12-09 DIAGNOSIS — Z12.31 ENCOUNTER FOR SCREENING MAMMOGRAM FOR MALIGNANT NEOPLASM OF BREAST: ICD-10-CM

## 2022-12-09 DIAGNOSIS — E11.59 TYPE 2 DIABETES MELLITUS WITH OTHER CIRCULATORY COMPLICATION, WITH LONG-TERM CURRENT USE OF INSULIN (HCC): ICD-10-CM

## 2022-12-09 DIAGNOSIS — Z79.4 TYPE 2 DIABETES MELLITUS WITH OTHER CIRCULATORY COMPLICATION, WITH LONG-TERM CURRENT USE OF INSULIN (HCC): ICD-10-CM

## 2022-12-09 DIAGNOSIS — Z12.11 COLON CANCER SCREENING: ICD-10-CM

## 2022-12-09 DIAGNOSIS — F41.9 ANXIETY AND DEPRESSION: ICD-10-CM

## 2022-12-09 DIAGNOSIS — Z00.00 ANNUAL PHYSICAL EXAM: ICD-10-CM

## 2022-12-09 DIAGNOSIS — Z23 ENCOUNTER FOR IMMUNIZATION: Primary | ICD-10-CM

## 2022-12-09 DIAGNOSIS — F32.A ANXIETY AND DEPRESSION: ICD-10-CM

## 2022-12-09 RX ORDER — PEN NEEDLE, DIABETIC 32GX 5/32"
NEEDLE, DISPOSABLE MISCELLANEOUS
COMMUNITY
Start: 2022-09-28

## 2022-12-09 NOTE — PROGRESS NOTES
[address]    NAME: Tobias Wadsworth  AGE: 63 y o  SEX: female  : 1960     DATE: 2022     Assessment and Plan:     Problem List Items Addressed This Visit        Endocrine    Diabetes mellitus with circulatory complication ([facility] [address] )    Relevant Orders    HEMOGLOBIN A1C W/ EAG ESTIMATION       Other    Anxiety and depression    Relevant Medications    sertraline (Zoloft) 50 mg tablet   Other Visit Diagnoses     Encounter for immunization    -  Primary    Relevant Orders    TDAP VACCINE GREATER THAN OR EQUAL TO 8YO IM (Completed)    Pneumococcal Conjugate Vaccine 20-valent (Pcv20) (Completed)    Annual physical exam        Encounter for screening mammogram for malignant neoplasm of breast        Relevant Orders    Mammo screening bilateral w cad    Colon cancer screening        Relevant Orders    Cologuard          Immunizations and preventive care screenings were discussed with patient today  Appropriate education was printed on patient's after visit summary  Counseling:  Alcohol/drug use: discussed moderation in alcohol intake, the recommendations for healthy alcohol use, and avoidance of illicit drug use  Dental Health: discussed importance of regular tooth brushing, flossing, and dental visits  Injury prevention: discussed safety/seat belts, safety helmets, smoke detectors, carbon dioxide detectors, and smoking near bedding or upholstery  Sexual health: discussed sexually transmitted diseases, partner selection, use of condoms, avoidance of unintended pregnancy, and contraceptive alternatives  Exercise: the importance of regular exercise/physical activity was discussed  Recommend exercise 3-5 times per week for at least 30 minutes  Return in about 6 months (around 2023) for Recheck       Chief Complaint:     Chief Complaint   Patient presents with   • Physical Exam     Patient is being seen for an annual physical exam        History of Present Illness:     Adult Annual Physical   Patient here for a comprehensive physical exam  The patient reports problems - left leg wound returning, not healing, depression anxiety med not working as well,   Diet and Physical Activity  Diet/Nutrition: well balanced diet  Exercise: no formal exercise  Depression Screening  PHQ-2/9 Depression Screening         General Health  Sleep: sleeps well  Hearing: normal - bilateral   Vision: no vision problems  Dental: regular dental visits  /GYN Health  Patient is: postmenopausal        Review of Systems:     Review of Systems   Constitutional: Negative for fatigue and fever  HENT: Negative for congestion, postnasal drip and rhinorrhea  Eyes: Negative for photophobia and visual disturbance  Respiratory: Negative for cough  Cardiovascular: Negative for leg swelling  Gastrointestinal: Negative for constipation, diarrhea, nausea and vomiting  Genitourinary: Negative for dysuria and frequency  Musculoskeletal: Negative for arthralgias and myalgias  Skin: Positive for wound  Neurological: Negative for dizziness, light-headedness and headaches  Hematological: Negative for adenopathy  Psychiatric/Behavioral: Negative for dysphoric mood and sleep disturbance  The patient is not nervous/anxious         Past Medical History:     Past Medical History:   Diagnosis Date   • Atrial fibrillation ([facility] [address] )    • CHF (congestive heart failure) ([facility] [address] )    • Diabetes ([facility] [address] )    • [facility] fasciitis [facility]       Past Surgical History:     Past Surgical History:   Procedure Laterality Date   • CARDIAC ELECTROPHYSIOLOGY MAPPING AND ABLATION     • EXCESSIVE THIGH / HIP / BUTTOCK / FLANK SKIN EXCISION Right     necrotizing fascitis removal- right hip   • HYSTERECTOMY  1996      Social History:     Social History     Socioeconomic History   • Marital status:      Spouse name: None   • Number of children: None   • Years of education: None   • Highest education level: None   Occupational History   • None   Tobacco Use   • Smoking status: Former     Types: Cigarettes     Quit date: 2014     Years since quittin 9   • Smokeless tobacco: Never   Vaping Use   • Vaping Use: Never used   Substance and Sexual Activity   • Alcohol use: Not Currently     Comment: Rarely consumes    • Drug use: Never   • Sexual activity: Yes     Birth control/protection: Surgical   Other Topics Concern   • None   Social History Narrative   • None     Social Determinants of Health     Financial Resource Strain: Not on file   Food Insecurity: Not on file   Transportation Needs: Not on file   Physical Activity: Not on file   Stress: Not on file   Social Connections: Not on file   Intimate Partner Violence: Not on file   Housing Stability: Not on file      Family History:     Family History   Problem Relation Age of Onset   • Hypertension Mother    • Coronary artery disease Father    • Diabetes Father    • Hypertension Father       Current Medications:     Current Outpatient Medications   Medication Sig Dispense Refill   • acetaminophen (TYLENOL) 500 mg tablet Take 500 mg by mouth every 6 (six) hours as needed     • amiodarone 100 mg tablet Take 100 mg by mouth daily     • amLODIPine (NORVASC) 5 mg tablet Take 5 mg by mouth daily  5   • apixaban (ELIQUIS) 5 mg Take 5 mg by mouth 2 (two) times a day       • atorvastatin (LIPITOR) 80 mg tablet Take 80 mg by mouth     • B-D ULTRAFINE III SHORT PEN 31G X 8 MM MISC Inject under the skin 4 (four) times a day 200 each 5   • BD Pen Needle Stephani 2nd Gen 32G X 4 MM MISC USE 1 TO INJECT INSULIN 4 TIMES DAILY     • Blood Glucose Monitoring Suppl (ACCU-CHEK GUIDE ME) w/Device KIT Test once or twice daily, e11 9 1 kit 0   • Cholecalciferol 25 MCG (1000 UT) tablet Take 2,000 Units by mouth daily     • furosemide (LASIX) 40 mg tablet Take 40 mg by mouth     • glucose blood (ACCU-CHEK MONICA PLUS) test strip 1 each by Other route 2 (two) times a day Use as instructed 100 each 5   • glucose blood test strip Accu-chek Radha Plus meter  • insulin aspart (NovoLOG FlexPen) 100 UNIT/ML injection pen 15 Units 3 (three) times a day with meals     • insulin glargine (Lantus SoloStar) 100 units/mL injection pen Inject 30 Units under the skin daily (Patient taking differently: Inject 60 Units under the skin daily) 5 pen 3   • insulin lispro (HumaLOG KwikPen) 100 units/mL injection pen Inject 5 Units under the skin 3 (three) times a day with meals (Patient taking differently: Inject 15 Units under the skin 3 (three) times a day with meals) 5 pen 2   • Lancet Devices (ACCU-CHEK SOFTCLIX) lancets To be used B  I D 1 each 5   • Lancets (ACCU-CHEK SOFT TOUCH) lancets Test once or twice daily, e11 9 accu check guide 100 each 5   • levothyroxine 50 mcg tablet TAKE 1 TABLET BY MOUTH ONCE DAILY IN THE EARLY MORNING 90 tablet 0   • lisinopril (ZESTRIL) 20 mg tablet Take 20 mg by mouth     • metoprolol tartrate (LOPRESSOR) 50 mg tablet Take 25 mg by mouth 2 (two) times a day       • sertraline (Zoloft) 50 mg tablet Take 1 5 tablets (75 mg total) by mouth daily 45 tablet 5   • Trulicity 1 5 SI/0 7IK SOPN INJECT 1 SUBCUTANEOUSLY ONCE A WEEK       No current facility-administered medications for this visit  Allergies: Allergies   Allergen Reactions   • Skin Protectants, Misc  Dermatitis     Reports developing weeping dermatitis from topical use of Coloplast TRIAD Paste      Physical Exam:     /70 (BP Location: Left arm, Patient Position: Sitting, Cuff Size: Large)   Pulse 76   Temp 97 9 °F (36 6 °C) (Tympanic)   Resp 16   Ht 5' 3 47" (1 612 m)   Wt 111 kg (244 lb 3 2 oz)   LMP  (LMP Unknown)   SpO2 96%   BMI 42 63 kg/m²     Physical Exam  Vitals and nursing note reviewed  Constitutional:       Appearance: Normal appearance  She is obese  HENT:      Head: Normocephalic and atraumatic        Right Ear: Tympanic membrane, ear canal and external ear normal       Left Ear: Tympanic membrane, ear canal and external ear normal       Nose: Nose normal       Mouth/Throat:      Mouth: Mucous membranes are moist    Eyes:      Extraocular Movements: Extraocular movements intact  Conjunctiva/sclera: Conjunctivae normal       Pupils: Pupils are equal, round, and reactive to light  Neck:      Vascular: No carotid bruit  Cardiovascular:      Rate and Rhythm: Normal rate and regular rhythm  Pulses: Normal pulses  no weak pulses          Dorsalis pedis pulses are 2+ on the right side and 2+ on the left side  Heart sounds: Normal heart sounds  Pulmonary:      Effort: Pulmonary effort is normal       Breath sounds: Normal breath sounds  Abdominal:      General: Bowel sounds are normal       Palpations: Abdomen is soft  Musculoskeletal:         General: Normal range of motion  Cervical back: Normal range of motion and neck supple  Feet:      Right foot:      Skin integrity: No ulcer, skin breakdown, erythema, warmth, callus or dry skin  Left foot:      Skin integrity: No ulcer, skin breakdown, erythema, warmth, callus or dry skin  Skin:     General: Skin is warm  Capillary Refill: Capillary refill takes less than 2 seconds  Comments: Wound, 1cm x1cm open area     Neurological:      General: No focal deficit present  Mental Status: She is alert and oriented to person, place, and time  Psychiatric:         Mood and Affect: Mood normal          Behavior: Behavior normal          Thought Content: Thought content normal          Judgment: Judgment normal      Patient's shoes and socks removed  Right Foot/Ankle   Right Foot Inspection  Skin Exam: skin normal and skin intact  No dry skin, no warmth, no callus, no erythema, no maceration, no abnormal color, no pre-ulcer, no ulcer and no callus  Toe Exam: ROM and strength within normal limits       Sensory   Monofilament testing: intact    Vascular  Capillary refills: < 3 seconds  The right DP pulse is 2+  Left Foot/Ankle  Left Foot Inspection  Skin Exam: skin normal and skin intact  No dry skin, no warmth, no erythema, no maceration, normal color, no pre-ulcer, no ulcer and no callus  Toe Exam: ROM and strength within normal limits  Sensory   Monofilament testing: intact    Vascular  Capillary refills: < 3 seconds  The left DP pulse is 2+       Assign Risk Category  No deformity present  No loss of protective sensation  No weak pulses  Risk: 0      Nate Smart, [address] [facility], [address]

## 2022-12-09 NOTE — PATIENT INSTRUCTIONS
Wellness Visit for Adults   AMBULATORY CARE:   A wellness visit  is when you see your healthcare provider to get screened for health problems  Your healthcare provider will also give you advice on how to stay healthy  Write down your questions so you remember to ask them  Ask your healthcare provider how often you should have a wellness visit  What happens at a wellness visit:  Your healthcare provider will ask about your health, and your family history of health problems  This includes high blood pressure, heart disease, and cancer  He or she will ask if you have symptoms that concern you, if you smoke, and about your mood  You may also be asked about your intake of medicines, supplements, food, and alcohol  Any of the following may be done:  · Your weight  will be checked  Your height may also be checked so your body mass index (BMI) can be calculated  Your BMI shows if you are at a healthy weight  · Your blood pressure  and heart rate will be checked  Your temperature may also be checked  · Blood and urine tests  may be done  Blood tests may be done to check your cholesterol levels  Abnormal cholesterol levels increase your risk for heart disease and stroke  You may also need a blood or urine test to check for diabetes if you are at increased risk  Urine tests may be done to look for signs of an infection or kidney disease  · A physical exam  includes checking your heartbeat and lungs with a stethoscope  Your healthcare provider may also check your skin to look for sun damage  · Screening tests  may be recommended  A screening test is done to check for diseases that may not cause symptoms  The screening tests you may need depend on your age, gender, family history, and lifestyle habits  For example, colorectal screening may be recommended if you are 48years old or older  Screening tests you need if you are a woman:   · A Pap smear  is used to screen for cervical cancer   Pap smears are usually done every 3 to 5 years depending on your age  You may need them more often if you have had abnormal Pap smear test results in the past  Ask your healthcare provider how often you should have a Pap smear  · A mammogram  is an x-ray of your breasts to screen for breast cancer  Experts recommend mammograms every 2 years starting at age 48 years  You may need a mammogram at age 52 years or younger if you have an increased risk for breast cancer  Talk to your healthcare provider about when you should start having mammograms and how often you need them  Vaccines you may need:   · Get an influenza vaccine  every year  The influenza vaccine protects you from the flu  Several types of viruses cause the flu  The viruses change over time, so new vaccines are made each year  · Get a tetanus-diphtheria (Td) booster vaccine  every 10 years  This vaccine protects you against tetanus and diphtheria  Tetanus is a severe infection that may cause painful muscle spasms and lockjaw  Diphtheria is a severe bacterial infection that causes a thick covering in the back of your mouth and throat  · Get a human papillomavirus (HPV) vaccine  if you are female and aged 23 to 32 or male 23 to 24 and never received it  This vaccine protects you from HPV infection  HPV is the most common infection spread by sexual contact  HPV may also cause vaginal, penile, and anal cancers  · Get a pneumococcal vaccine  if you are aged 72 years or older  The pneumococcal vaccine is an injection given to protect you from pneumococcal disease  Pneumococcal disease is an infection caused by pneumococcal bacteria  The infection may cause pneumonia, meningitis, or an ear infection  · Get a shingles vaccine  if you are 60 or older, even if you have had shingles before  The shingles vaccine is an injection to protect you from the varicella-zoster virus  This is the same virus that causes chickenpox   Shingles is a painful rash that develops in people who had chickenpox or have been exposed to the virus  How to eat healthy:  My Plate is a model for planning healthy meals  It shows the types and amounts of foods that should go on your plate  Fruits and vegetables make up about half of your plate, and grains and protein make up the other half  A serving of dairy is included on the side of your plate  The amount of calories and serving sizes you need depends on your age, gender, weight, and height  Examples of healthy foods are listed below:  · Eat a variety of vegetables  such as dark green, red, and orange vegetables  You can also include canned vegetables low in sodium (salt) and frozen vegetables without added butter or sauces  · Eat a variety of fresh fruits , canned fruit in 100% juice, frozen fruit, and dried fruit  · Include whole grains  At least half of the grains you eat should be whole grains  Examples include whole-wheat bread, wheat pasta, brown rice, and whole-grain cereals such as oatmeal     · Eat a variety of protein foods such as seafood (fish and shellfish), lean meat, and poultry without skin (turkey and chicken)  Examples of lean meats include pork leg, shoulder, or tenderloin, and beef round, sirloin, tenderloin, and extra lean ground beef  Other protein foods include eggs and egg substitutes, beans, peas, soy products, nuts, and seeds  · Choose low-fat dairy products such as skim or 1% milk or low-fat yogurt, cheese, and cottage cheese  · Limit unhealthy fats  such as butter, hard margarine, and shortening  Exercise:  Exercise at least 30 minutes per day on most days of the week  Some examples of exercise include walking, biking, dancing, and swimming  You can also fit in more physical activity by taking the stairs instead of the elevator or parking farther away from stores  Include muscle strengthening activities 2 days each week  Regular exercise provides many health benefits   It helps you manage your weight, and decreases your risk for type 2 diabetes, heart disease, stroke, and high blood pressure  Exercise can also help improve your mood  Ask your healthcare provider about the best exercise plan for you  General health and safety guidelines:   · Do not smoke  Nicotine and other chemicals in cigarettes and cigars can cause lung damage  Ask your healthcare provider for information if you currently smoke and need help to quit  E-cigarettes or smokeless tobacco still contain nicotine  Talk to your healthcare provider before you use these products  · Limit alcohol  A drink of alcohol is 12 ounces of beer, 5 ounces of wine, or 1½ ounces of liquor  · Lose weight, if needed  Being overweight increases your risk of certain health conditions  These include heart disease, high blood pressure, type 2 diabetes, and certain types of cancer  · Protect your skin  Do not sunbathe or use tanning beds  Use sunscreen with a SPF 15 or higher  Apply sunscreen at least 15 minutes before you go outside  Reapply sunscreen every 2 hours  Wear protective clothing, hats, and sunglasses when you are outside  · Drive safely  Always wear your seatbelt  Make sure everyone in your car wears a seatbelt  A seatbelt can save your life if you are in an accident  Do not use your cell phone when you are driving  This could distract you and cause an accident  Pull over if you need to make a call or send a text message  · Practice safe sex  Use latex condoms if are sexually active and have more than one partner  Your healthcare provider may recommend screening tests for sexually transmitted infections (STIs)  · Wear helmets, lifejackets, and protective gear  Always wear a helmet when you ride a bike or motorcycle, go skiing, or play sports that could cause a head injury  Wear protective equipment when you play sports  Wear a lifejacket when you are on a boat or doing water sports      © Copyright [employer] 2022 Information is for End User's use only and may not be sold, redistributed or otherwise used for commercial purposes  All illustrations and images included in CareNotes® are the copyrighted property of [employer]  or Andrea Doshi  The above information is an  only  It is not intended as medical advice for individual conditions or treatments  Talk to your doctor, nurse or pharmacist before following any medical regimen to see if it is safe and effective for you  Type 2 Diabetes Management for Adults   AMBULATORY CARE:   Type 2 diabetes  is a disease that affects how your body uses glucose (sugar)  Either your body cannot make enough insulin, or it cannot use the insulin correctly  It is important to keep diabetes controlled to prevent damage to your heart, blood vessels, and other organs  Have someone call your local emergency number (911 in the [address] [facility]) if:   · You cannot be woken  · You have signs of diabetic ketoacidosis:     ? confusion, fatigue    ? vomiting    ? rapid heartbeat    ? fruity smelling breath    ? extreme thirst    ? dry mouth and skin    · You have any of the following signs of a heart attack:      ? Squeezing, pressure, or pain in your chest    ? You may  also have any of the following:     § Discomfort or pain in your back, neck, jaw, stomach, or arm    § Shortness of breath    § Nausea or vomiting    § Lightheadedness or a sudden cold sweat    · You have any of the following signs of a stroke:      ? Numbness or drooping on one side of your face     ? Weakness in an arm or leg    ? Confusion or difficulty speaking    ? Dizziness, a severe headache, or vision loss    Call your doctor or diabetes care team if:   · You have a sore or wound that will not heal     · You have a change in the amount you urinate  · Your blood sugar levels are higher than your target goals  · You often have lower blood sugar levels than your target goals      · Your skin is red, dry, warm, or swollen  · You have trouble coping with diabetes, or you feel anxious or depressed  · You have questions or concerns about your condition or care  What you need to know about high blood sugar levels:  High blood sugar levels may not cause any symptoms  You may feel more thirsty or urinate more often than usual  Over time, high blood sugar levels can damage your nerves, blood vessels, tissues, and organs  The following can increase your blood sugar levels:  · Large meals or large amounts of carbohydrates at one time    · Less physical activity    · Stress    · Illness    · A lower dose of medicine or insulin, or a late dose    What you need to know about low blood sugar levels: You can prevent symptoms such as shakiness, dizziness, irritability, or confusion by preventing your blood sugar levels from going too low  · Treat a low blood sugar level right away  ? Drink 4 ounces of juice or have 1 tube of glucose gel  ? Check your blood sugar level again 10 to 15 minutes later  ? When the level goes back to normal, eat a meal or snack to prevent another decrease  · Keep glucose gel, raisins, or hard candy with you at all times to treat a low blood sugar level  · Your blood sugar level can get too low if you take diabetes medicine or insulin and do not eat enough food  · If you use insulin, check your blood sugar level before you exercise  ? If your blood sugar level is below 100 mg/dL, eat 4 crackers or 2 ounces of raisins, or drink 4 ounces of juice  ? Check your level every 30 minutes if you exercise more than 1 hour  ? You may need a snack during or after exercise  What you can do to manage your blood sugar levels:   · Check your blood sugar levels as directed and as needed  Several items are available to use to check your levels  You may need to check by testing a drop of blood in a glucose monitor   You may instead be given a continuous glucose monitoring (CGM) device  The device is worn at all times  The CGM checks your blood sugar level every 5 minutes  It sends results to an electronic device such as a smart phone  A CGM can be used with or without an insulin pump  Talk with your provider to find out which method is best for you  The goal for blood sugar levels before meals  is between 80 and 130 mg/dL and 2 hours after eating  is lower than 180 mg/dL  · Make healthy food choices  Work with a dietitian to develop a meal plan that works for you and your schedule  A dietitian can help you learn how to eat the right amount of carbohydrates during your meals and snacks  Carbohydrates can raise your blood sugar level if you eat too many at one time  Examples of foods that contain carbohydrates are breads, cereals, rice, pasta, and sweets  · Get regular physical activity  Physical activity can help you get to your target blood sugar level goal and manage your weight  Get at least 150 minutes of moderate to vigorous aerobic physical activity each week  Do not miss more than 2 days in a row  Do not sit longer than 30 minutes at a time  Your healthcare provider can help you create an activity plan  The plan can include the best activities for you and can help you build your strength and endurance  · Maintain a healthy weight  Ask your healthcare provider what a healthy weight is for you  Ask him or her to help you create a safe weight loss plan if you are overweight  · Take your diabetes medicine or insulin as directed  You may need diabetes medicine, insulin, or both to help control your blood sugar levels  Your healthcare provider will teach you how and when to take your diabetes medicine or insulin  You will also be taught about side effects oral diabetes medicine can cause  Insulin may be injected, or given through a pump or pen  You and your care team will discuss which method is best for you  ?  An insulin pump  is an implanted device that gives your insulin 24 hours a day  An insulin pump prevents the need for multiple insulin injections in a day  ? An insulin pen  is a device prefilled with the right amount of insulin  ? You and your family members will be taught how to draw up and give insulin  if this is the best method for you  Your education team will also teach you how to dispose of needles and syringes  ? You will learn how much insulin you need  and when to give it  You will be taught when not to give insulin  You will also be taught what to do if your blood sugar level drops too low  This may happen if you take insulin and do not eat the right amount of carbohydrates  Other things you can do to manage type 2 diabetes:   · Wear medical alert identification  Wear medical alert jewelry or carry a card that says you have diabetes  Ask your provider where to get these items  · Do not smoke  Nicotine and other chemicals in cigarettes and cigars can cause lung and blood vessel damage  It also makes it more difficult to manage your diabetes  Ask your provider for information if you currently smoke and need help to quit  Do not use e-cigarettes or smokeless tobacco in place of cigarettes or to help you quit  They still contain nicotine  · Check your feet each day for cuts, scratches, calluses, or other wounds  Look for redness and swelling, and feel for warmth  Wear shoes that fit well  Check your shoes for rocks or other objects that can hurt your feet  Do not walk barefoot or wear shoes without socks  Wear cotton socks to help keep your feet dry  · Ask about vaccines you may need  You have a higher risk for serious illness if you get the flu, pneumonia, COVID-19, or hepatitis  Ask your provider if you should get vaccines to prevent these or other diseases, and when to get the vaccines  · Talk to your care team if you become stressed about diabetes care    Sometimes being able to fit diabetes care into your life can cause increased stress  The stress can cause you not to take care of yourself properly  Your care team can help by offering tips about self-care  Your care team may suggest you talk to a mental health provider  The provider can listen and offer help with self-care issues  Follow up with your doctor or diabetes care team as directed: You may need to have blood tests done before your follow-up visit  The test results will show if changes need to be made in your treatment or self-care  Write down your questions so you remember to ask them during your visits  Talk to your provider if you cannot afford your medicine  © Copyright [employer] 2022 Information is for End User's use only and may not be sold, redistributed or otherwise used for commercial purposes  All illustrations and images included in CareNotes® are the copyrighted property of [employer]  or Andrea Shetty   The above information is an  only  It is not intended as medical advice for individual conditions or treatments  Talk to your doctor, nurse or pharmacist before following any medical regimen to see if it is safe and effective for you  Type 2 Diabetes in Adults: New Diagnosis   AMBULATORY CARE:   Type 2 diabetes  is a disease that affects how your body uses glucose (sugar)  Normally, when the blood sugar level increases, the pancreas makes more insulin  Insulin helps move sugar out of the blood so it can be used for energy  Type 2 diabetes develops because either the body cannot make enough insulin, or it cannot use the insulin correctly  Type 2 diabetes can be controlled to prevent damage to your heart, blood vessels, and other organs         Common symptoms include the following:   · Numbness in your fingers or toes    · Blurred vision    · Frequent urination    · More hunger or thirst than usual    Have someone call your local emergency number (911 in the [address] [facility]) if:   · You have any of the following signs of a stroke:      ? Numbness or drooping on one side of your face     ? Weakness in an arm or leg    ? Confusion or difficulty speaking    ? Dizziness, a severe headache, or vision loss    · You have any of the following signs of a heart attack:      ? Squeezing, pressure, or pain in your chest    ? You may  also have any of the following:     § Discomfort or pain in your back, neck, jaw, stomach, or arm    § Shortness of breath    § Nausea or vomiting    § Lightheadedness or a sudden cold sweat    · You have trouble breathing  Seek care immediately if:   · You have severe abdominal pain  · You vomit for more than 2 hours  · You have trouble staying awake or focusing  · You are shaking or sweating  · You feel weak or more tired than usual     · You have blurred or double vision  · Your breath has a fruity, sweet smell  Call your doctor or diabetes care team if:   · Your arms and legs are swollen  · You have an upset stomach and cannot eat the foods on your meal plan  · You feel dizzy, have headaches, or are easily irritated  · Your skin is red, warm, dry, or swollen  · You have a wound that does not heal     · You have numbness in your arms or legs  · You have trouble coping with your illness, or you feel anxious or depressed  · You have questions or concerns about your condition or care  Treatment for type 2 diabetes  helps prevent or delay complications, including heart and kidney disease  You must eat healthy meals and do regular physical activity  You may  need any of the following:  · Diabetes medicines or insulin  may be given to decrease the amount of sugar in your blood  · Blood pressure medicine  may be given to lower your blood pressure  · Cholesterol-lowering medicine  may be given to prevent heart disease  · Antiplatelets , such as aspirin, help prevent blood clots  Take your antiplatelet medicine exactly as directed   These medicines make it more likely for you to bleed or bruise  If you are told to take aspirin, do not take acetaminophen or ibuprofen instead  · Take your medicine as directed  Contact your healthcare provider if you think your medicine is not helping or if you have side effects  Tell him or her if you are allergic to any medicine  Keep a list of the medicines, vitamins, and herbs you take  Include the amounts, and when and why you take them  Bring the list or the pill bottles to follow-up visits  Carry your medicine list with you in case of an emergency  Diabetes education:  Diabetes education will start right away  Diabetes education may also happen later to refresh your memory  Your diabetes care team may include physicians, nurse practitioners, and physician assistants  It may also include nurses, dietitians, exercise specialists, pharmacists, dentists, and podiatrists  Family members, or others who are close to you, may also be part of the team  You and your team will make goals and plans to manage diabetes and other health problems  The plans and goals will be specific to your needs  Members of your diabetes care team teach you the following:  · About nutrition:  A dietitian will help you make a meal plan to keep your blood sugar level steady  You will learn how food affects your blood sugar levels  You will also learn to keep track of sugar and starchy foods (carbohydrates)  Do not skip meals  Your blood sugar level may drop too low if you have taken diabetes medicine and do not eat  You may be taught to use the plate method for portion control  With the plate method, ½ of your plate contains vegetables  The other half is divided so that ¼ contains protein or meat, and ¼ contains starches, such as potatoes  · About physical activity and diabetes: You will learn why physical activity, such as walking, is important  You and your care team provider will make a plan for your activity   Your care team provider will tell you what a healthy weight is for you  He or she will help you make a plan to get to that weight and stay there  Maintain a healthy weight to help delay or prevent complications of diabetes  · About your blood sugar level: You will learn what your blood sugar level should be  You will be given information on when and how to check your blood sugar level  You may need to check by testing a drop of blood in a glucose monitor  You may instead be given a continuous glucose monitoring (CGM) device  A sensor is placed in your abdomen or on your arm  You put a transmitter on the sensor to get a reading that shows up on the monitor  You will learn what to do if your level is too high or too low  Write down the times of your checks and your levels  Take them to all follow-up appointments  · About diabetes medicine: You may need oral diabetes medicine, insulin, or both to help control your blood sugar levels  Your healthcare provider will teach you how and when to take oral diabetes medicine  You will also be taught about side effects oral diabetes medicine can cause  Insulin may be added if oral diabetes medicine becomes less effective over time  Insulin may be injected, or given through a pump or pen  You and your care team will discuss which method is best for you  ? An insulin pump  is an implanted device that gives your insulin 24 hours a day  An insulin pump prevents the need for multiple insulin injections in a day  ? An insulin pen  is a device prefilled with the right amount of insulin  ? You and your family members will be taught how to draw up and give insulin  if this is the best method for you  Your education team will also teach you how to dispose of needles and syringes  ? You will learn how much insulin you need  and when to give it  You will be taught when not to give insulin  You will also be taught what to do if your blood sugar level drops too low   This may happen if you take insulin and do not eat the right amount of carbohydrates  Other ways to help manage type 2 diabetes:   · Talk to your care team if you have increased stress about your diagnosis  When you feel stressed about your diagnosis, it can cause you not to take care of yourself properly  Your care team can help by offering tips about self-care  Your care team may suggest you talk to a mental health provider  The provider can listen and offer help with self-care issues  · Check your feet each day for sores  Wear shoes and socks that fit correctly  Do not trim your toenails  Go to a podiatrist  Ask your care team for more information about foot care  · Do not smoke  Nicotine and other chemicals in cigarettes and cigars can cause lung damage and make diabetes harder to manage  Ask your care team provider for information if you currently smoke and need help to quit  E-cigarettes or smokeless tobacco still contain nicotine  Talk to your care team provider before you use these products  · Drink water instead of sugary drinks such as soft drinks and fruit juices  Sugary drinks cause high blood sugar and cause an increase in your weight  Your healthcare provider may tell you that diet drinks do not help with weight loss  · Know the risks if you choose to drink alcohol  Alcohol can cause your blood sugar levels to be low if you use insulin  Alcohol can cause high blood sugar levels and weight gain if you drink too much  A drink of alcohol is 12 ounces of beer, 5 ounces of wine, or 1½ ounces of liquor  Your care team can tell you how many drinks are okay to have in 24 hours and in 1 week  · Check your blood pressure as directed  If you have high blood pressure (BP), talk to your care team about your BP goals  Together you can create a plan to lower your BP if needed and keep it in a healthy range  The plan may include lifestyle changes or medicines  A normal BP is 119/79 or lower   A normal blood pressure can help prevent or delay certain complications from diabetes  Examples include retinopathy (eye damage) and kidney damage  · Wear medical alert identification  Wear medical alert jewelry or carry a card that says you have diabetes  Ask your care team provider where to get these items  · Ask about vaccines you may need  You have a higher risk for serious illness if you get the flu, pneumonia, COVID-19, or hepatitis  Ask your provider if you should get a flu, pneumonia, or hepatitis B vaccine, and when to get the COVID-19 vaccine  Risks of type 2 diabetes: It is important to learn to keep your diabetes in control  Uncontrolled diabetes can damage your nerves, veins, and arteries  Your risk for dementia increases faster the longer your diabetes is not controlled  High blood sugar levels may damage other body tissues and organs over time  Damage to arteries may increase your risk for heart attack and stroke  Nerve damage may also lead to other heart, stomach, and nerve problems  Diabetes can become life-threatening if it is not controlled  Follow up with your care team providers as directed: You may need to return to have your A1c checked every 3 months  You will need to have your feet checked during at least 1 visit each year  You will need an eye exam 1 time each year to check for retinopathy  You will also need urine tests every year to check for kidney problems  You may need tests to monitor for heart disease, such as an EKG, stress test, blood pressure monitoring, and blood tests  Write down your questions so you remember to ask them during your visits  © [employer] 2022 Information is for End User's use only and may not be sold, redistributed or otherwise used for commercial purposes  All illustrations and images included in CareNotes® are the copyrighted property of [employer]  or Andrea Doshi  The above information is an  only   It is not intended as medical advice for individual conditions or treatments  Talk to your doctor, nurse or pharmacist before following any medical regimen to see if it is safe and effective for you  Basic Carbohydrate Counting   AMBULATORY CARE:   Carbohydrate counting  is a way to plan your meals by counting the amount of carbohydrate in foods  Carbohydrates are the sugars, starches, and fiber found in fruit, grains, vegetables, and milk products  Carbohydrates increase your blood sugar levels  Carbohydrate counting can help you eat the right amount of carbohydrate to keep your blood sugar levels under control  What you need to know about planning meals using carbohydrate counting:  · A dietitian or healthcare provider will help you develop a healthy meal plan that works best for you  You will be taught how much carbohydrate to eat or drink for each meal and snack  Your meal plan will be based on your age, weight, usual food intake, and physical activity level  If you have diabetes, it will also include your blood sugar levels and diabetes medicine  Once you know how much carbohydrate you should eat, you can decide what type of food you want to eat  · You will need to know what foods contain carbohydrate and how much they contain  Keep track of the amount of carbohydrate in meals and snacks in order to follow your meal plan  Do not avoid carbohydrates or skip meals  Your blood sugar may fall too low if you do not eat enough carbohydrate or you skip meals  Foods that contain carbohydrate:   · Breads:  Each serving of food listed below contains about 15 g of carbohydrate   ? 1 slice of bread (1 ounce) or 1 flour or corn tortilla (6 inch)    ? ½ of a hamburger bun or ¼ of a large bagel (about 1 ounce)    ? 1 pancake (about 4 inches across and ¼ inch thick)    · Cereals and grains:  Serving sizes of ready-to-eat cereals vary  Look at the serving size and the total carbohydrate amount listed on the food label  Each serving of food listed below contains about 15 g of carbohydrate   ? ¾ cup of dry, unsweetened, ready-to-eat cereal or ¼ cup of low-fat granola     ? ½ cup of oatmeal or other cooked cereal     ? ? cup of cooked rice or pasta    · Starchy vegetables and beans:  Each serving of food listed below contains about 15 g of carbohydrate   ? ½ cup of corn, green peas, sweet potatoes, or mashed potatoes    ? ¼ of a large baked potato    ? ½ cup of beans, lentils, and peas (garbanzo, leal, kidney, white, split, black-eyed)    · Crackers and snacks:  Each serving of food listed below contains about 15 g of carbohydrate   ? 3 karlos cracker squares or 8 animal crackers     ? 6 saltine-type crackers    ? 3 cups of popcorn or ¾ ounce of pretzels, potato chips, or tortilla chips    · Fruit:  Each serving of food listed below contains about 15 g of carbohydrate   ? 1 small (4 ounce) piece of fresh fruit or ¾ to 1 cup of fresh fruit    ? ½ cup of canned or frozen fruit, packed in natural juice    ? ½ cup (4 ounces) of unsweetened fruit juice    ? 2 tablespoons of dried fruit    · Desserts or sugary foods:  Each serving of food listed below contains about 15 g of carbohydrate   ? 2-inch square unfrosted cake or brownie     ? 2 small cookies    ? ½ cup of ice cream, frozen yogurt, or nondairy frozen yogurt    ? ¼ cup of sherbet or sorbet    ? 1 tablespoon of regular syrup, jam, or jelly    ? 2 tablespoons of light syrup    · Milk and yogurt:  Foods from the milk group contain about 12 g of carbohydrate per serving  ? 1 cup of fat-free or low-fat milk    ? 1 cup of soy milk    ? ? cup of fat-free, yogurt sweetened with artificial sweetener    · Non-starchy vegetables:  Each serving contains about 5 g of carbohydrate   Three servings of non-starch vegetables count as 1 carbohydrate serving  ? ½ cup of cooked vegetables or 1 cup of raw vegetables   This includes beets, broccoli, cabbage, cauliflower, cucumber, mushrooms, tomatoes, and zucchini    ? ½ cup of vegetable juice    How to use carbohydrate counting to plan meals:   · Count carbohydrate amounts using serving sizes:      ? Pasta dinner example: You plan to have pasta, tossed salad, and an 8-ounce glass of milk  Your healthcare provider tells you that you may have 4 carbohydrate servings for dinner  One carbohydrate serving of pasta is ? cup  One cup of pasta will equal 3 carbohydrate servings  An 8-ounce glass of milk will count as 1 carbohydrate serving  These amounts of food would equal 4 carbohydrate servings  One cup of tossed salad does not count toward your carbohydrate servings  · Count carbohydrate amounts using food labels:  Find the total amount of carbohydrate in a packaged food by reading the food label  Food labels tell you the serving size of the food and the total carbohydrate amount in each serving  Find the serving size on the food label and then decide how many servings you will eat  Multiply the number of servings you plan to eat by the carbohydrate amount per serving  ? Granola bar snack example: Your meal plan allows you to have 2 carbohydrate servings (30 grams) of carbohydrate for a snack  You plan to eat 1 package of granola bars, which contains 2 bars  According to the food label, the serving size of food in this package is 1 bar  Each serving (1 bar) contains 25 grams of carbohydrate  The total amount of carbohydrate in this package of granola bars would be 50 g  Based on your meal plan, you should eat only 1 bar  Follow up with your doctor as directed:  Write down your questions so you remember to ask them during your visits  © Copyright [employer] 2022 Information is for End User's use only and may not be sold, redistributed or otherwise used for commercial purposes   All illustrations and images included in CareNotes® are the copyrighted property of [employer]  or Andrea Shetty   The above information is an  only  It is not intended as medical advice for individual conditions or treatments  Talk to your doctor, nurse or pharmacist before following any medical regimen to see if it is safe and effective for you  Foot Care for People with Diabetes   AMBULATORY CARE:   What you need to know about foot care:   · Foot care helps protect your feet and prevent foot ulcers or sores  Long-term high blood sugar levels can damage the blood vessels and nerves in your legs and feet  This damage makes it hard to feel pressure, pain, temperature, and touch  You may not be able to feel a cut or sore, or shoes that are too tight  Foot care is needed to prevent serious problems, such as an infection or amputation  · Diabetes may cause your toes to become crooked or curved under  These changes may affect the way you walk and can lead to increased pressure on your foot  The pressure can decrease blood flow to your feet  Lack of blood flow increases your risk for a foot ulcer  Do not ignore small problems, such as dry skin or small wounds  These can become life-threatening over time without proper care  Call your care team provider if:   · Your feet become numb, weak, or hard to move  · You have pus draining from a sore on your foot  · You have a wound on your foot that gets bigger, deeper, or does not heal      · You see blisters, cuts, scratches, calluses, or sores on your foot  · You have a fever, and your feet become red, warm, and swollen  · Your toenails become thick, curled, or yellow  · You find it hard to check your feet because your vision is poor  · You have questions or concerns about your condition or care  How to care for your feet:   · Check your feet each day  Look at your whole foot, including the bottom, and between and under your toes  Check for wounds, corns, and calluses  Use a mirror to see the bottom of your feet   The skin on your feet may be shiny, tight, or darker than normal  Your feet may also be cold and pale  Feel your feet by running your hands along the tops, bottoms, sides, and between your toes  Redness, swelling, and warmth are signs of blood flow problems that can lead to a foot ulcer  Do not try to remove corns or calluses yourself  · Wash your feet each day with soap and warm water  Do not use hot water, because this can injure your foot  Dry your feet gently with a towel after you wash them  Dry between and under your toes  · Apply lotion or a moisturizer on your dry feet  Ask your care team provider what lotions are best to use  Do not put lotion or moisturizer between your toes  Moisture between your toes could lead to skin breakdown  · Cut your toenails correctly  File or cut your toenails straight across  Use a soft brush to clean around your toenails  If your toenails are very thick, you may need to have a care team provider or specialist cut them  · Protect your feet  Do not walk barefoot or wear your shoes without socks  Check your shoes for rocks or other objects that can hurt your feet  Wear cotton socks to help keep your feet dry  Wear socks without toe seams, or wear them with the seams inside out  Change your socks each day  Do not wear socks that are dirty or damp  · Wear shoes that fit well  Wear shoes that do not rub against any area of your feet  Your shoes should be ½ to ¾ inch (1 to 2 centimeters) longer than your feet  Your shoes should also have extra space around the widest part of your feet  Walking or athletic shoes with laces or straps that adjust are best  Ask your care team provider for help to choose shoes that fit you best  Ask him or her if you need to wear an insert, orthotic, or bandage on your feet  · Go to your follow-up visits  Your care team provider will do a foot exam at least once a year  You may need a foot exam more often if you have nerve damage, foot deformities, or ulcers   He will check for nerve damage and how well you can feel your feet  He will check your shoes to see if they fit well  · Do not smoke  Smoking can damage your blood vessels and put you at increased risk for foot ulcers  Ask your care team provider for information if you currently smoke and need help to quit  E-cigarettes or smokeless tobacco still contain nicotine  Talk to your care team provider before you use these products  Follow up with your diabetes care team provider or foot specialist as directed: You will need to have your feet checked at least once a year  You may need a foot exam more often if you have nerve damage, foot deformities, or ulcers  Write down your questions so you remember to ask them during your visits  © [employer] 2022 Information is for End User's use only and may not be sold, redistributed or otherwise used for commercial purposes  All illustrations and images included in CareNotes® are the copyrighted property of A [employer] A M [employer] Inc  or [employer]   The above information is an  only  It is not intended as medical advice for individual conditions or treatments  Talk to your doctor, nurse or pharmacist before following any medical regimen to see if it is safe and effective for you  What to Do if Your Blood Sugar is Low   AMBULATORY CARE:   Low blood sugar levels  (hypoglycemia) can happen with Type 1 and Type 2 diabetes  Low levels are more likely to happen if you use insulin  Hypoglycemia can cause you to have falls, accidents, and injuries  A blood sugar level that gets too low can lead to seizures, coma, and death  Learn to recognize the symptoms early so you can get treatment quickly  When your blood sugar is low you may feel:  · Sweaty    · Nervous or shaky    · Anxious or irritable    · Confused    · A fast, pounding heartbeat    · Extremely hungry    Have someone call your local emergency number (911 in the [address]) if:   · You cannot be woken      · You have a seizure  Call your doctor if:   · You have symptoms of a low blood sugar level, such as trouble thinking, sweating, or a pounding heartbeat  · Your blood sugar level is lower than normal and it does not improve with treatment  · You often have lower blood sugar levels than your target goals  · You have trouble coping with your illness, or you feel anxious or depressed  · You have questions or concerns about your condition or care  What to do if you have symptoms of low blood sugar:   · Check your blood sugar level, if possible  Your blood sugar level is too low if it is at or below 70 mg/dL  · Eat or drink 15 grams of fast-acting carbohydrate  Fast-acting carbohydrates will raise your blood sugar level quickly  Examples of 15 grams of fast-acting carbohydrates:     ? 4 ounces (½ cup) of fruit juice     ? 4 ounces of regular soda    ? 2 tablespoons of raisins     ? 1 tube of glucose gel or 3 to 4 glucose tablets       · Check your blood sugar level 15 minutes later  If the level is still low (less than 100 mg/dL), eat another 15 grams of carbohydrate  When the level returns to 100 mg/dL, eat a snack or meal that contains carbohydrates  This will help prevent another drop in blood sugar  · Teach people close to you how to use your glucagon kit  Your blood sugar may be too low for you to be awake  People need to know when and how to use your kit  Prevent low blood sugar levels:  Prevent low blood sugar by knowing what increases your risk  Ask your healthcare provider for ways to prevent low blood sugar levels   Any of the following can increase your risk of low blood sugar:  · Fasting for tests or procedures    · During or after intense exercise    · Late or postponed meals    · Sleeping (you may need a bedtime snack)     · Drinking alcohol if you use insulin or insulin releasing pills    Follow up with your doctor as directed:  Write down your questions so you remember to ask them during your visits  © Copyright [employer] 2022 Information is for End User's use only and may not be sold, redistributed or otherwise used for commercial purposes  All illustrations and images included in CareNotes® are the copyrighted property of A [employer] M [employer] Inc  or Andrea Doshi  The above information is an  only  It is not intended as medical advice for individual conditions or treatments  Talk to your doctor, nurse or pharmacist before following any medical regimen to see if it is safe and effective for you  Type 2 Diabetes Management for Adults   AMBULATORY CARE:   Type 2 diabetes  is a disease that affects how your body uses glucose (sugar)  Either your body cannot make enough insulin, or it cannot use the insulin correctly  It is important to keep diabetes controlled to prevent damage to your heart, blood vessels, and other organs  Have someone call your local emergency number (911 in the [address]) if:   · You cannot be woken  · You have signs of diabetic ketoacidosis:     ? confusion, fatigue    ? vomiting    ? rapid heartbeat    ? fruity smelling breath    ? extreme thirst    ? dry mouth and skin    · You have any of the following signs of a heart attack:      ? Squeezing, pressure, or pain in your chest    ? You may  also have any of the following:     § Discomfort or pain in your back, neck, jaw, stomach, or arm    § Shortness of breath    § Nausea or vomiting    § Lightheadedness or a sudden cold sweat    · You have any of the following signs of a stroke:      ? Numbness or drooping on one side of your face     ? Weakness in an arm or leg    ? Confusion or difficulty speaking    ? Dizziness, a severe headache, or vision loss    Call your doctor or diabetes care team if:   · You have a sore or wound that will not heal     · You have a change in the amount you urinate  · Your blood sugar levels are higher than your target goals      · You often have lower blood sugar levels than your target goals  · Your skin is red, dry, warm, or swollen  · You have trouble coping with diabetes, or you feel anxious or depressed  · You have questions or concerns about your condition or care  What you need to know about high blood sugar levels:  High blood sugar levels may not cause any symptoms  You may feel more thirsty or urinate more often than usual  Over time, high blood sugar levels can damage your nerves, blood vessels, tissues, and organs  The following can increase your blood sugar levels:  · Large meals or large amounts of carbohydrates at one time    · Less physical activity    · Stress    · Illness    · A lower dose of medicine or insulin, or a late dose    What you need to know about low blood sugar levels: You can prevent symptoms such as shakiness, dizziness, irritability, or confusion by preventing your blood sugar levels from going too low  · Treat a low blood sugar level right away  ? Drink 4 ounces of juice or have 1 tube of glucose gel  ? Check your blood sugar level again 10 to 15 minutes later  ? When the level goes back to normal, eat a meal or snack to prevent another decrease  · Keep glucose gel, raisins, or hard candy with you at all times to treat a low blood sugar level  · Your blood sugar level can get too low if you take diabetes medicine or insulin and do not eat enough food  · If you use insulin, check your blood sugar level before you exercise  ? If your blood sugar level is below 100 mg/dL, eat 4 crackers or 2 ounces of raisins, or drink 4 ounces of juice  ? Check your level every 30 minutes if you exercise more than 1 hour  ? You may need a snack during or after exercise  What you can do to manage your blood sugar levels:   · Check your blood sugar levels as directed and as needed  Several items are available to use to check your levels  You may need to check by testing a drop of blood in a glucose monitor   You may instead be given a continuous glucose monitoring (CGM) device  The device is worn at all times  The CGM checks your blood sugar level every 5 minutes  It sends results to an electronic device such as a smart phone  A CGM can be used with or without an insulin pump  Talk with your provider to find out which method is best for you  The goal for blood sugar levels before meals  is between 80 and 130 mg/dL and 2 hours after eating  is lower than 180 mg/dL  · Make healthy food choices  Work with a dietitian to develop a meal plan that works for you and your schedule  A dietitian can help you learn how to eat the right amount of carbohydrates during your meals and snacks  Carbohydrates can raise your blood sugar level if you eat too many at one time  Examples of foods that contain carbohydrates are breads, cereals, rice, pasta, and sweets  · Get regular physical activity  Physical activity can help you get to your target blood sugar level goal and manage your weight  Get at least 150 minutes of moderate to vigorous aerobic physical activity each week  Do not miss more than 2 days in a row  Do not sit longer than 30 minutes at a time  Your healthcare provider can help you create an activity plan  The plan can include the best activities for you and can help you build your strength and endurance  · Maintain a healthy weight  Ask your healthcare provider what a healthy weight is for you  Ask him or her to help you create a safe weight loss plan if you are overweight  · Take your diabetes medicine or insulin as directed  You may need diabetes medicine, insulin, or both to help control your blood sugar levels  Your healthcare provider will teach you how and when to take your diabetes medicine or insulin  You will also be taught about side effects oral diabetes medicine can cause  Insulin may be injected, or given through a pump or pen   You and your care team will discuss which method is best for you  ? An insulin pump  is an implanted device that gives your insulin 24 hours a day  An insulin pump prevents the need for multiple insulin injections in a day  ? An insulin pen  is a device prefilled with the right amount of insulin  ? You and your family members will be taught how to draw up and give insulin  if this is the best method for you  Your education team will also teach you how to dispose of needles and syringes  ? You will learn how much insulin you need  and when to give it  You will be taught when not to give insulin  You will also be taught what to do if your blood sugar level drops too low  This may happen if you take insulin and do not eat the right amount of carbohydrates  Other things you can do to manage type 2 diabetes:   · Wear medical alert identification  Wear medical alert jewelry or carry a card that says you have diabetes  Ask your provider where to get these items  · Do not smoke  Nicotine and other chemicals in cigarettes and cigars can cause lung and blood vessel damage  It also makes it more difficult to manage your diabetes  Ask your provider for information if you currently smoke and need help to quit  Do not use e-cigarettes or smokeless tobacco in place of cigarettes or to help you quit  They still contain nicotine  · Check your feet each day for cuts, scratches, calluses, or other wounds  Look for redness and swelling, and feel for warmth  Wear shoes that fit well  Check your shoes for rocks or other objects that can hurt your feet  Do not walk barefoot or wear shoes without socks  Wear cotton socks to help keep your feet dry  · Ask about vaccines you may need  You have a higher risk for serious illness if you get the flu, pneumonia, COVID-19, or hepatitis  Ask your provider if you should get vaccines to prevent these or other diseases, and when to get the vaccines      · Talk to your care team if you become stressed about diabetes care  Sometimes being able to fit diabetes care into your life can cause increased stress  The stress can cause you not to take care of yourself properly  Your care team can help by offering tips about self-care  Your care team may suggest you talk to a mental health provider  The provider can listen and offer help with self-care issues  Follow up with your doctor or diabetes care team as directed: You may need to have blood tests done before your follow-up visit  The test results will show if changes need to be made in your treatment or self-care  Write down your questions so you remember to ask them during your visits  Talk to your provider if you cannot afford your medicine  © Copyright [employer] 2022 Information is for End User's use only and may not be sold, redistributed or otherwise used for commercial purposes  All illustrations and images included in CareNotes® are the copyrighted property of A D A M [employer] Inc  or Andrea Shetty   The above information is an  only  It is not intended as medical advice for individual conditions or treatments  Talk to your doctor, nurse or pharmacist before following any medical regimen to see if it is safe and effective for you

## 2022-12-12 ENCOUNTER — TELEPHONE (OUTPATIENT)
Dept: FAMILY MEDICINE CLINIC | Facility: CLINIC | Age: 62
End: 2022-12-12

## 2022-12-12 NOTE — TELEPHONE ENCOUNTER
Patient is following up on her visit on 12/9, she claims you were supposed to clear with her insurance regarding her wound on left calf area? ?    Please advise    Thanks

## 2022-12-13 NOTE — TELEPHONE ENCOUNTER
Patient called back and now needs the paperwork faxed to [facility] Attn: Member Services [phone]    Thank You

## 2022-12-19 ENCOUNTER — TELEPHONE (OUTPATIENT)
Dept: FAMILY MEDICINE CLINIC | Facility: CLINIC | Age: 62
End: 2022-12-19

## 2022-12-19 NOTE — TELEPHONE ENCOUNTER
Patient called and stated that she spoke with her insurance company about all of the forms that you have been filling out for her for the in home nurse does not need to be done  The insurance company told her that the easiest way to do it is, if you put in a new order for in home nurse for 2 times a week for 8 weeks  Can you put in a new order for the patient? Please advise  Patient made aware you are out of the office until Wednesday

## 2022-12-26 DIAGNOSIS — E03.9 ACQUIRED HYPOTHYROIDISM: ICD-10-CM

## 2022-12-26 RX ORDER — LEVOTHYROXINE SODIUM 0.05 MG/1
TABLET ORAL
Qty: 90 TABLET | Refills: 0 | Status: SHIPPED | OUTPATIENT
Start: 2022-12-26

## 2023-02-28 ENCOUNTER — VBI (OUTPATIENT)
Dept: ADMINISTRATIVE | Facility: OTHER | Age: 63
End: 2023-02-28

## 2023-04-03 DIAGNOSIS — E03.9 ACQUIRED HYPOTHYROIDISM: ICD-10-CM

## 2023-04-04 RX ORDER — LEVOTHYROXINE SODIUM 0.05 MG/1
TABLET ORAL
Qty: 90 TABLET | Refills: 3 | Status: SHIPPED | OUTPATIENT
Start: 2023-04-04

## 2023-05-01 ENCOUNTER — OFFICE VISIT (OUTPATIENT)
Dept: FAMILY MEDICINE CLINIC | Facility: CLINIC | Age: 63
End: 2023-05-01

## 2023-05-01 VITALS
BODY MASS INDEX: 43.34 KG/M2 | RESPIRATION RATE: 18 BRPM | HEIGHT: 63 IN | TEMPERATURE: 98.5 F | SYSTOLIC BLOOD PRESSURE: 138 MMHG | WEIGHT: 244.6 LBS | DIASTOLIC BLOOD PRESSURE: 82 MMHG | OXYGEN SATURATION: 96 % | HEART RATE: 66 BPM

## 2023-05-01 DIAGNOSIS — E03.9 ACQUIRED HYPOTHYROIDISM: ICD-10-CM

## 2023-05-01 DIAGNOSIS — I10 ESSENTIAL HYPERTENSION: ICD-10-CM

## 2023-05-01 DIAGNOSIS — E11.59 TYPE 2 DIABETES MELLITUS WITH OTHER CIRCULATORY COMPLICATION, WITH LONG-TERM CURRENT USE OF INSULIN (HCC): Primary | ICD-10-CM

## 2023-05-01 DIAGNOSIS — F32.A ANXIETY AND DEPRESSION: ICD-10-CM

## 2023-05-01 DIAGNOSIS — E78.5 DYSLIPIDEMIA: ICD-10-CM

## 2023-05-01 DIAGNOSIS — Z79.4 TYPE 2 DIABETES MELLITUS WITH OTHER CIRCULATORY COMPLICATION, WITH LONG-TERM CURRENT USE OF INSULIN (HCC): Primary | ICD-10-CM

## 2023-05-01 DIAGNOSIS — E66.01 CLASS 3 SEVERE OBESITY DUE TO EXCESS CALORIES WITH SERIOUS COMORBIDITY AND BODY MASS INDEX (BMI) OF 40.0 TO 44.9 IN ADULT (HCC): ICD-10-CM

## 2023-05-01 DIAGNOSIS — F41.9 ANXIETY AND DEPRESSION: ICD-10-CM

## 2023-05-01 LAB
CREAT UR-MCNC: 91.1 MG/DL
MICROALBUMIN UR-MCNC: 53 MG/L (ref 0–20)
MICROALBUMIN/CREAT 24H UR: 58 MG/G CREATININE (ref 0–30)
SL AMB POCT HEMOGLOBIN AIC: 9 (ref ?–6.5)

## 2023-05-01 NOTE — PROGRESS NOTES
FAMILY PRACTICE OFFICE VISIT       NAME: Yanique Grissom  AGE: 63 y o  SEX: female       : 1960        MRN: [id number]    DATE: 2023  TIME: 12:32 AM    Assessment and Plan   1  Type 2 diabetes mellitus with other circulatory complication, with long-term current use of insulin (HCC)  Assessment & Plan:    Lab Results   Component Value Date    HGBA1C 9 0 (A) 2023     Uncontrolled  Follow up with endocrinology      Orders:  -     POCT hemoglobin A1c  -     Microalbumin / creatinine urine ratio; Future; Expected date: 2023  -     Comprehensive metabolic panel; Future  -     CBC and differential; Future  -     Lipid Panel with Direct LDL reflex; Future  -     TSH, 3rd generation with Free T4 reflex; Future  -     Microalbumin / creatinine urine ratio  -     Comprehensive metabolic panel; Future; Expected date: 2023  -     CBC and differential; Future; Expected date: 2023  -     Lipid Panel with Direct LDL reflex; Future; Expected date: 2023  -     TSH, 3rd generation with Free T4 reflex; Future; Expected date: 2023    2  Acquired hypothyroidism  Assessment & Plan:  Labs  Cont meds      Orders:  -     Comprehensive metabolic panel; Future  -     CBC and differential; Future  -     Lipid Panel with Direct LDL reflex; Future  -     TSH, 3rd generation with Free T4 reflex; Future  -     Comprehensive metabolic panel; Future; Expected date: 2023  -     CBC and differential; Future; Expected date: 2023  -     Lipid Panel with Direct LDL reflex; Future; Expected date: 2023  -     TSH, 3rd generation with Free T4 reflex; Future; Expected date: 2023    3  Dyslipidemia  Assessment & Plan:  Cont current meds  Labs      Orders:  -     Comprehensive metabolic panel; Future  -     CBC and differential; Future  -     Lipid Panel with Direct LDL reflex; Future  -     TSH, 3rd generation with Free T4 reflex; Future  -     Comprehensive metabolic panel;  Future; Expected date: 05/02/2023  -     CBC and differential; Future; Expected date: 05/02/2023  -     Lipid Panel with Direct LDL reflex; Future; Expected date: 05/02/2023  -     TSH, 3rd generation with Free T4 reflex; Future; Expected date: 05/02/2023    4  Essential hypertension  Assessment & Plan:  Stable  Cont meds      Orders:  -     Comprehensive metabolic panel; Future  -     CBC and differential; Future  -     Lipid Panel with Direct LDL reflex; Future  -     TSH, 3rd generation with Free T4 reflex; Future  -     Comprehensive metabolic panel; Future; Expected date: 05/02/2023  -     CBC and differential; Future; Expected date: 05/02/2023  -     Lipid Panel with Direct LDL reflex; Future; Expected date: 05/02/2023  -     TSH, 3rd generation with Free T4 reflex; Future; Expected date: 05/02/2023    5  Class 3 severe obesity due to excess calories with serious comorbidity and body mass index (BMI) of 40 0 to 44 9 in adult [facility])  Assessment & Plan:  Follow diabetic diet      Orders:  -     Comprehensive metabolic panel; Future; Expected date: 05/02/2023  -     CBC and differential; Future; Expected date: 05/02/2023  -     Lipid Panel with Direct LDL reflex; Future; Expected date: 05/02/2023  -     TSH, 3rd generation with Free T4 reflex; Future; Expected date: 05/02/2023    6  Anxiety and depression  Assessment & Plan:  Stable  Cont meds      Orders:  -     Comprehensive metabolic panel; Future; Expected date: 05/02/2023  -     CBC and differential; Future; Expected date: 05/02/2023  -     Lipid Panel with Direct LDL reflex; Future; Expected date: 05/02/2023  -     TSH, 3rd generation with Free T4 reflex;  Future; Expected date: 05/02/2023               Chief Complaint     Chief Complaint   Patient presents with    Follow-up     Patient here for 4 month follow up    Hypertension    Hypothyroidism    Diabetes    Depression    Anxiety       History of Present Illness   Aggie Abrams is a 63y o -year-old female who is hre for f/u to chronic medical conditions  Left leg healed up, started in 2019 and just healed these past two weeks  Feeling well  Continues to follow with cardiology and endocrinology      Review of Systems   Review of Systems   Constitutional: Negative for fatigue and fever  HENT: Negative for congestion, postnasal drip and rhinorrhea  Eyes: Negative for photophobia and visual disturbance  Respiratory: Negative for cough, shortness of breath and wheezing  Cardiovascular: Negative for chest pain and palpitations  Gastrointestinal: Negative for constipation, diarrhea, nausea and vomiting  Genitourinary: Negative for dysuria and frequency  Musculoskeletal: Negative for arthralgias and myalgias  Skin: Negative for rash  Neurological: Negative for dizziness, light-headedness and headaches  Hematological: Negative for adenopathy  Psychiatric/Behavioral: Negative for dysphoric mood and sleep disturbance  The patient is not nervous/anxious          Active Problem List     Patient Active Problem List   Diagnosis    Chronic anticoagulation    Chronic diastolic heart failure (HCC)    Diabetes mellitus with circulatory complication (HCC)    Essential hypertension    Mild obstructive sleep apnea    On amiodarone therapy    Vibration sensory loss    Class 3 severe obesity due to excess calories with serious comorbidity and body mass index (BMI) of 40 0 to 44 9 in adult ([facility] )    Dyslipidemia    High risk medication use    History of atrial fibrillation    Anxiety and depression    Nephrolithiasis    Acquired hypothyroidism    [facility] fl[facility] ([facility]    S/P placement of cardiac pacemaker    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 ([facility] )    Vitamin D deficiency         Past Medical History:  Past Medical History:   Diagnosis Date    Atrial fibrillation ([facility] )     CHF (congestive heart failure) ([facility] )     Diabetes ([facility] )     Necrotizing fasciitis ([facility] )        Past Surgical History:  Past Surgical History:   Procedure Laterality Date    CARDIAC ELECTROPHYSIOLOGY MAPPING AND ABLATION      EXCESSIVE THIGH / Vernadine Crome / BUTTOCK / FLANK SKIN EXCISION Right     necrotizing fascitis removal- right hip    HYSTERECTOMY         Family History:  Family History   Problem Relation Age of Onset    Hypertension Mother     Coronary artery disease Father    Rola Roca Diabetes Father     Hypertension Father        Social History:  Social History     Socioeconomic History    Marital status:      Spouse name: Not on file    Number of children: Not on file    Years of education: Not on file    Highest education level: Not on file   Occupational History    Not on file   Tobacco Use    Smoking status: Former     Types: Cigarettes     Quit date: 2014     Years since quittin 3    Smokeless tobacco: Never   Vaping Use    Vaping Use: Never used   Substance and Sexual Activity    Alcohol use: Not Currently     Comment: Rarely consumes     Drug use: Never    Sexual activity: Yes     Birth control/protection: Surgical   Other Topics Concern    Not on file   Social History Narrative    Not on file     Social Determinants of Health     Financial Resource Strain: Not on file   Food Insecurity: Not on file   Transportation Needs: Not on file   Physical Activity: Not on file   Stress: Not on file   Social Connections: Not on file   Intimate Partner Violence: Not on file   Housing Stability: Not on file       Objective     Vitals:    23 1250   BP: 138/82   Pulse: 66   Resp: 18   Temp: 98 5 °F (36 9 °C)   SpO2: 96%     Wt Readings from Last 3 Encounters:   23 111 kg (244 lb 9 6 oz)   22 111 kg (244 lb 3 2 oz)   06/10/22 113 kg (248 lb 9 6 oz)       Physical Exam  Vitals and nursing note reviewed  Constitutional:       Appearance: Normal appearance  HENT:      Head: Normocephalic and atraumatic        Right Ear: Tympanic membrane, ear canal and external ear normal       Left Ear: Tympanic membrane, ear canal and external ear normal       Nose: Nose normal       Mouth/Throat:      Mouth: Mucous membranes are moist    Eyes:      Conjunctiva/sclera: Conjunctivae normal    Cardiovascular:      Rate and Rhythm: Normal rate and regular rhythm  Heart sounds: Normal heart sounds  Pulmonary:      Effort: Pulmonary effort is normal       Breath sounds: Normal breath sounds  Abdominal:      General: Bowel sounds are normal    Musculoskeletal:         General: Normal range of motion  Cervical back: Normal range of motion and neck supple  Skin:     General: Skin is warm and dry  Capillary Refill: Capillary refill takes less than 2 seconds  Neurological:      General: No focal deficit present  Mental Status: She is alert and oriented to person, place, and time  Psychiatric:         Mood and Affect: Mood normal          Behavior: Behavior normal          Thought Content: Thought content normal          Judgment: Judgment normal          Pertinent Laboratory/Diagnostic Studies:  No results found for: GLUCOSE, BUN, CREATININE, CALCIUM, NA, K, CO2, CL  No results found for: ALT, AST, GGT, ALKPHOS, BILITOT    No results found for: WBC, HGB, HCT, MCV, PLT    No results found for: TSH    No results found for: CHOL  No results found for: TRIG  No results found for: HDL  No results found for: [facility]  Lab Results   Component Value Date    HGBA1C 9 0 (A) 05/01/2023       Results for orders placed or performed in visit on 05/01/23   Microalbumin / creatinine urine ratio   Result Value Ref Range    Creatinine, Ur 91 1 mg/dL    Microalbum  ,U,Random 53 0 (H) 0 0 - 20 0 mg/L    Microalb Creat Ratio 58 (H) 0 - 30 mg/g creatinine   POCT hemoglobin A1c   Result Value Ref Range    Hemoglobin A1C 9 0 (A) 6 5       Orders Placed This Encounter   Procedures    Microalbumin / creatinine urine ratio    Comprehensive metabolic panel    CBC and differential    Lipid Panel with Direct LDL reflex    TSH, 3rd generation with Free T4 reflex    Comprehensive metabolic panel    CBC and differential    Lipid Panel with Direct LDL reflex    TSH, 3rd generation with Free T4 reflex    POCT hemoglobin A1c       ALLERGIES:  Allergies   Allergen Reactions    Skin Protectants, Misc  Dermatitis     Reports developing weeping dermatitis from topical use of Coloplast TRIAD Paste       Current Medications     Current Outpatient Medications   Medication Sig Dispense Refill    acetaminophen (TYLENOL) 500 mg tablet Take 500 mg by mouth every 6 (six) hours as needed      amiodarone 100 mg tablet Take 100 mg by mouth daily      amLODIPine (NORVASC) 5 mg tablet Take 5 mg by mouth daily  5    apixaban (ELIQUIS) 5 mg Take 5 mg by mouth 2 (two) times a day        atorvastatin (LIPITOR) 80 mg tablet Take 80 mg by mouth      B-D ULTRAFINE III SHORT PEN 31G X 8 MM MISC Inject under the skin 4 (four) times a day 200 each 5    BD Pen Needle Stephani 2nd Gen 32G X 4 MM MISC USE 1 TO INJECT INSULIN 4 TIMES DAILY      Blood Glucose Monitoring Suppl (ACCU-CHEK GUIDE ME) w/Device KIT Test once or twice daily, e11 9 1 kit 0    Cholecalciferol 25 MCG (1000 UT) tablet Take 2,000 Units by mouth daily      CRANBERRY PO Take by mouth      furosemide (LASIX) 40 mg tablet Take 40 mg by mouth      glucose blood (ACCU-CHEK MONICA PLUS) test strip 1 each by Other route 2 (two) times a day Use as instructed 100 each 5    glucose blood test strip Accu-chek Monica Plus meter   insulin aspart (NovoLOG FlexPen) 100 UNIT/ML injection pen 15 Units 3 (three) times a day with meals      insulin glargine (Lantus SoloStar) 100 units/mL injection pen Inject 30 Units under the skin daily (Patient taking differently: Inject 60 Units under the skin daily) 5 pen 3    Lancet Devices (ACCU-CHEK SOFTCLIX) lancets To be used B  I D 1 each 5    Lancets (ACCU-CHEK SOFT TOUCH) lancets Test once or twice daily, e11 9 accu check guide 100 each 5    levothyroxine 50 mcg tablet TAKE 1 TABLET BY MOUTH ONCE DAILY IN THE EARLY MORNING 90 tablet 3    lisinopril (ZESTRIL) 20 mg tablet Take 20 mg by mouth      sertraline (Zoloft) 50 mg tablet Take 1 5 tablets (75 mg total) by mouth daily 45 tablet 5    Trulicity 1 5 UX/1 1EK SOPN INJECT 1 SUBCUTANEOUSLY ONCE A WEEK      insulin lispro (HumaLOG KwikPen) 100 units/mL injection pen Inject 5 Units under the skin 3 (three) times a day with meals (Patient not taking: Reported on 5/1/2023) 5 pen 2    metoprolol tartrate (LOPRESSOR) 50 mg tablet Take 25 mg by mouth 2 (two) times a day   (Patient not taking: Reported on 5/1/2023)       No current facility-administered medications for this visit           Health Maintenance     Health Maintenance   Topic Date Due    Cervical Cancer Screening  Never done    Breast Cancer Screening: Mammogram  Never done    Colorectal Cancer Screening  Never done    DM Eye Exam  08/08/2017    COVID-19 Vaccine (3 - Booster for Mariam Boeck series) 07/03/2021    BMI: Followup Plan  06/10/2023    Kidney Health Evaluation: GFR  07/01/2023    Hepatitis C Screening  01/02/2024 (Originally 1960)    HIV Screening  06/10/2024 (Originally 7/22/1975)    HEMOGLOBIN A1C  11/01/2023    Annual Physical  12/09/2023    Diabetic Foot Exam  12/12/2023    BMI: Adult  05/01/2024    Kidney Health Evaluation: Microalbumin/Creatinine Ratio  05/01/2024    DTaP,Tdap,and Td Vaccines (3 - Td or Tdap) 12/09/2032    Pneumococcal Vaccine: Pediatrics (0 to 5 Years) and At-Risk Patients (6 to 59 Years)  Completed    Influenza Vaccine  Completed    HIB Vaccine  Aged Out    IPV Vaccine  Aged Out    Hepatitis A Vaccine  Aged Out    Meningococcal ACWY Vaccine  Aged Out    HPV Vaccine  Aged Out     Immunization History   Administered Date(s) Administered    COVID-19 MODERNA VACC 0 5 ML IM 04/10/2021, 05/08/2021    Fluzone Split Quad 0 25 mL 10/29/2018    INFLUENZA 01/13/2018, 10/29/2018, 10/31/2020, 12/03/2021, 11/18/2022    Influenza, injectable, quadrivalent, preservative free 0 5 mL 11/13/2019    Pneumococcal Conjugate Vaccine 20-valent (Pcv20), Polysace 12/09/2022, 12/09/2022    Pneumococcal Polysaccharide PPV23 08/01/2010, 09/30/2013    Tdap 12/09/2022, 12/09/2022    Zoster 09/28/2018    Zoster Vaccine Recombinant 09/28/2018, 02/16/2019     BMI Counseling: Body mass index is 42 69 kg/m²  The BMI is above normal  Nutrition recommendations include decreasing portion sizes, encouraging healthy choices of fruits and vegetables, decreasing fast food intake, consuming healthier snacks, limiting drinks that contain sugar, moderation in carbohydrate intake, increasing intake of lean protein, reducing intake of saturated and trans fat and reducing intake of cholesterol  Exercise recommendations include exercising 3-5 times per week and strength training exercises  No pharmacotherapy was ordered  Rationale for BMI follow-up plan is due to patient being overweight or obese           GIANLUCA Palma

## 2023-05-02 ENCOUNTER — TELEPHONE (OUTPATIENT)
Dept: FAMILY MEDICINE CLINIC | Facility: CLINIC | Age: 63
End: 2023-05-02

## 2023-05-02 PROBLEM — R41.3 MEMORY LOSS: Status: RESOLVED | Noted: 2018-07-10 | Resolved: 2023-05-02

## 2023-05-02 NOTE — ASSESSMENT & PLAN NOTE
Lab Results   Component Value Date    HGBA1C 9 0 (A) 05/01/2023     Uncontrolled  Follow up with endocrinology

## 2023-05-02 NOTE — TELEPHONE ENCOUNTER
Patient requesting a call back to discuss test results         Ordering Provider:   Date Completed:     Lab [x]  MRI []  X-Ray []  CT []  Colonoscopy []  EGD []  Biopsy []  Other []

## 2023-05-02 NOTE — PATIENT INSTRUCTIONS
Type 2 Diabetes Management for Adults   AMBULATORY CARE:   Type 2 diabetes  is a disease that affects how your body uses glucose (sugar)  Either your body cannot make enough insulin, or it cannot use the insulin correctly  It is important to keep diabetes controlled to prevent damage to your heart, blood vessels, and other organs  Management will help you feel well and enjoy your daily activities  Your diabetes care team providers can help you make a plan to fit diabetes care into your schedule  Your plan can change over time to fit your needs and your family's needs  Have someone call your local emergency number (911 in the [address]) if:    You cannot be woken   You have signs of diabetic ketoacidosis:     ? confusion, fatigue    ? vomiting    ? rapid heartbeat    ? fruity smelling breath    ? extreme thirst    ? dry mouth and skin     You have any of the following signs of a heart attack:      ? Squeezing, pressure, or pain in your chest    ? You may  also have any of the following:     - Discomfort or pain in your back, neck, jaw, stomach, or arm    - Shortness of breath    - Nausea or vomiting    - Lightheadedness or a sudden cold sweat     You have any of the following signs of a stroke:      ? Numbness or drooping on one side of your face     ? Weakness in an arm or leg    ? Confusion or difficulty speaking    ? Dizziness, a severe headache, or vision loss    Call your doctor or diabetes care team provider if:    You have a sore or wound that will not heal      You have a change in the amount you urinate   Your blood sugar levels are higher than your target goals   You often have lower blood sugar levels than your target goals   Your skin is red, dry, warm, or swollen   You have trouble coping with diabetes, or you feel anxious or depressed   You have questions or concerns about your condition or care      What you need to know about high blood sugar levels:  High blood sugar levels may not cause any symptoms  You may feel more thirsty or urinate more often than usual  Over time, high blood sugar levels can damage your nerves, blood vessels, tissues, and organs  The following can increase your blood sugar levels:   Large meals or large amounts of carbohydrates at one time     Less physical activity     Stress     Illness     A lower dose of diabetes medicine or insulin, or a late dose    What you need to know about low blood sugar levels:  Symptoms include feeling shaky, dizzy, irritable, or confused  You can prevent symptoms by keeping your blood sugar levels from going too low   Treat a low blood sugar level right away:      ? Drink 4 ounces of juice or have 1 tube of glucose gel  ? Check your blood sugar level again 10 to 15 minutes later  ? When the level goes back to normal, eat a meal or snack to prevent another decrease   Keep glucose gel, raisins, or hard candy with you at all times to treat a low blood sugar level   Your blood sugar level can get too low if you take diabetes medicine or insulin and do not eat enough food   If you use insulin, check your blood sugar level before you exercise  ? If your blood sugar level is below 100 mg/dL, eat 4 crackers or 2 ounces of raisins, or drink 4 ounces of juice  ? Check your level every 30 minutes if you exercise longer than 1 hour  ? You may need a snack during or after exercise  What you can do to manage your blood sugar levels:    Check your blood sugar levels as directed and as needed  Several items are available to use to check your levels  You may need to check by testing a drop of blood in a glucose monitor  You may instead be given a continuous glucose monitoring (CGM) device  The device is worn at all times  The CGM checks your blood sugar level every 5 minutes  It sends results to an electronic device such as a smart phone  A CGM can be used with or without an insulin pump   You and your diabetes care team providers will decide on the best method for you  The goal for blood sugar levels before meals  is between 80 and 130 mg/dL and 2 hours after eating  is lower than 180 mg/dL   Make healthy food choices  Work with a dietitian to develop a meal plan that works for you and your schedule  A dietitian can help you learn how to eat the right amount of carbohydrates during your meals and snacks  Carbohydrates can raise your blood sugar level if you eat too many at one time  Examples of foods that contain carbohydrates are breads, cereals, rice, pasta, and sweets   Eat high-fiber foods as directed  Fiber helps improve blood sugar levels  Fiber also lowers your risk for heart disease and other problems diabetes can cause  Examples of high-fiber foods include vegetables, whole-grain bread, and beans such as leal beans  Your dietitian can tell you how much fiber to have each day   Get regular physical activity  Physical activity can help you get to your target blood sugar level goal and manage your weight  Get at least 150 minutes of moderate to vigorous aerobic physical activity each week  Do not miss more than 2 days in a row  Do not sit longer than 30 minutes at a time  Your healthcare provider can help you create an activity plan  The plan can include the best activities for you and can help you build your strength and endurance   Maintain a healthy weight  Ask your team what a healthy weight is for you  A healthy weight can help you control diabetes and prevent heart disease  Ask your team to help you create a weight loss plan, if needed  Weight loss can help make a difference in managing diabetes  Your team will help you set a weight-loss goal, such as 10 to 15 pounds, or 5% of your extra weight  Together you and your team can set manageable weight loss goals   Take your diabetes medicine or insulin as directed    You may need diabetes medicine, insulin, or both to help control your blood sugar levels  Your healthcare provider will teach you how and when to take your diabetes medicine or insulin  You will also be taught about side effects oral diabetes medicine can cause  Insulin may be injected or given through a pump or pen  You and your providers will decide on the best method for you:    ? An insulin pump  is an implanted device that gives your insulin 24 hours a day  An insulin pump prevents the need for multiple insulin injections in a day  ? An insulin pen  is a device prefilled with the right amount of insulin  ? You and your family members will be taught how to draw up and give insulin  if this is the best method for you  Your providers will also teach you how to dispose of needles and syringes  ? You will learn how much insulin you need  and when to give it  You will be taught when not to give insulin  You will also be taught what to do if your blood sugar level drops too low  This may happen if you take insulin and do not eat the right amount of carbohydrates  More ways to manage type 2 diabetes:    Wear medical alert identification  Wear medical alert jewelry or carry a card that says you have diabetes  Ask your provider where to get these items   Do not smoke  Nicotine and other chemicals in cigarettes and cigars can cause lung and blood vessel damage  It also makes it more difficult to manage your diabetes  Ask your provider for information if you currently smoke and need help to quit  Do not use e-cigarettes or smokeless tobacco in place of cigarettes or to help you quit  They still contain nicotine   Check your feet each day for cuts, scratches, calluses, or other wounds  Look for redness and swelling, and feel for warmth  Wear shoes that fit well  Check your shoes for rocks or other objects that can hurt your feet  Do not walk barefoot or wear shoes without socks   Wear cotton socks to help keep your feet dry   Ask about vaccines you may need  You have a higher risk for serious illness if you get the flu, pneumonia, COVID-19, or hepatitis  Ask your provider if you should get vaccines to prevent these or other diseases, and when to get the vaccines   Talk to your provider if you become stressed about diabetes care  Sometimes being able to fit diabetes care into your life can cause increased stress  The stress can cause you not to take care of yourself properly  Your care team providers can help by offering tips about self-care  Your providers may suggest you talk to a mental health provider who can listen and offer help with self-care issues   Have your A1c checked as directed  Your provider may check your A1c every 3 months, or 2 times each year if your diabetes is controlled  An A1c test shows the average amount of sugar in your blood over the past 2 to 3 months  Your provider will tell you what your A1c level should be   Have screening tests as directed  Your provider may recommend screening for complications of diabetes and other conditions that may develop  Some screenings may begin right away and some may happen within the first 5 years of diagnosis:    ? Examples of diabetes complications  include kidney problems, high cholesterol, high blood pressure, blood vessel problems, eye problems, and sleep apnea  ? You may be screened for a low vitamin B level  if you take oral diabetes medicine for a long time  ? Women of childbearing years may be screened  for polycystic ovarian syndrome (PCOS)  Follow up with your doctor or diabetes care team providers as directed: You may need to have blood tests done before your follow-up visit  The test results will show if changes need to be made in your treatment or self-care  Talk to your provider if you cannot afford your medicine  Write down your questions so you remember to ask them during your visits    © Copyright [employer] 2022 Information is for End User's use only and may not be sold, redistributed or otherwise used for commercial purposes  The above information is an  only  It is not intended as medical advice for individual conditions or treatments  Talk to your doctor, nurse or pharmacist before following any medical regimen to see if it is safe and effective for you  Basic Carbohydrate Counting   AMBULATORY CARE:   Carbohydrate counting  is a way to plan your meals by counting the amount of carbohydrate in foods  Carbohydrates are the sugars, starches, and fiber found in fruit, grains, vegetables, and milk products  Carbohydrates increase your blood sugar levels  Carbohydrate counting can help you eat the right amount of carbohydrate to keep your blood sugar levels under control  What you need to know about planning meals using carbohydrate counting:   A dietitian or healthcare provider will help you develop a healthy meal plan that works best for you  You will be taught how much carbohydrate to eat or drink for each meal and snack  Your meal plan will be based on your age, weight, usual food intake, and physical activity level  If you have diabetes, it will also include your blood sugar levels and diabetes medicine  Once you know how much carbohydrate you should eat, you can decide what type of food you want to eat   You will need to know what foods contain carbohydrate and how much they contain  Keep track of the amount of carbohydrate in meals and snacks in order to follow your meal plan  Do not avoid carbohydrates or skip meals  Your blood sugar may fall too low if you do not eat enough carbohydrate or you skip meals  Foods that contain carbohydrate:    Breads:  Each serving of food listed below contains about 15 g of carbohydrate   ? 1 slice of bread (1 ounce) or 1 flour or corn tortilla (6 inch)    ? ½ of a hamburger bun or ¼ of a large bagel (about 1 ounce)    ?  1 pancake (about 4 inches across and ¼ inch thick)     Cereals and grains:  Serving sizes of ready-to-eat cereals vary  Look at the serving size and the total carbohydrate amount listed on the food label  Each serving of food listed below contains about 15 g of carbohydrate   ? ¾ cup of dry, unsweetened, ready-to-eat cereal or ¼ cup of low-fat granola     ? ½ cup of oatmeal or other cooked cereal     ? ? cup of cooked rice or pasta     Starchy vegetables and beans:  Each serving of food listed below contains about 15 g of carbohydrate   ? ½ cup of corn, green peas, sweet potatoes, or mashed potatoes    ? ¼ of a large baked potato    ? ½ cup of beans, lentils, and peas (garbanzo, leal, kidney, white, split, black-eyed)     Crackers and snacks:  Each serving of food listed below contains about 15 g of carbohydrate   ? 3 karlos cracker squares or 8 animal crackers     ? 6 saltine-type crackers    ? 3 cups of popcorn or ¾ ounce of pretzels, potato chips, or tortilla chips     Fruit:  Each serving of food listed below contains about 15 g of carbohydrate   ? 1 small (4 ounce) piece of fresh fruit or ¾ to 1 cup of fresh fruit    ? ½ cup of canned or frozen fruit, packed in natural juice    ? ½ cup (4 ounces) of unsweetened fruit juice    ? 2 tablespoons of dried fruit     Desserts or sugary foods:  Each serving of food listed below contains about 15 g of carbohydrate   ? 2-inch square unfrosted cake or brownie     ? 2 small cookies    ? ½ cup of ice cream, frozen yogurt, or nondairy frozen yogurt    ? ¼ cup of sherbet or sorbet    ? 1 tablespoon of regular syrup, jam, or jelly    ? 2 tablespoons of light syrup     Milk and yogurt:  Foods from the milk group contain about 12 g of carbohydrate per serving  ? 1 cup of fat-free or low-fat milk    ? 1 cup of soy milk    ? ? cup of fat-free, yogurt sweetened with artificial sweetener     Non-starchy vegetables:  Each serving contains about 5 g of carbohydrate    Three servings of non-starch vegetables count as 1 carbohydrate serving  ? ½ cup of cooked vegetables or 1 cup of raw vegetables  This includes beets, broccoli, cabbage, cauliflower, cucumber, mushrooms, tomatoes, and zucchini    ? ½ cup of vegetable juice    How to use carbohydrate counting to plan meals:    Count carbohydrate amounts using serving sizes:      ? Pasta dinner example: You plan to have pasta, tossed salad, and an 8-ounce glass of milk  Your healthcare provider tells you that you may have 4 carbohydrate servings for dinner  One carbohydrate serving of pasta is ? cup  One cup of pasta will equal 3 carbohydrate servings  An 8-ounce glass of milk will count as 1 carbohydrate serving  These amounts of food would equal 4 carbohydrate servings  One cup of tossed salad does not count toward your carbohydrate servings   Count carbohydrate amounts using food labels:  Find the total amount of carbohydrate in a packaged food by reading the food label  Food labels tell you the serving size of the food and the total carbohydrate amount in each serving  Find the serving size on the food label and then decide how many servings you will eat  Multiply the number of servings you plan to eat by the carbohydrate amount per serving  ? Granola bar snack example: Your meal plan allows you to have 2 carbohydrate servings (30 grams) of carbohydrate for a snack  You plan to eat 1 package of granola bars, which contains 2 bars  According to the food label, the serving size of food in this package is 1 bar  Each serving (1 bar) contains 25 grams of carbohydrate  The total amount of carbohydrate in this package of granola bars would be 50 g  Based on your meal plan, you should eat only 1 bar  Follow up with your doctor as directed:  Write down your questions so you remember to ask them during your visits    © Copyright [employer] 2022 Information is for End User's use only and may not be sold, redistributed or otherwise used for commercial purposes  The above information is an  only  It is not intended as medical advice for individual conditions or treatments  Talk to your doctor, nurse or pharmacist before following any medical regimen to see if it is safe and effective for you  Foot Care for People with Diabetes   AMBULATORY CARE:   What you need to know about foot care:  Long-term high blood sugar levels can damage the blood vessels and nerves in your legs and feet  This damage makes it hard to feel pressure, pain, temperature, and touch  You may not be able to feel a cut or sore, or shoes that are too tight  Foot care is needed to prevent serious problems, such as an infection or amputation  Diabetes may cause your toes to become crooked or curved under  These changes may affect the way you walk and can lead to increased pressure on your foot  The pressure can decrease blood flow to your feet  Lack of blood flow increases your risk for a foot ulcer  Call your care team provider if:    Your feet become numb, weak, or hard to move   You have pus draining from a sore on your foot   You have a wound on your foot that gets bigger, deeper, or does not heal      You see blisters, cuts, scratches, calluses, or sores on your foot   You have a fever, and your feet become red, warm, and swollen   Your toenails become thick, curled, or yellow   You find it hard to check your feet because your vision is poor   You have questions or concerns about your condition or care  How to care for your feet:    Check your feet each day  Look at your whole foot, including the bottom, and between and under your toes  Check for wounds, corns, and calluses  Use a mirror to see the bottom of your feet  The skin on your feet may be shiny, tight, or darker than normal  Your feet may also be cold and pale  Feel your feet by running your hands along the tops, bottoms, sides, and between your toes   Redness, swelling, and warmth are signs of blood flow problems that can lead to a foot ulcer  Do not try to remove corns or calluses yourself  Do not ignore small problems, such as dry skin or small wounds  These can become life-threatening over time without proper care   Wash your feet each day with soap and warm water  Do not use hot water, because this can injure your foot  Dry your feet gently with a towel after you wash them  Dry between and under your toes   Apply lotion or a moisturizer on your dry feet  Ask your care team provider what lotions are best to use  Do not put lotion or moisturizer between your toes  Moisture between your toes could lead to skin breakdown   Cut your toenails correctly  File or cut your toenails straight across  Use a soft brush to clean around your toenails  If your toenails are very thick, you may need to have a care team provider or specialist cut them   Protect your feet  Do not walk barefoot or wear your shoes without socks  Check your shoes for rocks or other objects that can hurt your feet  Wear cotton socks to help keep your feet dry  Wear socks without toe seams, or wear them with the seams inside out  Change your socks each day  Do not wear socks that are dirty or damp   Wear shoes that fit well  Wear shoes that do not rub against any area of your feet  Your shoes should be ½ to ¾ inch (1 to 2 centimeters) longer than your feet  Your shoes should also have extra space around the widest part of your feet  Walking or athletic shoes with laces or straps that adjust are best  Ask your care team provider for help to choose shoes that fit you best  Ask your provider if you need to wear an insert, orthotic, or bandage on your feet   Go to your follow-up visits  Your care team provider will do a foot exam at least 1 time each year  You may need a foot exam more often if you have nerve damage, foot deformities, or ulcers   Your provider will check for nerve damage and how well you can feel your feet  Your provider will check your shoes to see if they fit well   Do not smoke  Smoking can damage your blood vessels and put you at increased risk for foot ulcers  Ask your care team provider for information if you currently smoke and need help to quit  E-cigarettes or smokeless tobacco still contain nicotine  Talk to your care team provider before you use these products  Follow up with your diabetes care team provider or foot specialist as directed: You will need to have your feet checked at least 1 time each year  You may need a foot exam more often if you have nerve damage, foot deformities, or ulcers  Write down your questions so you remember to ask them during your visits  © Copyright [facility] 2022 Information is for End User's use only and may not be sold, redistributed or otherwise used for commercial purposes  The above information is an  only  It is not intended as medical advice for individual conditions or treatments  Talk to your doctor, nurse or pharmacist before following any medical regimen to see if it is safe and effective for you

## 2023-05-11 ENCOUNTER — OFFICE VISIT (OUTPATIENT)
Dept: FAMILY MEDICINE CLINIC | Facility: CLINIC | Age: 63
End: 2023-05-11

## 2023-05-11 VITALS
HEIGHT: 63 IN | SYSTOLIC BLOOD PRESSURE: 132 MMHG | DIASTOLIC BLOOD PRESSURE: 68 MMHG | BODY MASS INDEX: 43.33 KG/M2 | OXYGEN SATURATION: 95 % | HEART RATE: 75 BPM | RESPIRATION RATE: 18 BRPM | TEMPERATURE: 99.9 F

## 2023-05-11 DIAGNOSIS — J40 BRONCHITIS: Primary | ICD-10-CM

## 2023-05-11 RX ORDER — BENZONATATE 100 MG/1
100 CAPSULE ORAL 3 TIMES DAILY PRN
Qty: 20 CAPSULE | Refills: 0 | Status: SHIPPED | OUTPATIENT
Start: 2023-05-11

## 2023-05-11 RX ORDER — DULAGLUTIDE 3 MG/.5ML
3 INJECTION, SOLUTION SUBCUTANEOUS DAILY
COMMUNITY
Start: 2023-05-08

## 2023-05-11 RX ORDER — AZITHROMYCIN 250 MG/1
TABLET, FILM COATED ORAL
Qty: 6 TABLET | Refills: 0 | Status: SHIPPED | OUTPATIENT
Start: 2023-05-11 | End: 2023-05-16

## 2023-05-11 NOTE — PROGRESS NOTES
Name: Daniel Griffith      : 1960      MRN: [id number]  Encounter Provider: GIANLUCA Osorio  Encounter Date: 2023   Encounter department: [place] [id number]  Bronchitis  Assessment & Plan:  Symptoms not improving after 1 week  Start azithromycin and get chest x-ray to evaluated for pneumonia  Recommend guaifenesin prn, prescription sent for benzonatate at bedtime prn  Reinforce hydration  Pt instructed to call for reevaluation if sx worsen or persist       Orders:  -     azithromycin (Zithromax) 250 mg tablet; Take 2 tablets (500 mg total) by mouth daily for 1 day, THEN 1 tablet (250 mg total) daily for 4 days  -     benzonatate (TESSALON PERLES) 100 mg capsule; Take 1 capsule (100 mg total) by mouth 3 (three) times a day as needed for cough  -     XR chest pa & lateral; Future; Expected date: 2023           Subjective      Pt is a 63 y o  y/o female who is seen today for evaluation of cold symptoms  PMH of hypothyroidism, DM, ZANDER, atypical a-flutter, CHF, 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, HTN, kidney stones, anxiety, depression, obesity, dyslipidemia, history of afib, cardiac pacemaker, vitamin D deficiency  She started about a week ago with a cough, sob, headache, congestion, subjective fever with chills/sweats, sputum production, sore throat, n/v, appetite loss  No diarrhea, chest pain, palpitations  She is taking coricidin and acetaminophen prn  Her symptoms have not improved  Review of Systems   Constitutional: Positive for appetite change, chills, diaphoresis, fatigue and fever  HENT: Positive for congestion and sore throat  Negative for ear pain, postnasal drip and sinus pressure  Respiratory: Positive for cough and shortness of breath  Negative for chest tightness and wheezing  Cardiovascular: Negative for chest pain, palpitations and leg swelling  Gastrointestinal: Positive for nausea and vomiting  Negative for diarrhea  Musculoskeletal: Negative for myalgias  Neurological: Positive for dizziness (occasional) and headaches  Hematological: Negative for adenopathy  Current Outpatient Medications on File Prior to Visit   Medication Sig   • acetaminophen (TYLENOL) 500 mg tablet Take 500 mg by mouth every 6 (six) hours as needed   • amiodarone 100 mg tablet Take 100 mg by mouth daily   • amLODIPine (NORVASC) 5 mg tablet Take 5 mg by mouth daily   • apixaban (ELIQUIS) 5 mg Take 5 mg by mouth 2 (two) times a day     • atorvastatin (LIPITOR) 80 mg tablet Take 80 mg by mouth   • B-D ULTRAFINE III SHORT PEN 31G X 8 MM MISC Inject under the skin 4 (four) times a day   • BD Pen Needle Stephani 2nd Gen 32G X 4 MM MISC USE 1 TO INJECT INSULIN 4 TIMES DAILY   • Blood Glucose Monitoring Suppl (ACCU-CHEK GUIDE ME) w/Device KIT Test once or twice daily, e11 9   • Cholecalciferol 25 MCG (1000 UT) tablet Take 2,000 Units by mouth daily   • CRANBERRY PO Take by mouth   • furosemide (LASIX) 40 mg tablet Take 40 mg by mouth   • glucose blood (ACCU-CHEK MONICA PLUS) test strip 1 each by Other route 2 (two) times a day Use as instructed   • glucose blood test strip Accu-chek Monica Plus meter  • insulin aspart (NovoLOG FlexPen) 100 UNIT/ML injection pen 15 Units 3 (three) times a day with meals   • insulin glargine (Lantus SoloStar) 100 units/mL injection pen Inject 30 Units under the skin daily (Patient taking differently: Inject 60 Units under the skin daily)   • insulin lispro (HumaLOG KwikPen) 100 units/mL injection pen Inject 5 Units under the skin 3 (three) times a day with meals   • Lancet Devices (ACCU-CHEK SOFTCLIX) lancets To be used B  I D   • Lancets (ACCU-CHEK SOFT TOUCH) lancets Test once or twice daily, e11 9 accu check guide   • levothyroxine 50 mcg tablet TAKE 1 TABLET BY MOUTH ONCE DAILY IN THE EARLY MORNING   • lisinopril (ZESTRIL) 20 mg tablet Take 20 mg by mouth   • sertraline (Zoloft) 50 mg tablet Take 1 5 tablets (75 mg total) "by mouth daily   • Trulicity 3 II/9 1UE injection Inject 3 mg under the skin in the morning   • [DISCONTINUED] Trulicity 1 5 EW/8 0OE SOPN INJECT 1 SUBCUTANEOUSLY ONCE A WEEK   • metoprolol tartrate (LOPRESSOR) 50 mg tablet Take 25 mg by mouth 2 (two) times a day   (Patient not taking: Reported on 5/1/2023)       Objective     /68 (BP Location: Left arm, Patient Position: Sitting, Cuff Size: Large)   Pulse 75   Temp 99 9 °F (37 7 °C) (Tympanic)   Resp 18   Ht 5' 3\" (1 6 m)   LMP  (LMP Unknown)   SpO2 95%   BMI 43 33 kg/m²     Physical Exam  Vitals reviewed  Constitutional:       General: She is awake  She is not in acute distress  Appearance: Normal appearance  She is well-developed and well-groomed  She is obese  She is ill-appearing and diaphoretic  HENT:      Head: Normocephalic  Right Ear: Hearing, tympanic membrane, ear canal and external ear normal  No middle ear effusion  Tympanic membrane is not bulging  Left Ear: Hearing, tympanic membrane, ear canal and external ear normal   No middle ear effusion  Tympanic membrane is not bulging  Nose: Mucosal edema and rhinorrhea present  Rhinorrhea is clear  Mouth/Throat:      Lips: Pink  Mouth: Mucous membranes are moist  Mucous membranes are not dry  Pharynx: No oropharyngeal exudate or posterior oropharyngeal erythema  Tonsils: No tonsillar abscesses  Eyes:      General: Lids are normal       Conjunctiva/sclera: Conjunctivae normal    Cardiovascular:      Rate and Rhythm: Normal rate and regular rhythm  Heart sounds: Normal heart sounds  No murmur heard  Pulmonary:      Effort: Pulmonary effort is normal  No accessory muscle usage or respiratory distress  Breath sounds: Examination of the left-lower field reveals decreased breath sounds  Decreased breath sounds present  No wheezing, rhonchi or rales     Lymphadenopathy:      Head:      Right side of head: No submental, submandibular, tonsillar, " preauricular, posterior auricular or occipital adenopathy  Left side of head: No submental, submandibular, tonsillar, preauricular, posterior auricular or occipital adenopathy  Cervical: No cervical adenopathy  Skin:     General: Skin is warm and moist    Neurological:      Mental Status: She is alert and oriented to person, place, and time  Psychiatric:         Attention and Perception: Attention normal          Mood and Affect: Mood normal          Speech: Speech normal          Behavior: Behavior normal  Behavior is cooperative  Thought Content:  Thought content normal          Cognition and Memory: Cognition normal          Judgment: Judgment normal        Eliane Notice, CRNP

## 2023-05-11 NOTE — ASSESSMENT & PLAN NOTE
Symptoms not improving after 1 week  Start azithromycin and get chest x-ray to evaluated for pneumonia  Recommend guaifenesin prn, prescription sent for benzonatate at bedtime prn  Reinforce hydration    Pt instructed to call for reevaluation if sx worsen or persist

## 2023-05-12 ENCOUNTER — NURSE TRIAGE (OUTPATIENT)
Dept: OTHER | Facility: OTHER | Age: 63
End: 2023-05-12

## 2023-05-12 ENCOUNTER — TELEPHONE (OUTPATIENT)
Dept: FAMILY MEDICINE CLINIC | Facility: CLINIC | Age: 63
End: 2023-05-12

## 2023-05-12 ENCOUNTER — APPOINTMENT (OUTPATIENT)
Dept: LAB | Age: 63
End: 2023-05-12

## 2023-05-12 ENCOUNTER — APPOINTMENT (OUTPATIENT)
Dept: RADIOLOGY | Age: 63
End: 2023-05-12

## 2023-05-12 DIAGNOSIS — E11.8 TYPE II DIABETES MELLITUS WITH COMPLICATION (HCC): ICD-10-CM

## 2023-05-12 DIAGNOSIS — E03.9 ACQUIRED HYPOTHYROIDISM: ICD-10-CM

## 2023-05-12 DIAGNOSIS — Z12.31 ENCOUNTER FOR SCREENING MAMMOGRAM FOR BREAST CANCER: ICD-10-CM

## 2023-05-12 DIAGNOSIS — J40 BRONCHITIS: ICD-10-CM

## 2023-05-12 DIAGNOSIS — E78.5 DYSLIPIDEMIA: ICD-10-CM

## 2023-05-12 DIAGNOSIS — E11.59 TYPE 2 DIABETES MELLITUS WITH OTHER CIRCULATORY COMPLICATION, WITH LONG-TERM CURRENT USE OF INSULIN (HCC): ICD-10-CM

## 2023-05-12 DIAGNOSIS — F41.9 ANXIETY AND DEPRESSION: ICD-10-CM

## 2023-05-12 DIAGNOSIS — J18.9 PNEUMONIA OF LEFT LOWER LOBE DUE TO INFECTIOUS ORGANISM: Primary | ICD-10-CM

## 2023-05-12 DIAGNOSIS — E66.01 CLASS 3 SEVERE OBESITY DUE TO EXCESS CALORIES WITH SERIOUS COMORBIDITY AND BODY MASS INDEX (BMI) OF 40.0 TO 44.9 IN ADULT (HCC): ICD-10-CM

## 2023-05-12 DIAGNOSIS — F32.A ANXIETY AND DEPRESSION: ICD-10-CM

## 2023-05-12 DIAGNOSIS — I10 ESSENTIAL HYPERTENSION: ICD-10-CM

## 2023-05-12 DIAGNOSIS — E55.9 VITAMIN D DEFICIENCY: ICD-10-CM

## 2023-05-12 DIAGNOSIS — Z79.4 TYPE 2 DIABETES MELLITUS WITH OTHER CIRCULATORY COMPLICATION, WITH LONG-TERM CURRENT USE OF INSULIN (HCC): ICD-10-CM

## 2023-05-12 LAB
25(OH)D3 SERPL-MCNC: 83.5 NG/ML (ref 30–100)
ALBUMIN SERPL BCP-MCNC: 3.1 G/DL (ref 3.5–5)
ALP SERPL-CCNC: 126 U/L (ref 46–116)
ALT SERPL W P-5'-P-CCNC: 43 U/L (ref 12–78)
ANION GAP SERPL CALCULATED.3IONS-SCNC: 3 MMOL/L (ref 4–13)
AST SERPL W P-5'-P-CCNC: 42 U/L (ref 5–45)
BASOPHILS # BLD AUTO: 0.06 THOUSANDS/ÂΜL (ref 0–0.1)
BASOPHILS NFR BLD AUTO: 1 % (ref 0–1)
BILIRUB SERPL-MCNC: 0.66 MG/DL (ref 0.2–1)
BUN SERPL-MCNC: 17 MG/DL (ref 5–25)
CALCIUM ALBUM COR SERPL-MCNC: 10.2 MG/DL (ref 8.3–10.1)
CALCIUM SERPL-MCNC: 9.5 MG/DL (ref 8.3–10.1)
CHLORIDE SERPL-SCNC: 107 MMOL/L (ref 96–108)
CHOLEST SERPL-MCNC: 95 MG/DL
CO2 SERPL-SCNC: 26 MMOL/L (ref 21–32)
CREAT SERPL-MCNC: 1.13 MG/DL (ref 0.6–1.3)
EOSINOPHIL # BLD AUTO: 0.1 THOUSAND/ÂΜL (ref 0–0.61)
EOSINOPHIL NFR BLD AUTO: 1 % (ref 0–6)
ERYTHROCYTE [DISTWIDTH] IN BLOOD BY AUTOMATED COUNT: 13.6 % (ref 11.6–15.1)
EST. AVERAGE GLUCOSE BLD GHB EST-MCNC: 194 MG/DL
GFR SERPL CREATININE-BSD FRML MDRD: 52 ML/MIN/1.73SQ M
GLUCOSE P FAST SERPL-MCNC: 125 MG/DL (ref 65–99)
HBA1C MFR BLD: 8.4 %
HCT VFR BLD AUTO: 40.9 % (ref 34.8–46.1)
HDLC SERPL-MCNC: 44 MG/DL
HGB BLD-MCNC: 12.9 G/DL (ref 11.5–15.4)
IMM GRANULOCYTES # BLD AUTO: 0.03 THOUSAND/UL (ref 0–0.2)
IMM GRANULOCYTES NFR BLD AUTO: 0 % (ref 0–2)
LDLC SERPL CALC-MCNC: 31 MG/DL (ref 0–100)
LYMPHOCYTES # BLD AUTO: 2.17 THOUSANDS/ÂΜL (ref 0.6–4.47)
LYMPHOCYTES NFR BLD AUTO: 21 % (ref 14–44)
MCH RBC QN AUTO: 31.5 PG (ref 26.8–34.3)
MCHC RBC AUTO-ENTMCNC: 31.5 G/DL (ref 31.4–37.4)
MCV RBC AUTO: 100 FL (ref 82–98)
MONOCYTES # BLD AUTO: 0.93 THOUSAND/ÂΜL (ref 0.17–1.22)
MONOCYTES NFR BLD AUTO: 9 % (ref 4–12)
NEUTROPHILS # BLD AUTO: 7.07 THOUSANDS/ÂΜL (ref 1.85–7.62)
NEUTS SEG NFR BLD AUTO: 68 % (ref 43–75)
NRBC BLD AUTO-RTO: 0 /100 WBCS
PLATELET # BLD AUTO: 465 THOUSANDS/UL (ref 149–390)
PMV BLD AUTO: 10.3 FL (ref 8.9–12.7)
POTASSIUM SERPL-SCNC: 3.7 MMOL/L (ref 3.5–5.3)
PROT SERPL-MCNC: 9 G/DL (ref 6.4–8.4)
RBC # BLD AUTO: 4.1 MILLION/UL (ref 3.81–5.12)
SODIUM SERPL-SCNC: 136 MMOL/L (ref 135–147)
TRIGL SERPL-MCNC: 102 MG/DL
TSH SERPL DL<=0.05 MIU/L-ACNC: 0.96 UIU/ML (ref 0.45–4.5)
WBC # BLD AUTO: 10.36 THOUSAND/UL (ref 4.31–10.16)

## 2023-05-12 RX ORDER — AMOXICILLIN AND CLAVULANATE POTASSIUM 875; 125 MG/1; MG/1
1 TABLET, FILM COATED ORAL EVERY 12 HOURS SCHEDULED
Qty: 20 TABLET | Refills: 0 | Status: SHIPPED | OUTPATIENT
Start: 2023-05-12 | End: 2023-05-22

## 2023-05-12 NOTE — TELEPHONE ENCOUNTER
"Regarding: prescription issue  ----- Message from Gabriela Ortega sent at 5/12/2023  1:20 PM EDT -----  \" I was told that I would have a steroid and an antibiotic called into the pharmacy but they only have the prescription for the antibiotic  \"    "

## 2023-05-12 NOTE — TELEPHONE ENCOUNTER
"  Reason for Disposition  • [1] Prescription not at pharmacy AND [2] was prescribed by PCP recently (Exception: triager has access to EMR and prescription is recorded there  Go to Home Care and confirm for pharmacy )    Answer Assessment - Initial Assessment Questions  1  NAME of MEDICATION: \"What medicine are you calling about? \"      Steroid not called into pharmacy  2  QUESTION: Dalton Daley is your question? \" (e g , medication refill, side effect)      We talked about it and she said she would send an RX for a steroid and antibiotic  3  PRESCRIBING HCP: \"Who prescribed it? \" Reason: if prescribed by specialist, call should be referred to that group  [facility]   4  SYMPTOMS: \"Do you have any symptoms? \"      Pneumonia   5  SEVERITY: If symptoms are present, ask \"Are they mild, moderate or severe? \"      n/a  6  PREGNANCY:  \"Is there any chance that you are pregnant? \" \"When was your last menstrual period? \"      n/a    Protocols used: MEDICATION QUESTION CALL-ADULT-    "

## 2023-05-12 NOTE — TELEPHONE ENCOUNTER
Spoke with pt to review her chest x-ray result  For LLL consolidation, will add augmentin to her treatment  She also has a small pleural effusion  I advised to take additional tab of her lasix today and 1 5 tabs tomorrow  If symptoms worsen on the weekend she should go to the er or urgent care  Call with problems next week  Pt verbalizes understanding

## 2023-05-15 ENCOUNTER — OFFICE VISIT (OUTPATIENT)
Dept: URGENT CARE | Age: 63
End: 2023-05-15

## 2023-05-15 VITALS
HEART RATE: 66 BPM | TEMPERATURE: 97.3 F | DIASTOLIC BLOOD PRESSURE: 68 MMHG | SYSTOLIC BLOOD PRESSURE: 100 MMHG | RESPIRATION RATE: 12 BRPM | OXYGEN SATURATION: 96 %

## 2023-05-15 DIAGNOSIS — R06.02 SHORTNESS OF BREATH: Primary | ICD-10-CM

## 2023-05-15 RX ORDER — IPRATROPIUM BROMIDE AND ALBUTEROL SULFATE 2.5; .5 MG/3ML; MG/3ML
3 SOLUTION RESPIRATORY (INHALATION) ONCE
Status: COMPLETED | OUTPATIENT
Start: 2023-05-15 | End: 2023-05-15

## 2023-05-15 RX ORDER — ALBUTEROL SULFATE 90 UG/1
2 AEROSOL, METERED RESPIRATORY (INHALATION) EVERY 6 HOURS PRN
Qty: 8.5 G | Refills: 1 | Status: SHIPPED | OUTPATIENT
Start: 2023-05-15

## 2023-05-15 RX ADMIN — IPRATROPIUM BROMIDE AND ALBUTEROL SULFATE 3 ML: 2.5; .5 SOLUTION RESPIRATORY (INHALATION) at 16:29

## 2023-05-15 NOTE — PATIENT INSTRUCTIONS
Trial albuterol inhaler every 6 hours as needed for shortness of breath and chest tightness  Continue antibiotics as prescribed by PCP  If symptoms persist, please follow-up with primary care provider  If symptoms worsen, please go to ER

## 2023-05-15 NOTE — TELEPHONE ENCOUNTER
LM for patient to call office RE:    GIANLUCA Almazan  You 3 days ago     I think she misheard me, I did mention a steroid but then I said I did not want to put her on one at this point       MF

## 2023-05-15 NOTE — PROGRESS NOTES
[facility]        NAME: Cande Reeves is a 63 y o  female  : 1960    MRN: [id number]  DATE: May 15, 2023  TIME: 4:52 PM    Assessment and Plan   Shortness of breath [R06 02]  1  Shortness of breath  albuterol (ProAir HFA) 90 mcg/act inhaler    ipratropium-albuterol (DUO-NEB) 0 5-2 5 mg/3 mL inhalation solution 3 mL        DuoNeb given in office, patient's lungs clear to auscultation posttreatment, oxygen saturation 96%, heart rate 66%  Patient verbalizes that she feels as if she can take a deep breath at this time  Patient Instructions     Trial albuterol inhaler every 6 hours as needed for shortness of breath and chest tightness  Continue antibiotics as prescribed by PCP  If symptoms persist, please follow-up with primary care provider  If symptoms worsen, please go to ER  Follow up with PCP in 3-5 days  Proceed to  ER if symptoms worsen  Chief Complaint     Chief Complaint   Patient presents with   • Shortness of Breath     Symptoms started 1-1 5 weeks ago; diagnosed with bronchitis on  and then pneumonia on ; chest hurts from coughing   • Cough     Non-productive   • Fever     High of 100   • Fatigue   • Wheezing     Pt can hear wheezing when laying down         History of Present Illness       Patient with a past medical history of diabetes and CHF presenting for evaluation of shortness of breath  Patient states that she has been having upper respiratory symptoms over the past week including chest tightness, productive cough, wheezing when lying down, shortness of breath, chills and subjective fever  Patient was seen by her primary care provider and diagnosed with bronchitis and pneumonia  She is currently on Augmentin for this infection and has 6 days left of treatment  She is presenting today as recommended by her PCP for evaluation due to ongoing shortness of breath  Patient states that she has been taking Tessalon with minimal relief of the cough    She denies any chest pain or measured fevers at this time  Review of Systems   Review of Systems   Constitutional: Positive for chills and fever  HENT: Positive for congestion and sore throat  Respiratory: Positive for cough, shortness of breath and wheezing  Cardiovascular: Negative for chest pain  Gastrointestinal: Negative for diarrhea, nausea and vomiting  All other systems reviewed and are negative  Current Medications       Current Outpatient Medications:   •  albuterol (ProAir HFA) 90 mcg/act inhaler, Inhale 2 puffs every 6 (six) hours as needed for wheezing or shortness of breath, Disp: 8 5 g, Rfl: 1  •  acetaminophen (TYLENOL) 500 mg tablet, Take 500 mg by mouth every 6 (six) hours as needed, Disp: , Rfl:   •  amiodarone 100 mg tablet, Take 100 mg by mouth daily, Disp: , Rfl:   •  amLODIPine (NORVASC) 5 mg tablet, Take 5 mg by mouth daily, Disp: , Rfl: 5  •  amoxicillin-clavulanate (Augmentin) 875-125 mg per tablet, Take 1 tablet by mouth every 12 (twelve) hours for 10 days, Disp: 20 tablet, Rfl: 0  •  apixaban (ELIQUIS) 5 mg, Take 5 mg by mouth 2 (two) times a day  , Disp: , Rfl:   •  atorvastatin (LIPITOR) 80 mg tablet, Take 80 mg by mouth, Disp: , Rfl:   •  azithromycin (Zithromax) 250 mg tablet, Take 2 tablets (500 mg total) by mouth daily for 1 day, THEN 1 tablet (250 mg total) daily for 4 days  , Disp: 6 tablet, Rfl: 0  •  B-D ULTRAFINE III SHORT PEN 31G X 8 MM MISC, Inject under the skin 4 (four) times a day, Disp: 200 each, Rfl: 5  •  BD Pen Needle Stephani 2nd Gen 32G X 4 MM MISC, USE 1 TO INJECT INSULIN 4 TIMES DAILY, Disp: , Rfl:   •  benzonatate (TESSALON PERLES) 100 mg capsule, Take 1 capsule (100 mg total) by mouth 3 (three) times a day as needed for cough, Disp: 20 capsule, Rfl: 0  •  Blood Glucose Monitoring Suppl (ACCU-CHEK GUIDE ME) w/Device KIT, Test once or twice daily, e11 9, Disp: 1 kit, Rfl: 0  •  Cholecalciferol 25 MCG (1000 UT) tablet, Take 2,000 Units by mouth daily, Disp: , Rfl:   •  CRANBERRY PO, Take by mouth, Disp: , Rfl:   •  furosemide (LASIX) 40 mg tablet, Take 40 mg by mouth, Disp: , Rfl:   •  glucose blood (ACCU-CHEK MONICA PLUS) test strip, 1 each by Other route 2 (two) times a day Use as instructed, Disp: 100 each, Rfl: 5  •  glucose blood test strip, Accu-chek Monica Plus meter  , Disp: , Rfl:   •  insulin aspart (NovoLOG FlexPen) 100 UNIT/ML injection pen, 15 Units 3 (three) times a day with meals, Disp: , Rfl:   •  insulin glargine (Lantus SoloStar) 100 units/mL injection pen, Inject 30 Units under the skin daily (Patient taking differently: Inject 60 Units under the skin daily), Disp: 5 pen, Rfl: 3  •  insulin lispro (HumaLOG KwikPen) 100 units/mL injection pen, Inject 5 Units under the skin 3 (three) times a day with meals, Disp: 5 pen, Rfl: 2  •  Lancet Devices (ACCU-CHEK SOFTCLIX) lancets, To be used B  I D, Disp: 1 each, Rfl: 5  •  Lancets (ACCU-CHEK SOFT TOUCH) lancets, Test once or twice daily, e11 9 accu check guide, Disp: 100 each, Rfl: 5  •  levothyroxine 50 mcg tablet, TAKE 1 TABLET BY MOUTH ONCE DAILY IN THE EARLY MORNING, Disp: 90 tablet, Rfl: 3  •  lisinopril (ZESTRIL) 20 mg tablet, Take 20 mg by mouth, Disp: , Rfl:   •  metoprolol tartrate (LOPRESSOR) 50 mg tablet, Take 25 mg by mouth 2 (two) times a day   (Patient not taking: Reported on 5/1/2023), Disp: , Rfl:   •  sertraline (Zoloft) 50 mg tablet, Take 1 5 tablets (75 mg total) by mouth daily, Disp: 45 tablet, Rfl: 5  •  Trulicity 3 CZ/7 4VA injection, Inject 3 mg under the skin in the morning, Disp: , Rfl:   No current facility-administered medications for this visit  Current Allergies     Allergies as of 05/15/2023 - Reviewed 05/15/2023   Allergen Reaction Noted   • Skin protectants, misc   Dermatitis 06/30/2022            The following portions of the patient's history were reviewed and updated as appropriate: allergies, current medications, past family history, past medical history, past social history, past surgical history and problem list      Past Medical History:   Diagnosis Date   • Atrial fibrillation ([facility] [address] )    • CHF (congestive heart failure) ([facility] [address] )    • Diabetes ([facility] [address] )    • Necrotizing fasciitis ([facility] [address] )        Past Surgical History:   Procedure Laterality Date   • CARDIAC ELECTROPHYSIOLOGY MAPPING AND ABLATION     • EXCESSIVE THIGH / HIP / BUTTOCK / FLANK SKIN EXCISION Right     necrotizing fascitis removal- right hip   • HYSTERECTOMY  1996       Family History   Problem Relation Age of Onset   • Hypertension Mother    • Coronary artery disease Father    • Diabetes Father    • Hypertension Father          Medications have been verified  Objective   /68 (BP Location: Left arm, Patient Position: Sitting, Cuff Size: Standard)   Pulse 66   Temp (!) 97 3 °F (36 3 °C) (Temporal)   Resp 12   LMP  (LMP Unknown)   SpO2 96%        Physical Exam     Physical Exam  Vitals and nursing note reviewed  Constitutional:       General: She is not in acute distress  Appearance: Normal appearance  She is well-developed  She is not ill-appearing, toxic-appearing or diaphoretic  HENT:      Head: Normocephalic and atraumatic  Right Ear: Tympanic membrane normal       Left Ear: Tympanic membrane normal       Nose: Congestion present  No rhinorrhea  Mouth/Throat:      Mouth: Mucous membranes are moist       Pharynx: Oropharynx is clear  No oropharyngeal exudate or posterior oropharyngeal erythema  Eyes:      General:         Right eye: No discharge  Left eye: No discharge  Cardiovascular:      Rate and Rhythm: Normal rate and regular rhythm  Pulses: Normal pulses  Heart sounds: Normal heart sounds  No murmur heard  No friction rub  No gallop  Pulmonary:      Effort: Pulmonary effort is normal  No respiratory distress  Breath sounds: No stridor  Wheezing (Scattered wheezes throughout) present  No decreased breath sounds, rhonchi or rales     Chest:      Chest wall: No tenderness  Abdominal:      General: Bowel sounds are normal       Palpations: Abdomen is soft  Tenderness: There is no abdominal tenderness  Skin:     General: Skin is warm and dry  Neurological:      Mental Status: She is alert     Psychiatric:         Mood and Affect: Mood normal          Behavior: Behavior normal

## 2023-05-15 NOTE — TELEPHONE ENCOUNTER
Patient calling saying she is still not feeling any better  She says she is taking the augmentin and did take the extra lasix on Saturday but is still coughing and congested      Please advise

## 2023-06-19 DIAGNOSIS — F32.A ANXIETY AND DEPRESSION: ICD-10-CM

## 2023-06-19 DIAGNOSIS — F41.9 ANXIETY AND DEPRESSION: ICD-10-CM

## 2023-07-12 DIAGNOSIS — F32.A ANXIETY AND DEPRESSION: ICD-10-CM

## 2023-07-12 DIAGNOSIS — F41.9 ANXIETY AND DEPRESSION: ICD-10-CM

## 2023-07-13 ENCOUNTER — VBI (OUTPATIENT)
Dept: ADMINISTRATIVE | Facility: OTHER | Age: 63
End: 2023-07-13

## 2023-08-09 DIAGNOSIS — F41.9 ANXIETY AND DEPRESSION: ICD-10-CM

## 2023-08-09 DIAGNOSIS — F32.A ANXIETY AND DEPRESSION: ICD-10-CM

## 2023-09-11 NOTE — ASSESSMENT & PLAN NOTE
Called [employer], price for transport, cost will be $55.65 for 21 miles.   Talked with Elizabeth. Will request pt assistance funds.    Stable  Cont meds  Cont f/u with cardiology

## 2023-09-18 DIAGNOSIS — F32.A ANXIETY AND DEPRESSION: ICD-10-CM

## 2023-09-18 DIAGNOSIS — F41.9 ANXIETY AND DEPRESSION: ICD-10-CM

## 2023-10-15 DIAGNOSIS — F32.A ANXIETY AND DEPRESSION: ICD-10-CM

## 2023-10-15 DIAGNOSIS — F41.9 ANXIETY AND DEPRESSION: ICD-10-CM

## 2023-11-02 ENCOUNTER — OFFICE VISIT (OUTPATIENT)
Dept: FAMILY MEDICINE CLINIC | Facility: CLINIC | Age: 63
End: 2023-11-02
Payer: COMMERCIAL

## 2023-11-02 VITALS
SYSTOLIC BLOOD PRESSURE: 136 MMHG | HEART RATE: 101 BPM | OXYGEN SATURATION: 96 % | RESPIRATION RATE: 18 BRPM | TEMPERATURE: 98.9 F | BODY MASS INDEX: 41.04 KG/M2 | HEIGHT: 63 IN | WEIGHT: 231.6 LBS | DIASTOLIC BLOOD PRESSURE: 84 MMHG

## 2023-11-02 DIAGNOSIS — Z79.4 TYPE 2 DIABETES MELLITUS WITH OTHER CIRCULATORY COMPLICATION, WITH LONG-TERM CURRENT USE OF INSULIN (HCC): Primary | ICD-10-CM

## 2023-11-02 DIAGNOSIS — Z79.899 HIGH RISK MEDICATION USE: ICD-10-CM

## 2023-11-02 DIAGNOSIS — E78.5 DYSLIPIDEMIA: ICD-10-CM

## 2023-11-02 DIAGNOSIS — E03.9 ACQUIRED HYPOTHYROIDISM: ICD-10-CM

## 2023-11-02 DIAGNOSIS — F32.A ANXIETY AND DEPRESSION: ICD-10-CM

## 2023-11-02 DIAGNOSIS — E66.01 CLASS 3 SEVERE OBESITY DUE TO EXCESS CALORIES WITH SERIOUS COMORBIDITY AND BODY MASS INDEX (BMI) OF 40.0 TO 44.9 IN ADULT (HCC): ICD-10-CM

## 2023-11-02 DIAGNOSIS — F41.9 ANXIETY AND DEPRESSION: ICD-10-CM

## 2023-11-02 DIAGNOSIS — E11.59 TYPE 2 DIABETES MELLITUS WITH OTHER CIRCULATORY COMPLICATION, WITH LONG-TERM CURRENT USE OF INSULIN (HCC): Primary | ICD-10-CM

## 2023-11-02 DIAGNOSIS — I10 ESSENTIAL HYPERTENSION: ICD-10-CM

## 2023-11-02 PROBLEM — J40 BRONCHITIS: Status: RESOLVED | Noted: 2023-05-11 | Resolved: 2023-11-02

## 2023-11-02 PROBLEM — Z95.0 BIVENTRICULAR CARDIAC PACEMAKER IN SITU: Chronic | Status: ACTIVE | Noted: 2021-11-04

## 2023-11-02 PROCEDURE — 99214 OFFICE O/P EST MOD 30 MIN: CPT | Performed by: NURSE PRACTITIONER

## 2023-11-02 NOTE — PATIENT INSTRUCTIONS
Type 2 Diabetes Management for Adults   AMBULATORY CARE:   Type 2 diabetes  is a disease that affects how your body uses glucose (sugar). Either your body cannot make enough insulin, or it cannot use the insulin correctly. It is important to keep diabetes controlled to prevent damage to your heart, blood vessels, and other organs. Management will help you feel well and enjoy your daily activities. Your diabetes care team providers can help you make a plan to fit diabetes care into your schedule. Your plan can change over time to fit your needs and your family's needs. Have someone call your local emergency number (911 in the [address]) if:   You cannot be woken. You have signs of diabetic ketoacidosis:     confusion, fatigue    vomiting    rapid heartbeat    fruity smelling breath    extreme thirst    dry mouth and skin    You have any of the following signs of a heart attack:      Squeezing, pressure, or pain in your chest    You may  also have any of the following:     Discomfort or pain in your back, neck, jaw, stomach, or arm    Shortness of breath    Nausea or vomiting    Lightheadedness or a sudden cold sweat    You have any of the following signs of a stroke:      Numbness or drooping on one side of your face     Weakness in an arm or leg    Confusion or difficulty speaking    Dizziness, a severe headache, or vision loss    Call your doctor or diabetes care team provider if:   You have a sore or wound that will not heal.    You have a change in the amount you urinate. Your blood sugar levels are higher than your target goals. You often have lower blood sugar levels than your target goals. Your skin is red, dry, warm, or swollen. You have trouble coping with diabetes, or you feel anxious or depressed. You have trouble following any part of your care plan, such as your meal plan. You have questions or concerns about your condition or care.     What you need to know about high blood sugar levels:  High blood sugar levels may not cause any symptoms. You may feel more thirsty or urinate more often than usual. Over time, high blood sugar levels can damage your nerves, blood vessels, tissues, and organs. The following can increase your blood sugar levels:  Large meals or large amounts of carbohydrates at one time    Less physical activity    Stress    Illness    A lower dose of diabetes medicine or insulin, or a late dose    What you need to know about low blood sugar levels:  Symptoms include feeling shaky, dizzy, irritable, or confused. You can prevent symptoms by keeping your blood sugar levels from going too low. Treat a low blood sugar level right away:      Drink 4 ounces of juice or have 1 tube of glucose gel. Check your blood sugar level again 10 to 15 minutes later. When the level goes back to normal, eat a meal or snack to prevent another decrease. Keep glucose gel, raisins, or hard candy with you at all times to treat a low blood sugar level. Your blood sugar level can get too low if you take diabetes medicine or insulin and do not eat enough food. If you use insulin, check your blood sugar level before you exercise. If your blood sugar level is below 100 mg/dL, eat 4 crackers or 2 ounces of raisins, or drink 4 ounces of juice. Check your level every 30 minutes if you exercise longer than 1 hour. You may need a snack during or after exercise. What you can do to manage your blood sugar levels:   Check your blood sugar levels as directed and as needed. Several items are available to use to check your levels. You may need to check by testing a drop of blood in a glucose monitor. You may instead be given a continuous glucose monitoring (CGM) device. The device is worn at all times. The CGM checks your blood sugar level every 5 minutes. It sends results to an electronic device such as a smart phone. A CGM can be used with or without an insulin pump.  You and your diabetes care team providers will decide on the best method for you. The goal for blood sugar levels before meals  is between 80 and 130 mg/dL and 2 hours after eating  is lower than 180 mg/dL. Make healthy food choices. Work with a dietitian to create a meal plan that works for you and your schedule. A dietitian can help you learn how to eat the right amount of carbohydrates (sugar and starchy foods) during your meals and snacks. Examples of carbohydrates are breads, cereals, rice, pasta, fruit, low-fat dairy, and sweets. Carbohydrates can raise your blood sugar level if you eat too many at one time. Eat high-fiber foods as directed. Fiber helps improve blood sugar levels. Fiber also lowers your risk for heart disease and other problems diabetes can cause. Examples of high-fiber foods include vegetables, whole-grain bread, and beans such as leal beans. Your dietitian can tell you how much fiber to have each day. Get regular physical activity. Physical activity can help you get to your target blood sugar level goal and manage your weight. Get at least 150 minutes of moderate to vigorous aerobic physical activity each week. Resistance training, such as lifting weights, should be done 3 times each week. Do not miss more than 2 days of physical activity in a row. Do not sit longer than 30 minutes at a time. Your healthcare provider can help you create an activity plan. The plan can include the best activities for you and can help you build your strength and endurance. Maintain a healthy weight. Ask your team what a healthy weight is for you. A healthy weight can help you control diabetes and prevent heart disease. Ask your team to help you create a weight-loss plan, if needed. Even a loss of 3% to 7% of your excess body weight can help make a difference in managing diabetes. Your team will help you set a weight-loss goal, such as 10 to 15 pounds, or 5% of your extra weight. Together you and your team can set manageable weight-loss goals. Take your diabetes medicine or insulin as directed. You may need diabetes medicine, insulin, or both to help control your blood sugar levels. Your healthcare provider will teach you how and when to take your diabetes medicine or insulin. You will also be taught about side effects oral diabetes medicine can cause. Insulin may be injected or given through a pump or pen. You and your providers will decide on the best method for you: An insulin pump  is an implanted device that gives your insulin 24 hours a day. An insulin pump prevents the need for multiple insulin injections in a day. An insulin pen  is a device prefilled with the right amount of insulin. You and your family members will be taught how to draw up and give insulin  if this is the best method for you. Your providers will also teach you how to dispose of needles and syringes. You will learn how much insulin you need  and when to give it. You will be taught when not to give insulin. You will also be taught what to do if your blood sugar level drops too low. This may happen if you take insulin and do not eat the right amount of carbohydrates. More ways to manage type 2 diabetes:   Wear medical alert identification. Wear medical alert jewelry or carry a card that says you have diabetes. Ask your provider where to get these items. Do not smoke. Nicotine and other chemicals in cigarettes and cigars can cause lung and blood vessel damage. It also makes it more difficult to manage your diabetes. Ask your provider for information if you currently smoke and need help to quit. Do not use e-cigarettes or smokeless tobacco in place of cigarettes or to help you quit. They still contain nicotine. Check your feet each day for cuts, scratches, calluses, or other wounds. Look for redness and swelling, and feel for warmth. Wear shoes that fit well.  Check your shoes for rocks or other objects that can hurt your feet. Do not walk barefoot or wear shoes without socks. Wear cotton socks to help keep your feet dry. Ask about vaccines you may need. You have a higher risk for serious illness if you get the flu, pneumonia, COVID-19, or hepatitis. Ask your provider if you should get vaccines to prevent these or other diseases, and when to get the vaccines. Talk to your provider if you become stressed about diabetes care. Sometimes being able to fit diabetes care into your life can cause increased stress. The stress can cause you not to take care of yourself properly. Your provider can help by offering tips about self-care. A mental health provider can listen and offer help with self-care issues. Other types of counseling can help you make nutrition or physical activity changes. Have your A1c checked as directed. Your provider may check your A1c every 3 months, or 2 times each year if your diabetes is controlled. An A1c test shows the average amount of sugar in your blood over the past 2 to 3 months. Your provider will tell you what your A1c level should be. Have screening tests as directed. Your provider may recommend screening for complications of diabetes and other conditions that may develop. Some screenings may begin right away and some may happen within the first 5 years of diagnosis:    Examples of diabetes complications  include kidney problems, high cholesterol, high blood pressure, blood vessel problems, eye problems, and sleep apnea. You may be screened for a low vitamin B level  if you take oral diabetes medicine for a long time. You may be screened for polycystic ovarian syndrome (PCOS)  if you are of childbearing age. Follow up with your doctor or diabetes care team providers as directed: You may need to have blood tests done before your follow-up visit. The test results will show if changes need to be made in your treatment or self-care. Talk to your provider if you cannot afford your medicine. Write down your questions so you remember to ask them during your visits. © Copyright Julián Sin 2023 Information is for End User's use only and may not be sold, redistributed or otherwise used for commercial purposes. The above information is an  only. It is not intended as medical advice for individual conditions or treatments. Talk to your doctor, nurse or pharmacist before following any medical regimen to see if it is safe and effective for you. Foot Care for People with Diabetes   AMBULATORY CARE:   What you need to know about foot care:  Long-term high blood sugar levels can damage the blood vessels and nerves in your legs and feet. This damage makes it hard to feel pressure, pain, temperature, and touch. You may not be able to feel a cut or sore, or shoes that are too tight. Foot care is needed to prevent serious problems, such as an infection or amputation. Diabetes may cause your toes to become crooked or curved under. These changes may affect the way you walk and can lead to increased pressure on your foot. The pressure can decrease blood flow to your feet. Lack of blood flow increases your risk for a foot ulcer. Call your care team provider if:   Your feet become numb, weak, or hard to move. You have pus draining from a sore on your foot. You have a wound on your foot that gets bigger, deeper, or does not heal.    You see blisters, cuts, scratches, calluses, or sores on your foot. You have a fever, and your feet become red, warm, and swollen. Your toenails become thick, curled, or yellow. You find it hard to check your feet because your vision is poor. You have questions or concerns about your condition or care. How to care for your feet:   Check your feet each day. Look at your whole foot, including the bottom, and between and under your toes. Check for wounds, corns, and calluses.  Use a mirror to see the bottom of your feet. The skin on your feet may be shiny, tight, or darker than normal. Your feet may also be cold and pale. Feel your feet by running your hands along the tops, bottoms, sides, and between your toes. Redness, swelling, and warmth are signs of blood flow problems that can lead to a foot ulcer. Do not try to remove corns or calluses yourself. Do not ignore small problems, such as dry skin or small wounds. These can become life-threatening over time without proper care. Wash your feet each day with soap and warm water. Do not use hot water, because this can injure your foot. Dry your feet gently with a towel after you wash them. Dry between and under your toes. Apply lotion or a moisturizer on your dry feet. Ask your care team provider what lotions are best to use. Do not put lotion or moisturizer between your toes. Moisture between your toes could lead to skin breakdown. Cut your toenails correctly. File or cut your toenails straight across. Use a soft brush to clean around your toenails. If your toenails are very thick, you may need to have a care team provider or specialist cut them. Protect your feet. Do not walk barefoot or wear your shoes without socks. Check your shoes for rocks or other objects that can hurt your feet. Wear cotton socks to help keep your feet dry. Wear socks without toe seams, or wear them with the seams inside out. Change your socks each day. Do not wear socks that are dirty or damp. Wear shoes that fit well. Wear shoes that do not rub against any area of your feet. Your shoes should be ½ to ¾ inch (1 to 2 centimeters) longer than your feet. Your shoes should also have extra space around the widest part of your feet. Walking or athletic shoes with laces or straps that adjust are best. Ask your care team provider for help to choose shoes that fit you best. Ask your provider if you need to wear an insert, orthotic, or bandage on your feet.          Go to your follow-up visits. Your care team provider will do a foot exam at least 1 time each year. You may need a foot exam more often if you have nerve damage, foot deformities, or ulcers. Your provider will check for nerve damage and how well you can feel your feet. Your provider will check your shoes to see if they fit well. Do not smoke. Smoking can damage your blood vessels and put you at increased risk for foot ulcers. Ask your care team provider for information if you currently smoke and need help to quit. E-cigarettes or smokeless tobacco still contain nicotine. Talk to your care team provider before you use these products. Follow up with your diabetes care team provider or foot specialist as directed: You will need to have your feet checked at least 1 time each year. You may need a foot exam more often if you have nerve damage, foot deformities, or ulcers. Write down your questions so you remember to ask them during your visits. © Copyright Mila Albemma 2023 Information is for End User's use only and may not be sold, redistributed or otherwise used for commercial purposes. The above information is an  only. It is not intended as medical advice for individual conditions or treatments. Talk to your doctor, nurse or pharmacist before following any medical regimen to see if it is safe and effective for you.

## 2023-11-02 NOTE — PROGRESS NOTES
FAMILY PRACTICE OFFICE VISIT       NAME: Raghav Ayala  AGE: 66 y.o. SEX: female       : 1960        MRN: [id number]    DATE: 2023  TIME: 11:57 AM    Assessment and Plan   1. Type 2 diabetes mellitus with other circulatory complication, with long-term current use of insulin (HCC)  Assessment & Plan:  Cont meds and f/u with endo    Lab Results   Component Value Date    HGBA1C 8.4 (H) 2023       Orders:  -     Hemoglobin A1C; Future; Expected date: 2023  -     Comprehensive metabolic panel; Future; Expected date: 2023  -     CBC and differential; Future; Expected date: 2023  -     Lipid Panel with Direct LDL reflex; Future; Expected date: 2023  -     TSH, 3rd generation with Free T4 reflex; Future; Expected date: 2023    2. Acquired hypothyroidism  Assessment & Plan:  Cont meds  Labs      Orders:  -     Hemoglobin A1C; Future; Expected date: 2023  -     Comprehensive metabolic panel; Future; Expected date: 2023  -     CBC and differential; Future; Expected date: 2023  -     Lipid Panel with Direct LDL reflex; Future; Expected date: 2023  -     TSH, 3rd generation with Free T4 reflex; Future; Expected date: 2023    3. Essential hypertension  Assessment & Plan:  Stable  Cont med      Orders:  -     Hemoglobin A1C; Future; Expected date: 2023  -     Comprehensive metabolic panel; Future; Expected date: 2023  -     CBC and differential; Future; Expected date: 2023  -     Lipid Panel with Direct LDL reflex; Future; Expected date: 2023  -     TSH, 3rd generation with Free T4 reflex; Future; Expected date: 2023    4. Dyslipidemia  Assessment & Plan:  Stable  Cont meds      Orders:  -     Hemoglobin A1C; Future; Expected date: 2023  -     Comprehensive metabolic panel; Future; Expected date: 2023  -     CBC and differential; Future; Expected date: 2023  -     Lipid Panel with Direct LDL reflex;  Future; Expected date: 11/02/2023  -     TSH, 3rd generation with Free T4 reflex; Future; Expected date: 11/02/2023    5. Class 3 severe obesity due to excess calories with serious comorbidity and body mass index (BMI) of 40.0 to 44.9 in adult [facility])  Assessment & Plan:  Diet and exercise      Orders:  -     Hemoglobin A1C; Future; Expected date: 11/02/2023  -     Comprehensive metabolic panel; Future; Expected date: 11/02/2023  -     CBC and differential; Future; Expected date: 11/02/2023  -     Lipid Panel with Direct LDL reflex; Future; Expected date: 11/02/2023  -     TSH, 3rd generation with Free T4 reflex; Future; Expected date: 11/02/2023    6. High risk medication use  -     Hemoglobin A1C; Future; Expected date: 11/02/2023  -     Comprehensive metabolic panel; Future; Expected date: 11/02/2023  -     CBC and differential; Future; Expected date: 11/02/2023  -     Lipid Panel with Direct LDL reflex; Future; Expected date: 11/02/2023  -     TSH, 3rd generation with Free T4 reflex; Future; Expected date: 11/02/2023    7. Anxiety and depression  Assessment & Plan:  Stable  Cont meds             Patient Instructions   Type 2 Diabetes Management for Adults   AMBULATORY CARE:   Type 2 diabetes  is a disease that affects how your body uses glucose (sugar). Either your body cannot make enough insulin, or it cannot use the insulin correctly. It is important to keep diabetes controlled to prevent damage to your heart, blood vessels, and other organs. Management will help you feel well and enjoy your daily activities. Your diabetes care team providers can help you make a plan to fit diabetes care into your schedule. Your plan can change over time to fit your needs and your family's needs. Have someone call your local emergency number (911 in the [address]) if:   You cannot be woken.     You have signs of diabetic ketoacidosis:     confusion, fatigue    vomiting    rapid heartbeat    fruity smelling breath    extreme thirst    dry mouth and skin    You have any of the following signs of a heart attack:      Squeezing, pressure, or pain in your chest    You may  also have any of the following:     Discomfort or pain in your back, neck, jaw, stomach, or arm    Shortness of breath    Nausea or vomiting    Lightheadedness or a sudden cold sweat    You have any of the following signs of a stroke:      Numbness or drooping on one side of your face     Weakness in an arm or leg    Confusion or difficulty speaking    Dizziness, a severe headache, or vision loss    Call your doctor or diabetes care team provider if:   You have a sore or wound that will not heal.    You have a change in the amount you urinate. Your blood sugar levels are higher than your target goals. You often have lower blood sugar levels than your target goals. Your skin is red, dry, warm, or swollen. You have trouble coping with diabetes, or you feel anxious or depressed. You have trouble following any part of your care plan, such as your meal plan. You have questions or concerns about your condition or care. What you need to know about high blood sugar levels:  High blood sugar levels may not cause any symptoms. You may feel more thirsty or urinate more often than usual. Over time, high blood sugar levels can damage your nerves, blood vessels, tissues, and organs. The following can increase your blood sugar levels:  Large meals or large amounts of carbohydrates at one time    Less physical activity    Stress    Illness    A lower dose of diabetes medicine or insulin, or a late dose    What you need to know about low blood sugar levels:  Symptoms include feeling shaky, dizzy, irritable, or confused. You can prevent symptoms by keeping your blood sugar levels from going too low. Treat a low blood sugar level right away:      Drink 4 ounces of juice or have 1 tube of glucose gel. Check your blood sugar level again 10 to 15 minutes later.     When the level goes back to normal, eat a meal or snack to prevent another decrease. Keep glucose gel, raisins, or hard candy with you at all times to treat a low blood sugar level. Your blood sugar level can get too low if you take diabetes medicine or insulin and do not eat enough food. If you use insulin, check your blood sugar level before you exercise. If your blood sugar level is below 100 mg/dL, eat 4 crackers or 2 ounces of raisins, or drink 4 ounces of juice. Check your level every 30 minutes if you exercise longer than 1 hour. You may need a snack during or after exercise. What you can do to manage your blood sugar levels:   Check your blood sugar levels as directed and as needed. Several items are available to use to check your levels. You may need to check by testing a drop of blood in a glucose monitor. You may instead be given a continuous glucose monitoring (CGM) device. The device is worn at all times. The CGM checks your blood sugar level every 5 minutes. It sends results to an electronic device such as a smart phone. A CGM can be used with or without an insulin pump. You and your diabetes care team providers will decide on the best method for you. The goal for blood sugar levels before meals  is between 80 and 130 mg/dL and 2 hours after eating  is lower than 180 mg/dL. Make healthy food choices. Work with a dietitian to create a meal plan that works for you and your schedule. A dietitian can help you learn how to eat the right amount of carbohydrates (sugar and starchy foods) during your meals and snacks. Examples of carbohydrates are breads, cereals, rice, pasta, fruit, low-fat dairy, and sweets. Carbohydrates can raise your blood sugar level if you eat too many at one time. Eat high-fiber foods as directed. Fiber helps improve blood sugar levels. Fiber also lowers your risk for heart disease and other problems diabetes can cause.  Examples of high-fiber foods include vegetables, whole-grain bread, and beans such as leal beans. Your dietitian can tell you how much fiber to have each day. Get regular physical activity. Physical activity can help you get to your target blood sugar level goal and manage your weight. Get at least 150 minutes of moderate to vigorous aerobic physical activity each week. Resistance training, such as lifting weights, should be done 3 times each week. Do not miss more than 2 days of physical activity in a row. Do not sit longer than 30 minutes at a time. Your healthcare provider can help you create an activity plan. The plan can include the best activities for you and can help you build your strength and endurance. Maintain a healthy weight. Ask your team what a healthy weight is for you. A healthy weight can help you control diabetes and prevent heart disease. Ask your team to help you create a weight-loss plan, if needed. Even a loss of 3% to 7% of your excess body weight can help make a difference in managing diabetes. Your team will help you set a weight-loss goal, such as 10 to 15 pounds, or 5% of your extra weight. Together you and your team can set manageable weight-loss goals. Take your diabetes medicine or insulin as directed. You may need diabetes medicine, insulin, or both to help control your blood sugar levels. Your healthcare provider will teach you how and when to take your diabetes medicine or insulin. You will also be taught about side effects oral diabetes medicine can cause. Insulin may be injected or given through a pump or pen. You and your providers will decide on the best method for you: An insulin pump  is an implanted device that gives your insulin 24 hours a day. An insulin pump prevents the need for multiple insulin injections in a day. An insulin pen  is a device prefilled with the right amount of insulin.          You and your family members will be taught how to draw up and give insulin  if this is the best method for you. Your providers will also teach you how to dispose of needles and syringes. You will learn how much insulin you need  and when to give it. You will be taught when not to give insulin. You will also be taught what to do if your blood sugar level drops too low. This may happen if you take insulin and do not eat the right amount of carbohydrates. More ways to manage type 2 diabetes:   Wear medical alert identification. Wear medical alert jewelry or carry a card that says you have diabetes. Ask your provider where to get these items. Do not smoke. Nicotine and other chemicals in cigarettes and cigars can cause lung and blood vessel damage. It also makes it more difficult to manage your diabetes. Ask your provider for information if you currently smoke and need help to quit. Do not use e-cigarettes or smokeless tobacco in place of cigarettes or to help you quit. They still contain nicotine. Check your feet each day for cuts, scratches, calluses, or other wounds. Look for redness and swelling, and feel for warmth. Wear shoes that fit well. Check your shoes for rocks or other objects that can hurt your feet. Do not walk barefoot or wear shoes without socks. Wear cotton socks to help keep your feet dry. Ask about vaccines you may need. You have a higher risk for serious illness if you get the flu, pneumonia, COVID-19, or hepatitis. Ask your provider if you should get vaccines to prevent these or other diseases, and when to get the vaccines. Talk to your provider if you become stressed about diabetes care. Sometimes being able to fit diabetes care into your life can cause increased stress. The stress can cause you not to take care of yourself properly. Your provider can help by offering tips about self-care. A mental health provider can listen and offer help with self-care issues.  Other types of counseling can help you make nutrition or physical activity changes. Have your A1c checked as directed. Your provider may check your A1c every 3 months, or 2 times each year if your diabetes is controlled. An A1c test shows the average amount of sugar in your blood over the past 2 to 3 months. Your provider will tell you what your A1c level should be. Have screening tests as directed. Your provider may recommend screening for complications of diabetes and other conditions that may develop. Some screenings may begin right away and some may happen within the first 5 years of diagnosis:    Examples of diabetes complications  include kidney problems, high cholesterol, high blood pressure, blood vessel problems, eye problems, and sleep apnea. You may be screened for a low vitamin B level  if you take oral diabetes medicine for a long time. You may be screened for polycystic ovarian syndrome (PCOS)  if you are of childbearing age. Follow up with your doctor or diabetes care team providers as directed: You may need to have blood tests done before your follow-up visit. The test results will show if changes need to be made in your treatment or self-care. Talk to your provider if you cannot afford your medicine. Write down your questions so you remember to ask them during your visits. © Copyright [employer]urt[employer]oreen 2023 Information is for End User's use only and may not be sold, redistributed or otherwise used for commercial purposes. The above information is an  only. It is not intended as medical advice for individual conditions or treatments. Talk to your doctor, nurse or pharmacist before following any medical regimen to see if it is safe and effective for you. Foot Care for People with Diabetes   AMBULATORY CARE:   What you need to know about foot care:  Long-term high blood sugar levels can damage the blood vessels and nerves in your legs and feet. This damage makes it hard to feel pressure, pain, temperature, and touch.  You may not be able to feel a cut or sore, or shoes that are too tight. Foot care is needed to prevent serious problems, such as an infection or amputation. Diabetes may cause your toes to become crooked or curved under. These changes may affect the way you walk and can lead to increased pressure on your foot. The pressure can decrease blood flow to your feet. Lack of blood flow increases your risk for a foot ulcer. Call your care team provider if:   Your feet become numb, weak, or hard to move. You have pus draining from a sore on your foot. You have a wound on your foot that gets bigger, deeper, or does not heal.    You see blisters, cuts, scratches, calluses, or sores on your foot. You have a fever, and your feet become red, warm, and swollen. Your toenails become thick, curled, or yellow. You find it hard to check your feet because your vision is poor. You have questions or concerns about your condition or care. How to care for your feet:   Check your feet each day. Look at your whole foot, including the bottom, and between and under your toes. Check for wounds, corns, and calluses. Use a mirror to see the bottom of your feet. The skin on your feet may be shiny, tight, or darker than normal. Your feet may also be cold and pale. Feel your feet by running your hands along the tops, bottoms, sides, and between your toes. Redness, swelling, and warmth are signs of blood flow problems that can lead to a foot ulcer. Do not try to remove corns or calluses yourself. Do not ignore small problems, such as dry skin or small wounds. These can become life-threatening over time without proper care. Wash your feet each day with soap and warm water. Do not use hot water, because this can injure your foot. Dry your feet gently with a towel after you wash them. Dry between and under your toes. Apply lotion or a moisturizer on your dry feet. Ask your care team provider what lotions are best to use.  Do not put lotion or moisturizer between your toes. Moisture between your toes could lead to skin breakdown. Cut your toenails correctly. File or cut your toenails straight across. Use a soft brush to clean around your toenails. If your toenails are very thick, you may need to have a care team provider or specialist cut them. Protect your feet. Do not walk barefoot or wear your shoes without socks. Check your shoes for rocks or other objects that can hurt your feet. Wear cotton socks to help keep your feet dry. Wear socks without toe seams, or wear them with the seams inside out. Change your socks each day. Do not wear socks that are dirty or damp. Wear shoes that fit well. Wear shoes that do not rub against any area of your feet. Your shoes should be ½ to ¾ inch (1 to 2 centimeters) longer than your feet. Your shoes should also have extra space around the widest part of your feet. Walking or athletic shoes with laces or straps that adjust are best. Ask your care team provider for help to choose shoes that fit you best. Ask your provider if you need to wear an insert, orthotic, or bandage on your feet. Go to your follow-up visits. Your care team provider will do a foot exam at least 1 time each year. You may need a foot exam more often if you have nerve damage, foot deformities, or ulcers. Your provider will check for nerve damage and how well you can feel your feet. Your provider will check your shoes to see if they fit well. Do not smoke. Smoking can damage your blood vessels and put you at increased risk for foot ulcers. Ask your care team provider for information if you currently smoke and need help to quit. E-cigarettes or smokeless tobacco still contain nicotine. Talk to your care team provider before you use these products. Follow up with your diabetes care team provider or foot specialist as directed: You will need to have your feet checked at least 1 time each year.  You may need a foot exam more often if you have nerve damage, foot deformities, or ulcers. Write down your questions so you remember to ask them during your visits. © Copyright Bryce Cespedes 2023 Information is for End User's use only and may not be sold, redistributed or otherwise used for commercial purposes. The above information is an  only. It is not intended as medical advice for individual conditions or treatments. Talk to your doctor, nurse or pharmacist before following any medical regimen to see if it is safe and effective for you. Chief Complaint     Chief Complaint   Patient presents with    Follow-up     Patient being seen for 4 month follow up     Diabetes    Hypertension       History of Present Illness   Laverne Lucas is a 66y.o.-year-old female who is here for f/u to chronic medical conditions  No further left leg wounds for months  Feeling great! Labs reviewed from may  Protein was elevated  Will check labs again  May need spep/upep  Saw cardiology and per patient all is ok  Follows with endo and to get labs done for f/u  Due for pap  Overdue for mammogram  [facility]s with [facility] for diabetic eye exam    Anxiety and depression under good control  No si or hi      Review of Systems   Review of Systems   Constitutional:  Negative for fatigue and fever. HENT:  Negative for congestion, postnasal drip and rhinorrhea. Eyes:  Negative for photophobia and visual disturbance. Respiratory:  Negative for cough, shortness of breath and wheezing. Cardiovascular:  Negative for chest pain and palpitations. Gastrointestinal:  Negative for constipation, diarrhea, nausea and vomiting. Genitourinary:  Negative for dysuria and frequency. Musculoskeletal:  Negative for arthralgias and myalgias. Skin:  Negative for rash. Neurological:  Negative for dizziness and headaches. Hematological:  Negative for adenopathy. Psychiatric/Behavioral:  Negative for dysphoric mood and sleep disturbance. The patient is not nervous/anxious.         Active Problem List     Patient Active Problem List   Diagnosis    Chronic anticoagulation    Chronic diastolic heart failure (HCC)    Diabetes mellitus with circulatory complication (HCC)    Essential hypertension    Mild obstructive sleep apnea    On amiodarone therapy    Vibration sensory loss    Class 3 severe obesity due to excess calories with serious comorbidity and body mass index (BMI) of 40.0 to 44.9 in adult (HCC)    Dyslipidemia    High risk medication use    History of atrial fibrillation    Anxiety and depression    Nephrolithiasis    Acquired hypothyroidism    Atypical atrial flutter ([facility])    S/P placement of cardiac pacemaker    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 ([facility])    Vitamin D deficiency    Biventricular cardiac pacemaker in situ    Persistent atrial fibrillation ([facility])         Past Medical History:  Past Medical History:   Diagnosis Date    Atrial fibrillation ([address])     CHF (congestive heart failure) ([facility])     Diabetes ([address])     Necrotizing fasciitis ([address])        Past Surgical History:  Past Surgical History:   Procedure Laterality Date    CARDIAC ELECTROPHYSIOLOGY MAPPING AND ABLATION      EXCESSIVE THIGH / Jose Chicas / BUTTOCK / FLANK SKIN EXCISION Right     necrotizing fascitis removal- right hip    HYSTERECTOMY         Family History:  Family History   Problem Relation Age of Onset    Hypertension Mother     Coronary artery disease Father     Diabetes Father     Hypertension Father        Social History:  Social History     Socioeconomic History    Marital status:      Spouse name: Not on file    Number of children: Not on file    Years of education: Not on file    Highest education level: Not on file   Occupational History    Not on file   Tobacco Use    Smoking status: Former     Types: Cigarettes     Quit date: 2014     Years since quittin.8     Passive exposure: Never    Smokeless tobacco: Never   Vaping Use    Vaping Use: Never used   Substance and Sexual Activity    Alcohol use: Yes     Comment: Rarely consumes     Drug use: Never    Sexual activity: Yes     Partners: Male     Birth control/protection: Surgical   Other Topics Concern    Not on file   Social History Narrative    Not on file     Social Determinants of Health     Financial Resource Strain: Not on file   Food Insecurity: Not on file   Transportation Needs: Not on file   Physical Activity: Not on file   Stress: Not on file   Social Connections: Not on file   Intimate Partner Violence: Not on file   Housing Stability: Not on file       Objective     Vitals:    11/02/23 1059   BP: 136/84   Pulse: 101   Resp: 18   Temp: 98.9 °F (37.2 °C)   SpO2: 96%     Wt Readings from Last 3 Encounters:   11/02/23 105 kg (231 lb 9.6 oz)   05/01/23 111 kg (244 lb 9.6 oz)   12/09/22 111 kg (244 lb 3.2 oz)       Physical Exam  Vitals reviewed. Constitutional:       Appearance: Normal appearance. HENT:      Head: Normocephalic and atraumatic. Right Ear: Tympanic membrane, ear canal and external ear normal.      Left Ear: Tympanic membrane, ear canal and external ear normal.      Nose: Nose normal.      Mouth/Throat:      Mouth: Mucous membranes are moist.   Eyes:      Conjunctiva/sclera: Conjunctivae normal.   Cardiovascular:      Rate and Rhythm: Normal rate and regular rhythm. Pulses: no weak pulses          Dorsalis pedis pulses are 2+ on the right side and 2+ on the left side. Heart sounds: Normal heart sounds. Pulmonary:      Effort: Pulmonary effort is normal.      Breath sounds: Normal breath sounds. Abdominal:      General: Bowel sounds are normal.   Musculoskeletal:         General: Normal range of motion. Cervical back: Normal range of motion. Feet:      Right foot:      Skin integrity: No ulcer, skin breakdown, erythema, warmth, callus or dry skin. Left foot:      Skin integrity: No ulcer, skin breakdown, erythema, warmth, callus or dry skin. Skin:     General: Skin is warm. Capillary Refill: Capillary refill takes less than 2 seconds. Neurological:      General: No focal deficit present. Mental Status: She is alert and oriented to person, place, and time. Psychiatric:         Mood and Affect: Mood normal.         Behavior: Behavior normal.         Thought Content:  Thought content normal.         Judgment: Judgment normal.         Pertinent Laboratory/Diagnostic Studies:  Lab Results   Component Value Date    BUN 17 05/12/2023    CREATININE 1.13 05/12/2023    CALCIUM 9.5 05/12/2023    K 3.7 05/12/2023    CO2 26 05/12/2023     05/12/2023     Lab Results   Component Value Date    ALT 43 05/12/2023    AST 42 05/12/2023    ALKPHOS 126 (H) 05/12/2023       Lab Results   Component Value Date    WBC 10.36 (H) 05/12/2023    HGB 12.9 05/12/2023    HCT 40.9 05/12/2023     (H) 05/12/2023     (H) 05/12/2023       No results found for: "TSH"    No results found for: "CHOL"  Lab Results   Component Value Date    TRIG 102 05/12/2023     Lab Results   Component Value Date    HDL 44 (L) 05/12/2023     Lab Results   Component Value Date    LDLCALC 31 05/12/2023     Lab Results   Component Value Date    HGBA1C 8.4 (H) 05/12/2023       Results for orders placed or performed in visit on 05/12/23   CBC and differential   Result Value Ref Range    WBC 10.36 (H) 4.31 - 10.16 Thousand/uL    RBC 4.10 3.81 - 5.12 Million/uL    Hemoglobin 12.9 11.5 - 15.4 g/dL    Hematocrit 40.9 34.8 - 46.1 %     (H) 82 - 98 fL    MCH 31.5 26.8 - 34.3 pg    MCHC 31.5 31.4 - 37.4 g/dL    RDW 13.6 11.6 - 15.1 %    MPV 10.3 8.9 - 12.7 fL    Platelets 931 (H) 482 - 390 Thousands/uL    nRBC 0 /100 WBCs    Neutrophils Relative 68 43 - 75 %    Immat GRANS % 0 0 - 2 %    Lymphocytes Relative 21 14 - 44 %    Monocytes Relative 9 4 - 12 %    Eosinophils Relative 1 0 - 6 %    Basophils Relative 1 0 - 1 %    Neutrophils Absolute 7.07 1.85 - 7.62 Thousands/µL Immature Grans Absolute 0.03 0.00 - 0.20 Thousand/uL    Lymphocytes Absolute 2.17 0.60 - 4.47 Thousands/µL    Monocytes Absolute 0.93 0.17 - 1.22 Thousand/µL    Eosinophils Absolute 0.10 0.00 - 0.61 Thousand/µL    Basophils Absolute 0.06 0.00 - 0.10 Thousands/µL   Vitamin D 25 hydroxy   Result Value Ref Range    Vit D, 25-Hydroxy 83.5 30.0 - 100.0 ng/mL   Lipid Panel with Direct LDL reflex   Result Value Ref Range    Cholesterol 95 See Comment mg/dL    Triglycerides 102 See Comment mg/dL    HDL, Direct 44 (L) >=50 mg/dL    LDL Calculated 31 0 - 100 mg/dL   Comprehensive metabolic panel   Result Value Ref Range    Sodium 136 135 - 147 mmol/L    Potassium 3.7 3.5 - 5.3 mmol/L    Chloride 107 96 - 108 mmol/L    CO2 26 21 - 32 mmol/L    ANION GAP 3 (L) 4 - 13 mmol/L    BUN 17 5 - 25 mg/dL    Creatinine 1.13 0.60 - 1.30 mg/dL    Glucose, Fasting 125 (H) 65 - 99 mg/dL    Calcium 9.5 8.3 - 10.1 mg/dL    Corrected Calcium 10.2 (H) 8.3 - 10.1 mg/dL    AST 42 5 - 45 U/L    ALT 43 12 - 78 U/L    Alkaline Phosphatase 126 (H) 46 - 116 U/L    Total Protein 9.0 (H) 6.4 - 8.4 g/dL    Albumin 3.1 (L) 3.5 - 5.0 g/dL    Total Bilirubin 0.66 0.20 - 1.00 mg/dL    eGFR 52 ml/min/1.73sq m   TSH, 3rd generation with Free T4 reflex   Result Value Ref Range    TSH 3RD GENERATON 0.962 0.450 - 4.500 uIU/mL   HEMOGLOBIN A1C W/ EAG ESTIMATION   Result Value Ref Range    Hemoglobin A1C 8.4 (H) Normal 3.8-5.6%; PreDiabetic 5.7-6.4%; Diabetic >=6.5%; Glycemic control for adults with diabetes <7.0% %     mg/dl       Orders Placed This Encounter   Procedures    Hemoglobin A1C    Comprehensive metabolic panel    CBC and differential    Lipid Panel with Direct LDL reflex    TSH, 3rd generation with Free T4 reflex       ALLERGIES:  Allergies   Allergen Reactions    Skin Protectants, Misc.  Dermatitis     Reports developing weeping dermatitis from topical use of Coloplast TRIAD Paste       Current Medications     Current Outpatient Medications Medication Sig Dispense Refill    acetaminophen (TYLENOL) 500 mg tablet Take 500 mg by mouth every 6 (six) hours as needed      albuterol (ProAir HFA) 90 mcg/act inhaler Inhale 2 puffs every 6 (six) hours as needed for wheezing or shortness of breath 8.5 g 1    amiodarone 100 mg tablet Take 100 mg by mouth daily      amLODIPine (NORVASC) 5 mg tablet Take 5 mg by mouth daily  5    apixaban (ELIQUIS) 5 mg Take 5 mg by mouth 2 (two) times a day        atorvastatin (LIPITOR) 80 mg tablet Take 80 mg by mouth Taking half a tablet a day (40mg) daily      B-D ULTRAFINE III SHORT PEN 31G X 8 MM MISC Inject under the skin 4 (four) times a day 200 each 5    BD Pen Needle Stephani 2nd Gen 32G X 4 MM MISC USE 1 TO INJECT INSULIN 4 TIMES DAILY      Blood Glucose Monitoring Suppl (ACCU-CHEK GUIDE ME) w/Device KIT Test once or twice daily, e11.9 1 kit 0    Cholecalciferol 25 MCG (1000 UT) tablet Take 2,000 Units by mouth daily      CRANBERRY PO Take by mouth      furosemide (LASIX) 40 mg tablet Take 40 mg by mouth      glucose blood (ACCU-CHEK MONICA PLUS) test strip 1 each by Other route 2 (two) times a day Use as instructed 100 each 5    glucose blood test strip Accu-chek Monica Plus meter. insulin aspart (NovoLOG FlexPen) 100 UNIT/ML injection pen 15 Units 3 (three) times a day with meals      insulin glargine (Lantus SoloStar) 100 units/mL injection pen Inject 30 Units under the skin daily (Patient taking differently: Inject 60 Units under the skin daily) 5 pen 3    insulin lispro (HumaLOG KwikPen) 100 units/mL injection pen Inject 5 Units under the skin 3 (three) times a day with meals 5 pen 2    Lancet Devices (ACCU-CHEK SOFTCLIX) lancets To be used B. I.D 1 each 5    Lancets (ACCU-CHEK SOFT TOUCH) lancets Test once or twice daily, e11.9 accu check guide 100 each 5    levothyroxine 50 mcg tablet TAKE 1 TABLET BY MOUTH ONCE DAILY IN THE EARLY MORNING 90 tablet 3    lisinopril (ZESTRIL) 20 mg tablet Take 20 mg by mouth sertraline (ZOLOFT) 50 mg tablet TAKE 1 & 1/2 (ONE & ONE-HALF) TABLETS BY MOUTH ONCE DAILY 45 tablet 0    Trulicity 3 MT/3.5AQ injection Inject 3 mg under the skin in the morning      benzonatate (TESSALON PERLES) 100 mg capsule Take 1 capsule (100 mg total) by mouth 3 (three) times a day as needed for cough (Patient not taking: Reported on 11/2/2023) 20 capsule 0    metoprolol tartrate (LOPRESSOR) 50 mg tablet Take 25 mg by mouth 2 (two) times a day   (Patient not taking: Reported on 5/1/2023)       No current facility-administered medications for this visit.          Health Maintenance     Health Maintenance   Topic Date Due    Cervical Cancer Screening  Never done    Breast Cancer Screening: Mammogram  Never done    Colorectal Cancer Screening  Never done    DM Eye Exam  08/08/2017    COVID-19 Vaccine (3 - Moderna series) 07/03/2021    HEMOGLOBIN A1C  11/12/2023    Annual Physical  12/09/2023    Diabetic Foot Exam  12/12/2023    BMI: Followup Plan  05/02/2024    Hepatitis C Screening  01/02/2024 (Originally 1960)    HIV Screening  06/10/2024 (Originally 7/22/1975)    Kidney Health Evaluation: Albumin/Creatinine Ratio  05/01/2024    Kidney Health Evaluation: GFR  05/12/2024    BMI: Adult  11/02/2024    DTaP,Tdap,and Td Vaccines (3 - Td or Tdap) 12/09/2032    Pneumococcal Vaccine: Pediatrics (0 to 5 Years) and At-Risk Patients (6 to 59 Years)  Completed    Influenza Vaccine  Completed    HIB Vaccine  Aged Out    IPV Vaccine  Aged Out    Hepatitis A Vaccine  Aged Out    Meningococcal ACWY Vaccine  Aged Out    HPV Vaccine  Aged Out     Immunization History   Administered Date(s) Administered    COVID-19 MODERNA VACC 0.5 ML IM 04/10/2021, 05/08/2021    Fluzone Split Quad 0.25 mL 10/29/2018    INFLUENZA 01/13/2018, 10/29/2018, 10/31/2020, 12/03/2021, 11/18/2022, 10/19/2023    Influenza, injectable, quadrivalent, preservative free 0.5 mL 11/13/2019    Pneumococcal Conjugate Vaccine 20-valent (Pcv20), Polysace 12/09/2022, 12/09/2022    Pneumococcal Polysaccharide PPV23 08/01/2010, 09/30/2013    Tdap 12/09/2022, 12/09/2022    Zoster 09/28/2018    Zoster Vaccine Recombinant 09/28/2018, 02/16/2019        Patient's shoes and socks removed. Right Foot/Ankle   Right Foot Inspection  Skin Exam: skin normal and skin intact. No dry skin, no warmth, no callus, no erythema, no maceration, no abnormal color, no pre-ulcer, no ulcer and no callus. Toe Exam: ROM and strength within normal limits. Sensory   Vibration: intact  Proprioception: intact  Monofilament testing: intact    Vascular  Capillary refills: < 3 seconds  The right DP pulse is 2+. Left Foot/Ankle  Left Foot Inspection  Skin Exam: skin normal and skin intact. No dry skin, no warmth, no erythema, no maceration, normal color, no pre-ulcer, no ulcer and no callus. Toe Exam: ROM and strength within normal limits. Sensory   Vibration: intact  Proprioception: intact  Monofilament testing: intact    Vascular  Capillary refills: < 3 seconds  The left DP pulse is 2+.      Assign Risk Category  No deformity present  No loss of protective sensation  No weak pulses  Risk: [place], CRNP

## 2023-11-12 DIAGNOSIS — F32.A ANXIETY AND DEPRESSION: ICD-10-CM

## 2023-11-12 DIAGNOSIS — F41.9 ANXIETY AND DEPRESSION: ICD-10-CM

## 2024-02-21 ENCOUNTER — TELEPHONE (OUTPATIENT)
Dept: ADMINISTRATIVE | Facility: OTHER | Age: 64
End: 2024-02-21

## 2024-02-21 NOTE — TELEPHONE ENCOUNTER
----- Message from Natasha Lui LPN sent at 2/20/2024  3:42 PM EST -----  Regarding: care gap request  02/20/24 3:42 PM    Hello, our patient attached above has had Hepatitis C completed/performed. Please assist in updating the patient chart by pulling the Care Everywhere (CE) document. The date of service is 10/29/2018.     Thank you,  Natasha REYNOLDS FP

## 2024-02-21 NOTE — TELEPHONE ENCOUNTER
Upon review of the In Basket request we were able to locate, review, and update the patient chart as requested for Hepatitis C .    Any additional questions or concerns should be emailed to the Practice Liaisons via the appropriate education email address, please do not reply via In Basket.    Thank you  Breezy Metzger MA

## 2024-03-04 DIAGNOSIS — F41.9 ANXIETY AND DEPRESSION: ICD-10-CM

## 2024-03-04 DIAGNOSIS — F32.A ANXIETY AND DEPRESSION: ICD-10-CM

## 2024-03-07 ENCOUNTER — OFFICE VISIT (OUTPATIENT)
Dept: FAMILY MEDICINE CLINIC | Facility: CLINIC | Age: 64
End: 2024-03-07
Payer: COMMERCIAL

## 2024-03-07 VITALS
TEMPERATURE: 98.6 F | HEART RATE: 100 BPM | HEIGHT: 63 IN | OXYGEN SATURATION: 94 % | SYSTOLIC BLOOD PRESSURE: 138 MMHG | DIASTOLIC BLOOD PRESSURE: 84 MMHG | BODY MASS INDEX: 41.82 KG/M2 | RESPIRATION RATE: 17 BRPM | WEIGHT: 236 LBS

## 2024-03-07 DIAGNOSIS — Z12.31 ENCOUNTER FOR SCREENING MAMMOGRAM FOR MALIGNANT NEOPLASM OF BREAST: ICD-10-CM

## 2024-03-07 DIAGNOSIS — Z00.00 ANNUAL PHYSICAL EXAM: Primary | ICD-10-CM

## 2024-03-07 PROCEDURE — 99396 PREV VISIT EST AGE 40-64: CPT | Performed by: NURSE PRACTITIONER

## 2024-03-07 NOTE — PROGRESS NOTES
ADULT ANNUAL PHYSICAL  [facility]    NAME: Marisabel Reinoso  AGE: 63 y.o. SEX: female  : 1960     DATE: 3/7/2024     Assessment and Plan:     Problem List Items Addressed This Visit    None  Visit Diagnoses       Annual physical exam    -  Primary    Encounter for screening mammogram for malignant neoplasm of breast        Relevant Orders    Mammo screening bilateral w 3d & cad          Declines cologuard and colonoscopy  May do mammogram      Immunizations and preventive care screenings were discussed with patient today. Appropriate education was printed on patient's after visit summary.    Counseling:  Alcohol/drug use: discussed moderation in alcohol intake, the recommendations for healthy alcohol use, and avoidance of illicit drug use.  Dental Health: discussed importance of regular tooth brushing, flossing, and dental visits.  Injury prevention: discussed safety/seat belts, safety helmets, smoke detectors, carbon dioxide detectors, and smoking near bedding or upholstery.  Sexual health: discussed sexually transmitted diseases, partner selection, use of condoms, avoidance of unintended pregnancy, and contraceptive alternatives.  Exercise: the importance of regular exercise/physical activity was discussed. Recommend exercise 3-5 times per week for at least 30 minutes.       Depression Screening and Follow-up Plan: Patient was screened for depression during today's encounter. They screened negative with a PHQ-9 score of 2.        Return in about 6 months (around 2024) for Recheck.     Chief Complaint:     Chief Complaint   Patient presents with    Physical Exam     Pt been seeing for Physical Exam      History of Present Illness:     Adult Annual Physical   Patient here for a comprehensive physical exam. The patient reports problems - cold type symptoms .  Started 3 days ago  Cough, congestion, no fevers, ha, no ear pain  Exposed to sick  grandchildren  Labs reviewed    Diet and Physical Activity  Diet/Nutrition: well balanced diet.   Exercise: no formal exercise.      Depression Screening  PHQ-2/9 Depression Screening    Little interest or pleasure in doing things: 0 - not at all  Feeling down, depressed, or hopeless: 0 - not at all  Trouble falling or staying asleep, or sleeping too much: 1 - several days  Feeling tired or having little energy: 1 - several days  Poor appetite or overeatin - not at all  Feeling bad about yourself - or that you are a failure or have let yourself or your family down: 0 - not at all  Trouble concentrating on things, such as reading the newspaper or watching television: 0 - not at all  Moving or speaking so slowly that other people could have noticed. Or the opposite - being so fidgety or restless that you have been moving around a lot more than usual: 0 - not at all  Thoughts that you would be better off dead, or of hurting yourself in some way: 0 - not at all  PHQ-9 Score: 2  PHQ-9 Interpretation: No or Minimal depression       General Health  Sleep: sleeps well.   Hearing: normal - bilateral.  Vision: no vision problems.   Dental: regular dental visits.       /GYN Health  Follows with gynecology? yes   Patient is: postmenopausal  .    Advanced Care Planning  Do you have an advanced directive? no  Do you have a durable medical power of ? no  ACP document given to the patient? no     Review of Systems:     Review of Systems   Constitutional:  Positive for chills and fatigue.   HENT:  Positive for congestion, postnasal drip, rhinorrhea, sneezing and sore throat. Negative for ear pain.    Eyes:  Negative for photophobia and visual disturbance.   Respiratory:  Positive for cough. Negative for chest tightness, shortness of breath and wheezing.    Cardiovascular:  Negative for chest pain and palpitations.   Gastrointestinal:  Negative for constipation, diarrhea, nausea and vomiting.   Endocrine: Negative for  polydipsia, polyphagia and polyuria.   Genitourinary:  Negative for dysuria and frequency.   Musculoskeletal:  Negative for arthralgias and myalgias.   Skin:  Negative for rash.   Neurological:  Positive for headaches. Negative for dizziness and light-headedness.   Hematological:  Negative for adenopathy.   Psychiatric/Behavioral:  Negative for dysphoric mood and sleep disturbance. The patient is not nervous/anxious.       Past Medical History:     Past Medical History:   Diagnosis Date    Atrial fibrillation (HCC)     CHF (congestive heart failure) (HCC)     Diabetes (HCC)     Necrotizing fasciitis (HCC)       Past Surgical History:     Past Surgical History:   Procedure Laterality Date    CARDIAC ELECTROPHYSIOLOGY MAPPING AND ABLATION      EXCESSIVE THIGH / HIP / BUTTOCK / FLANK SKIN EXCISION Right     necrotizing fascitis removal- right hip    HYSTERECTOMY  1996      Social History:     Social History     Socioeconomic History    Marital status:      Spouse name: None    Number of children: None    Years of education: None    Highest education level: None   Occupational History    None   Tobacco Use    Smoking status: Former     Current packs/day: 0.00     Types: Cigarettes     Quit date: 1/1/2014     Years since quitting: 10.1     Passive exposure: Never    Smokeless tobacco: Never   Vaping Use    Vaping status: Never Used   Substance and Sexual Activity    Alcohol use: Yes     Comment: Rarely consumes     Drug use: Never    Sexual activity: Yes     Partners: Male     Birth control/protection: Surgical   Other Topics Concern    None   Social History Narrative    None     Social Determinants of Health     Financial Resource Strain: Not on file   Food Insecurity: Not on file   Transportation Needs: Not on file   Physical Activity: Not on file   Stress: Not on file   Social Connections: Not on file   Intimate Partner Violence: Not on file   Housing Stability: Not on file      Family History:     Family  History   Problem Relation Age of Onset    Hypertension Mother     Coronary artery disease Father     Diabetes Father     Hypertension Father       Current Medications:     Current Outpatient Medications   Medication Sig Dispense Refill    acetaminophen (TYLENOL) 500 mg tablet Take 500 mg by mouth every 6 (six) hours as needed      albuterol (ProAir HFA) 90 mcg/act inhaler Inhale 2 puffs every 6 (six) hours as needed for wheezing or shortness of breath 8.5 g 1    amLODIPine (NORVASC) 5 mg tablet Take 5 mg by mouth daily  5    apixaban (ELIQUIS) 5 mg Take 5 mg by mouth 2 (two) times a day        atorvastatin (LIPITOR) 80 mg tablet Take 80 mg by mouth Taking half a tablet a day (40mg) daily      B-D ULTRAFINE III SHORT PEN 31G X 8 MM MISC Inject under the skin 4 (four) times a day 200 each 5    BD Pen Needle Stephani 2nd Gen 32G X 4 MM MISC USE 1 TO INJECT INSULIN 4 TIMES DAILY      Blood Glucose Monitoring Suppl (ACCU-CHEK GUIDE ME) w/Device KIT Test once or twice daily, e11.9 1 kit 0    Cholecalciferol 25 MCG (1000 UT) tablet Take 2,000 Units by mouth daily      CRANBERRY PO Take by mouth      furosemide (LASIX) 40 mg tablet Take 40 mg by mouth      glucose blood (ACCU-CHEK MONICA PLUS) test strip 1 each by Other route 2 (two) times a day Use as instructed 100 each 5    glucose blood test strip Accu-chek Monica Plus meter.      insulin aspart (NovoLOG FlexPen) 100 UNIT/ML injection pen 15 Units 3 (three) times a day with meals      insulin glargine (Lantus SoloStar) 100 units/mL injection pen Inject 30 Units under the skin daily (Patient taking differently: Inject 60 Units under the skin daily) 5 pen 3    insulin lispro (HumaLOG KwikPen) 100 units/mL injection pen Inject 5 Units under the skin 3 (three) times a day with meals 5 pen 2    Lancet Devices (ACCU-CHEK SOFTCLIX) lancets To be used B.I.D 1 each 5    Lancets (ACCU-CHEK SOFT TOUCH) lancets Test once or twice daily, e11.9 accu check guide 100 each 5    levothyroxine  "50 mcg tablet TAKE 1 TABLET BY MOUTH ONCE DAILY IN THE EARLY MORNING 90 tablet 3    lisinopril (ZESTRIL) 20 mg tablet Take 20 mg by mouth      sertraline (ZOLOFT) 50 mg tablet TAKE 1 & 1/2 (ONE & ONE-HALF) TABLETS BY MOUTH ONCE DAILY 45 tablet 5    Trulicity 3 MG/0.5ML injection Inject 3 mg under the skin in the morning      amiodarone 100 mg tablet Take 100 mg by mouth daily      benzonatate (TESSALON PERLES) 100 mg capsule Take 1 capsule (100 mg total) by mouth 3 (three) times a day as needed for cough (Patient not taking: Reported on 11/2/2023) 20 capsule 0    metoprolol tartrate (LOPRESSOR) 50 mg tablet Take 25 mg by mouth 2 (two) times a day   (Patient not taking: Reported on 5/1/2023)       No current facility-administered medications for this visit.      Allergies:     Allergies   Allergen Reactions    Skin Protectants, Misc. Dermatitis     Reports developing weeping dermatitis from topical use of Coloplast TRIAD Paste      Physical Exam:     /84 (BP Location: Left arm, Patient Position: Sitting, Cuff Size: Large)   Pulse 100   Temp 98.6 °F (37 °C) (Tympanic)   Resp 17   Ht 5' 3.07\" (1.602 m)   Wt 107 kg (236 lb)   LMP  (LMP Unknown)   SpO2 94%   BMI 41.71 kg/m²     Physical Exam  Vitals and nursing note reviewed.   Constitutional:       Appearance: Normal appearance.   HENT:      Head: Normocephalic and atraumatic.      Right Ear: Tympanic membrane, ear canal and external ear normal.      Left Ear: Tympanic membrane, ear canal and external ear normal.      Nose: Congestion and rhinorrhea present.      Mouth/Throat:      Pharynx: Posterior oropharyngeal erythema present.   Eyes:      Conjunctiva/sclera: Conjunctivae normal.   Cardiovascular:      Rate and Rhythm: Normal rate and regular rhythm.      Heart sounds: Normal heart sounds.   Pulmonary:      Effort: Pulmonary effort is normal.      Breath sounds: Normal breath sounds.   Abdominal:      General: Bowel sounds are normal. "   Musculoskeletal:         General: Normal range of motion.      Cervical back: Normal range of motion and neck supple.   Skin:     General: Skin is warm.      Capillary Refill: Capillary refill takes less than 2 seconds.   Neurological:      General: No focal deficit present.      Mental Status: She is alert and oriented to person, place, and time.   Psychiatric:         Mood and Affect: Mood normal.         Behavior: Behavior normal.         Thought Content: Thought content normal.         Judgment: Judgment normal.          GIANLUCA De Oliveira [facility]

## 2024-03-16 DIAGNOSIS — E03.9 ACQUIRED HYPOTHYROIDISM: ICD-10-CM

## 2024-03-16 RX ORDER — LEVOTHYROXINE SODIUM 0.05 MG/1
50 TABLET ORAL EVERY MORNING
Qty: 90 TABLET | Refills: 0 | Status: SHIPPED | OUTPATIENT
Start: 2024-03-16

## 2024-03-21 LAB — HBA1C MFR BLD HPLC: 8.9 %

## 2024-06-12 DIAGNOSIS — E03.9 ACQUIRED HYPOTHYROIDISM: ICD-10-CM

## 2024-06-12 RX ORDER — LEVOTHYROXINE SODIUM 0.05 MG/1
50 TABLET ORAL EVERY MORNING
Qty: 90 TABLET | Refills: 1 | Status: SHIPPED | OUTPATIENT
Start: 2024-06-12

## 2024-06-19 ENCOUNTER — TELEPHONE (OUTPATIENT)
Dept: ADMINISTRATIVE | Facility: OTHER | Age: 64
End: 2024-06-19

## 2024-06-19 NOTE — TELEPHONE ENCOUNTER
----- Message from Natasha HURTADO sent at 6/19/2024  9:29 AM EDT -----  Regarding: care gap request  06/19/24 9:29 AM    Hello, our patient attached above has had Hemoglobin A1c completed/performed. Please assist in updating the patient chart by pulling the Care Everywhere (CE) document. The date of service is 3/21/2024.     Thank you,  Natasha REYNOLDS FP

## 2024-06-19 NOTE — TELEPHONE ENCOUNTER
----- Message from Natasha HURTADO sent at 6/19/2024  9:31 AM EDT -----  Regarding: care gap request  06/19/24 9:31 AM    Hello, our patient attached above has had HIV completed/performed. Please assist in updating the patient chart by pulling the Care Everywhere (CE) document. The date of service is 12/30/2015.     Thank you,  Natasha MENDOZA

## 2024-06-20 NOTE — TELEPHONE ENCOUNTER
Upon review of the In Basket request we were able to locate, review, and update the patient chart as requested for Hemoglobin A1c and HIV.    Any additional questions or concerns should be emailed to the Practice Liaisons via the appropriate education email address, please do not reply via In Basket.    Thank you  Sadaf Aponte   [facility]

## 2024-08-06 LAB
LEFT EYE DIABETIC RETINOPATHY: NORMAL
RIGHT EYE DIABETIC RETINOPATHY: NORMAL

## 2024-08-30 ENCOUNTER — VBI (OUTPATIENT)
Dept: ADMINISTRATIVE | Facility: OTHER | Age: 64
End: 2024-08-30

## 2024-08-30 NOTE — TELEPHONE ENCOUNTER
08/30/24 7:45 AM     Chart reviewed for CRC: Colonoscopy ; nothing is submitted to the patient's insurance at this time.     Daylin Russo   [facility]

## 2024-09-05 ENCOUNTER — OFFICE VISIT (OUTPATIENT)
Dept: FAMILY MEDICINE CLINIC | Facility: CLINIC | Age: 64
End: 2024-09-05
Payer: COMMERCIAL

## 2024-09-05 VITALS
HEIGHT: 63 IN | BODY MASS INDEX: 42.17 KG/M2 | OXYGEN SATURATION: 97 % | HEART RATE: 83 BPM | DIASTOLIC BLOOD PRESSURE: 70 MMHG | TEMPERATURE: 99.7 F | RESPIRATION RATE: 17 BRPM | WEIGHT: 238 LBS | SYSTOLIC BLOOD PRESSURE: 130 MMHG

## 2024-09-05 DIAGNOSIS — E03.9 ACQUIRED HYPOTHYROIDISM: ICD-10-CM

## 2024-09-05 DIAGNOSIS — Z79.4 TYPE 2 DIABETES MELLITUS WITH OTHER CIRCULATORY COMPLICATION, WITH LONG-TERM CURRENT USE OF INSULIN (HCC): Primary | ICD-10-CM

## 2024-09-05 DIAGNOSIS — E78.5 DYSLIPIDEMIA: ICD-10-CM

## 2024-09-05 DIAGNOSIS — R21 RASH: ICD-10-CM

## 2024-09-05 DIAGNOSIS — E11.59 TYPE 2 DIABETES MELLITUS WITH OTHER CIRCULATORY COMPLICATION, WITH LONG-TERM CURRENT USE OF INSULIN (HCC): Primary | ICD-10-CM

## 2024-09-05 DIAGNOSIS — I10 ESSENTIAL HYPERTENSION: ICD-10-CM

## 2024-09-05 LAB
CREAT UR-MCNC: 124.2 MG/DL
MICROALBUMIN UR-MCNC: 52.4 MG/L
MICROALBUMIN/CREAT 24H UR: 42 MG/G CREATININE (ref 0–30)
SL AMB POCT HEMOGLOBIN AIC: 8.5 (ref ?–6.5)

## 2024-09-05 PROCEDURE — 82570 ASSAY OF URINE CREATININE: CPT | Performed by: NURSE PRACTITIONER

## 2024-09-05 PROCEDURE — 82043 UR ALBUMIN QUANTITATIVE: CPT | Performed by: NURSE PRACTITIONER

## 2024-09-05 PROCEDURE — 99214 OFFICE O/P EST MOD 30 MIN: CPT | Performed by: NURSE PRACTITIONER

## 2024-09-05 PROCEDURE — 83036 HEMOGLOBIN GLYCOSYLATED A1C: CPT | Performed by: NURSE PRACTITIONER

## 2024-09-05 RX ORDER — SEMAGLUTIDE 1.34 MG/ML
INJECTION, SOLUTION SUBCUTANEOUS
COMMUNITY
Start: 2024-09-04

## 2024-09-05 RX ORDER — AMMONIUM LACTATE 12 G/100G
LOTION TOPICAL 2 TIMES DAILY PRN
Qty: 225 G | Refills: 1 | Status: SHIPPED | OUTPATIENT
Start: 2024-09-05

## 2024-09-05 RX ORDER — LOSARTAN POTASSIUM 50 MG/1
50 TABLET ORAL DAILY
COMMUNITY

## 2024-09-05 NOTE — PROGRESS NOTES
FAMILY PRACTICE OFFICE VISIT       NAME: Marisabel Reinoso  AGE: 64 y.o. SEX: female       : 1960        MRN: [id number]    DATE: 2024  TIME: 2:06 AM    Assessment and Plan   1. Type 2 diabetes mellitus with other circulatory complication, with long-term current use of insulin (HCC)  Assessment & Plan:  Patient following with endocrinology  Needs to follow ada diet  Cont oxempic,lantus  Lab Results   Component Value Date    HGBA1C 8.5 (A) 2024     Orders:  -     POCT hemoglobin A1c  -     Comprehensive metabolic panel; Future  -     CBC and differential; Future  -     TSH, 3rd generation with Free T4 reflex; Future  -     Lipid Panel with Direct LDL reflex; Future  -     Albumin / creatinine urine ratio  -     Albumin / creatinine urine ratio; Future; Expected date: 2025  -     Comprehensive metabolic panel; Future; Expected date: 2025  -     Hemoglobin A1C; Future; Expected date: 2025  -     TSH, 3rd generation with Free T4 reflex; Future; Expected date: 2025  -     Lipid Panel with Direct LDL reflex; Future; Expected date: 2025  2. Essential hypertension  Assessment & Plan:  Stable  Cont losartan, lisinopril and metoprolol  Cont f/u with cardiology    Orders:  -     Comprehensive metabolic panel; Future  -     CBC and differential; Future  -     TSH, 3rd generation with Free T4 reflex; Future  -     Lipid Panel with Direct LDL reflex; Future  -     Albumin / creatinine urine ratio; Future; Expected date: 2025  -     Comprehensive metabolic panel; Future; Expected date: 2025  -     Hemoglobin A1C; Future; Expected date: 2025  -     TSH, 3rd generation with Free T4 reflex; Future; Expected date: 2025  -     Lipid Panel with Direct LDL reflex; Future; Expected date: 2025  3. Acquired hypothyroidism  Assessment & Plan:  Stable  Cont levothyroxine    Orders:  -     Comprehensive metabolic panel; Future  -     CBC and differential; Future  -      TSH, 3rd generation with Free T4 reflex; Future  -     Lipid Panel with Direct LDL reflex; Future  -     Albumin / creatinine urine ratio; Future; Expected date: 01/28/2025  -     Comprehensive metabolic panel; Future; Expected date: 01/28/2025  -     Hemoglobin A1C; Future; Expected date: 01/28/2025  -     TSH, 3rd generation with Free T4 reflex; Future; Expected date: 01/28/2025  -     Lipid Panel with Direct LDL reflex; Future; Expected date: 01/28/2025  4. Dyslipidemia  Assessment & Plan:  Monitor cholesterol  Follow low cholesterol diet  Cont atorvastatin    Orders:  -     Comprehensive metabolic panel; Future  -     CBC and differential; Future  -     TSH, 3rd generation with Free T4 reflex; Future  -     Lipid Panel with Direct LDL reflex; Future  -     Albumin / creatinine urine ratio; Future; Expected date: 01/28/2025  -     Comprehensive metabolic panel; Future; Expected date: 01/28/2025  -     Hemoglobin A1C; Future; Expected date: 01/28/2025  -     TSH, 3rd generation with Free T4 reflex; Future; Expected date: 01/28/2025  -     Lipid Panel with Direct LDL reflex; Future; Expected date: 01/28/2025  5. Rash  -     ammonium lactate (LAC-HYDRIN) 12 % lotion; Apply topically 2 (two) times a day as needed for dry skin  -     Albumin / creatinine urine ratio; Future; Expected date: 01/28/2025  -     Comprehensive metabolic panel; Future; Expected date: 01/28/2025  -     Hemoglobin A1C; Future; Expected date: 01/28/2025  -     TSH, 3rd generation with Free T4 reflex; Future; Expected date: 01/28/2025  -     Lipid Panel with Direct LDL reflex; Future; Expected date: 01/28/2025       Patient Instructions   Patient Education     Type 2 diabetes   The Basics   Written by the doctors and editors at [facility]   What is type 2 diabetes? -- This is a disorder that disrupts the way the body uses sugar. It is sometimes called type 2 diabetes mellitus.  All of the cells in the body need sugar to work normally. Sugar gets  "into the cells with the help of a hormone called insulin. Insulin is made by the pancreas, an organ in the belly. If there is not enough insulin, or if cells in the body don't respond normally to insulin, sugar builds up in the blood. That is what happens to people with diabetes.  There are 2 different types of diabetes:   In type 1 diabetes, the pancreas makes little or no insulin.   In type 2 diabetes, the pancreas still makes some insulin, but the cells in the body stop responding normally. Eventually, the pancreas cannot make enough insulin to keep up.  Having excess body weight or obesity increases a person's risk of developing type 2 diabetes. But people without excess body weight can get diabetes, too.  What are the symptoms of type 2 diabetes? -- Type 2 diabetes usually causes no symptoms. When symptoms do happen, they include:   Needing to urinate often   Intense thirst   Blurry vision  Can diabetes lead to other health problems? -- Yes. Type 2 diabetes might not make you feel sick. But if it is not managed, it can lead to serious problems over time, such as:   Heart attacks   Strokes   Kidney disease   Vision problems (or even blindness)   Pain or loss of feeling in the hands and feet   Needing to have fingers, toes, or other body parts removed (amputated)  How do I know if I have type 2 diabetes? -- Your doctor or nurse can do a blood test. There are 2 tests that can be used for this. Both involve measuring the amount of sugar in your blood, called your \"blood sugar\" or \"blood glucose\":   One of the tests measures your blood sugar at the time the blood sample is taken. This test is done in the morning. You can't eat or drink anything except water for at least 8 hours before the test.   The other test shows what your average blood sugar has been for the past 2 to 3 months. This blood test is called \"hemoglobin A1C\" or just \"A1C.\" It can be checked at any time of the day, even if you have recently " "eaten.  How is type 2 diabetes treated? -- The goals of treatment are to manage your blood sugar and lower the risk of future problems that can happen in people with diabetes.  Treatment might include:   Lifestyle changes - This is an important part of managing diabetes. It includes eating healthy foods and getting plenty of physical activity.   Medicines - There are a few medicines that help lower blood sugar. Some people need to take pills that help the body make more insulin or that help insulin do its job. Others need insulin shots.  Depending on what medicines you take, you might need to check your blood sugar regularly at home. But not everyone with type 2 diabetes needs to do this. Your doctor or nurse will tell you if you should be checking your blood sugar, and when and how to do this.  Sometimes, people with type 2 diabetes also need medicines to help prevent problems caused by the disease. For instance, medicines used to lower blood pressure can reduce the chances of a heart attack or stroke.   General medical care - It's also important to take care of other areas of your health. This includes watching your blood pressure and cholesterol levels. You should also get certain vaccines, such as vaccines to protect against the flu and coronavirus disease 2019 (\"COVID-19\"). Some people also need a vaccine to prevent pneumonia.  Can type 2 diabetes be prevented? -- Yes. To lower your chances of getting type 2 diabetes, the most important thing you can do is eat a healthy diet and get plenty of physical activity. This can help you lose weight if you are overweight. But eating well and being active are also good for your overall health. Even gentle activity, like walking, has benefits.  If you smoke, quitting can also lower your risk of type 2 diabetes. Quitting smoking can be difficult, but your doctor or nurse can help.  All topics are updated as new evidence becomes available and our peer review process is " "complete.  This topic retrieved from [employer] on: Apr 24, 2024.  Topic 78874 Version 23.0  Release: 32.3.2 - C32.113  © 2024 UpToDate, Inc. and/or its affiliates. All rights reserved.  Consumer Information Use and Disclaimer   Disclaimer: This generalized information is a limited summary of diagnosis, treatment, and/or medication information. It is not meant to be comprehensive and should be used as a tool to help the user understand and/or assess potential diagnostic and treatment options. It does NOT include all information about conditions, treatments, medications, side effects, or risks that may apply to a specific patient. It is not intended to be medical advice or a substitute for the medical advice, diagnosis, or treatment of a health care provider based on the health care provider's examination and assessment of a patient's specific and unique circumstances. Patients must speak with a health care provider for complete information about their health, medical questions, and treatment options, including any risks or benefits regarding use of medications. This information does not endorse any treatments or medications as safe, effective, or approved for treating a specific patient. UpToDate, Inc. and its affiliates disclaim any warranty or liability relating to this information or the use thereof.The use of this information is governed by the Terms of Use, available at https://www.elenitersauctionpointuwer.com/en/know/clinical-effectiveness-terms. 2024© UpToDate, Inc. and its affiliates and/or licensors. All rights reserved.  Copyright   © 2024 UpToDate, Inc. and/or its affiliates. All rights reserved.    Patient Education     Foot care for people with diabetes   The Basics   Written by the doctors and editors at [employer]   Why is foot care important if I have diabetes? -- Diabetes can cause nerve damage if your blood sugar is high for a long time. The medical term for this is \"diabetic neuropathy.\"  If you have problems " with the nerves in your feet, you might not be able to feel pain in your foot. Normally, people feel pain when they get a cut or a blister on their foot. The pain tells them that they need to treat their cut so it can heal. But people with nerve damage might not feel any pain when their feet get hurt. They might not even know that they have a cut, so they might not treat it. Problems that aren't treated right away can get much worse. For example, an untreated cut can get infected and turn into an open sore.  High blood sugar can also damage blood vessels and decrease blood flow to your feet. This can weaken your skin and make wounds take longer to heal. You are also more likely to get an infection if you have high blood sugar.  How do I take care of my feet? -- Taking good care of your feet can help prevent foot problems. You should:   Wash your feet every day with soap and warm water. Pat your feet dry, and be sure to dry the skin between your toes.   Keep your feet moisturized. Put lotion on the tops and bottoms of your feet, but not between your toes.   Check your feet every day (figure 1). Look for cuts, blisters, redness, or swelling. Use a mirror, or ask someone to help you check the bottoms of your feet. Check all parts of the foot, especially between the toes. Look for broken skin, ulcers, blisters, or redness.   Trim your toenails straight across when needed (figure 2). Do not cut the corners of your toenails. File rough edges. Do not cut your cuticles. Ask for help if you cannot see well or have problems reaching your feet.   Ask your doctor or nurse to check your feet at each visit. Take your shoes and socks off for these checks.   See a foot care provider (such as a podiatrist) if you have an ingrown toenail, corn, or callus. Do not try to remove corns and calluses yourself.  How do I protect my feet from injury? -- There are several ways to protect your feet. You can:   Wear shoes and socks at all  times, even at home. Do not walk barefoot. Wear swim shoes if you go to the beach or a swimming pool.   Choose shoes that fit well. They should not be not too tight or too loose. Your shoes should have plenty of room for your toes (figure 3). Your doctor might give you a prescription for special shoes. Check to see if they are covered by your insurance.   Check your shoes each time before you put them on to make sure that the lining is smooth. Also check to make sure that there is nothing inside the shoes before putting them on.   Do not wear shoes that expose any part of the foot, like sandals, thongs, or clogs.   Wear cotton socks that fit loosely. Do not wear shoes without socks.   Protect your feet from heat and cold. Test bath water before putting your feet in it to make sure that it is not too hot. Do not walk barefoot on hot ground. Take extra care when going outside in the cold and wear warm socks.  What else should I know? -- You can lower your risk for foot problems by keeping your blood sugar levels as close to your goal as possible. Other things you can do include:   Move your ankles and toes often to help with blood flow. You can wear a support stocking to help with swelling.   Walk often. Regular walking helps blood flow.   If you smoke, try to quit. Your doctor or nurse can help. Smoking causes poor blood flow to your feet and can damage your nerves.  When should I call the doctor? -- Call your doctor or nurse for advice if you have:   A fever of 100.4°F (38°C) or higher, chills, or a wound that will not heal   Swelling, redness, warmth around a wound, a foul smell coming from a wound, or yellowish, greenish, or bloody discharge   Sores or blisters on your feet that hurt more or less than you would expect   Numbness or tingling in your foot or leg   Corns, calluses, blisters, or new sores on your foot   Very dry, scaly, or cracked skin on your feet   Changes in the way your foot joints or arch  look  All topics are updated as new evidence becomes available and our peer review process is complete.  This topic retrieved from [employer] on: Mar 13, 2024.  Topic 202549 Version 2.0  Release: 32.2.4 - C32.71  © 2024 UpToDate, Inc. and/or its affiliates. All rights reserved.  figure 1: Foot check for people with diabetes     People with diabetes should check both of their feet every day. It is important to check your feet all over, including in between your toes. If you can't see the bottom of your foot, use a mirror or ask another person to check for you. Let your doctor or nurse know if you find any:  Redness   Cuts or cracks in the skin   Blisters   Swelling   Graphic 94560 Version 3.0  figure 2: Trim your toenails     Trim your toenails straight across and smooth them with a nail file.  Graphic 30519 Version 2.0  figure 3: Correct shoe shape     Choose shoes that fit the right way and are not too tight or too loose. Your shoes should have plenty of room for your toes.  Graphic 82987 Version 2.0  Consumer Information Use and Disclaimer   Disclaimer: This generalized information is a limited summary of diagnosis, treatment, and/or medication information. It is not meant to be comprehensive and should be used as a tool to help the user understand and/or assess potential diagnostic and treatment options. It does NOT include all information about conditions, treatments, medications, side effects, or risks that may apply to a specific patient. It is not intended to be medical advice or a substitute for the medical advice, diagnosis, or treatment of a health care provider based on the health care provider's examination and assessment of a patient's specific and unique circumstances. Patients must speak with a health care provider for complete information about their health, medical questions, and treatment options, including any risks or benefits regarding use of medications. This information does not endorse any  treatments or medications as safe, effective, or approved for treating a specific patient. UpToDate, Inc. and its affiliates disclaim any warranty or liability relating to this information or the use thereof.The use of this information is governed by the Terms of Use, available at https://www.Beepi.com/en/know/clinical-effectiveness-terms. 2024© UpToDate, Inc. and its affiliates and/or licensors. All rights reserved.  Copyright   © 2024 UpToDate, Inc. and/or its affiliates. All rights reserved.    Patient Education     Type 2 diabetes   The Basics   Written by the doctors and editors at [email or web address]   What is type 2 diabetes? -- This is a disorder that disrupts the way the body uses sugar. It is sometimes called type 2 diabetes mellitus.  All of the cells in the body need sugar to work normally. Sugar gets into the cells with the help of a hormone called insulin. Insulin is made by the pancreas, an organ in the belly. If there is not enough insulin, or if cells in the body don't respond normally to insulin, sugar builds up in the blood. That is what happens to people with diabetes.  There are 2 different types of diabetes:   In type 1 diabetes, the pancreas makes little or no insulin.   In type 2 diabetes, the pancreas still makes some insulin, but the cells in the body stop responding normally. Eventually, the pancreas cannot make enough insulin to keep up.  Having excess body weight or obesity increases a person's risk of developing type 2 diabetes. But people without excess body weight can get diabetes, too.  What are the symptoms of type 2 diabetes? -- Type 2 diabetes usually causes no symptoms. When symptoms do happen, they include:   Needing to urinate often   Intense thirst   Blurry vision  Can diabetes lead to other health problems? -- Yes. Type 2 diabetes might not make you feel sick. But if it is not managed, it can lead to serious problems over time, such as:   Heart attacks   Strokes   Kidney  "disease   Vision problems (or even blindness)   Pain or loss of feeling in the hands and feet   Needing to have fingers, toes, or other body parts removed (amputated)  How do I know if I have type 2 diabetes? -- Your doctor or nurse can do a blood test. There are 2 tests that can be used for this. Both involve measuring the amount of sugar in your blood, called your \"blood sugar\" or \"blood glucose\":   One of the tests measures your blood sugar at the time the blood sample is taken. This test is done in the morning. You can't eat or drink anything except water for at least 8 hours before the test.   The other test shows what your average blood sugar has been for the past 2 to 3 months. This blood test is called \"hemoglobin A1C\" or just \"A1C.\" It can be checked at any time of the day, even if you have recently eaten.  How is type 2 diabetes treated? -- The goals of treatment are to manage your blood sugar and lower the risk of future problems that can happen in people with diabetes.  Treatment might include:   Lifestyle changes - This is an important part of managing diabetes. It includes eating healthy foods and getting plenty of physical activity.   Medicines - There are a few medicines that help lower blood sugar. Some people need to take pills that help the body make more insulin or that help insulin do its job. Others need insulin shots.  Depending on what medicines you take, you might need to check your blood sugar regularly at home. But not everyone with type 2 diabetes needs to do this. Your doctor or nurse will tell you if you should be checking your blood sugar, and when and how to do this.  Sometimes, people with type 2 diabetes also need medicines to help prevent problems caused by the disease. For instance, medicines used to lower blood pressure can reduce the chances of a heart attack or stroke.   General medical care - It's also important to take care of other areas of your health. This includes " "watching your blood pressure and cholesterol levels. You should also get certain vaccines, such as vaccines to protect against the flu and coronavirus disease 2019 (\"COVID-19\"). Some people also need a vaccine to prevent pneumonia.  Can type 2 diabetes be prevented? -- Yes. To lower your chances of getting type 2 diabetes, the most important thing you can do is eat a healthy diet and get plenty of physical activity. This can help you lose weight if you are overweight. But eating well and being active are also good for your overall health. Even gentle activity, like walking, has benefits.  If you smoke, quitting can also lower your risk of type 2 diabetes. Quitting smoking can be difficult, but your doctor or nurse can help.  All topics are updated as new evidence becomes available and our peer review process is complete.  This topic retrieved from [employer] on: Apr 24, 2024.  Topic 62606 Version 23.0  Release: 32.3.2 - C32.113  © 2024 UpToDate, Inc. and/or its affiliates. All rights reserved.  Consumer Information Use and Disclaimer   Disclaimer: This generalized information is a limited summary of diagnosis, treatment, and/or medication information. It is not meant to be comprehensive and should be used as a tool to help the user understand and/or assess potential diagnostic and treatment options. It does NOT include all information about conditions, treatments, medications, side effects, or risks that may apply to a specific patient. It is not intended to be medical advice or a substitute for the medical advice, diagnosis, or treatment of a health care provider based on the health care provider's examination and assessment of a patient's specific and unique circumstances. Patients must speak with a health care provider for complete information about their health, medical questions, and treatment options, including any risks or benefits regarding use of medications. This information does not endorse any treatments " "or medications as safe, effective, or approved for treating a specific patient. UpToDate, Inc. and its affiliates disclaim any warranty or liability relating to this information or the use thereof.The use of this information is governed by the Terms of Use, available at https://www.LiveHive.com/en/know/clinical-effectiveness-terms. 2024© UpToDate, Inc. and its affiliates and/or licensors. All rights reserved.  Copyright   © 2024 UpToDate, Inc. and/or its affiliates. All rights reserved.    Patient Education     Foot care for people with diabetes   The Basics   Written by the doctors and editors at [employer]   Why is foot care important if I have diabetes? -- Diabetes can cause nerve damage if your blood sugar is high for a long time. The medical term for this is \"diabetic neuropathy.\"  If you have problems with the nerves in your feet, you might not be able to feel pain in your foot. Normally, people feel pain when they get a cut or a blister on their foot. The pain tells them that they need to treat their cut so it can heal. But people with nerve damage might not feel any pain when their feet get hurt. They might not even know that they have a cut, so they might not treat it. Problems that aren't treated right away can get much worse. For example, an untreated cut can get infected and turn into an open sore.  High blood sugar can also damage blood vessels and decrease blood flow to your feet. This can weaken your skin and make wounds take longer to heal. You are also more likely to get an infection if you have high blood sugar.  How do I take care of my feet? -- Taking good care of your feet can help prevent foot problems. You should:   Wash your feet every day with soap and warm water. Pat your feet dry, and be sure to dry the skin between your toes.   Keep your feet moisturized. Put lotion on the tops and bottoms of your feet, but not between your toes.   Check your feet every day (figure 1). Look for cuts, " blisters, redness, or swelling. Use a mirror, or ask someone to help you check the bottoms of your feet. Check all parts of the foot, especially between the toes. Look for broken skin, ulcers, blisters, or redness.   Trim your toenails straight across when needed (figure 2). Do not cut the corners of your toenails. File rough edges. Do not cut your cuticles. Ask for help if you cannot see well or have problems reaching your feet.   Ask your doctor or nurse to check your feet at each visit. Take your shoes and socks off for these checks.   See a foot care provider (such as a podiatrist) if you have an ingrown toenail, corn, or callus. Do not try to remove corns and calluses yourself.  How do I protect my feet from injury? -- There are several ways to protect your feet. You can:   Wear shoes and socks at all times, even at home. Do not walk barefoot. Wear swim shoes if you go to the beach or a swimming pool.   Choose shoes that fit well. They should not be not too tight or too loose. Your shoes should have plenty of room for your toes (figure 3). Your doctor might give you a prescription for special shoes. Check to see if they are covered by your insurance.   Check your shoes each time before you put them on to make sure that the lining is smooth. Also check to make sure that there is nothing inside the shoes before putting them on.   Do not wear shoes that expose any part of the foot, like sandals, thongs, or clogs.   Wear cotton socks that fit loosely. Do not wear shoes without socks.   Protect your feet from heat and cold. Test bath water before putting your feet in it to make sure that it is not too hot. Do not walk barefoot on hot ground. Take extra care when going outside in the cold and wear warm socks.  What else should I know? -- You can lower your risk for foot problems by keeping your blood sugar levels as close to your goal as possible. Other things you can do include:   Move your ankles and toes often to  help with blood flow. You can wear a support stocking to help with swelling.   Walk often. Regular walking helps blood flow.   If you smoke, try to quit. Your doctor or nurse can help. Smoking causes poor blood flow to your feet and can damage your nerves.  When should I call the doctor? -- Call your doctor or nurse for advice if you have:   A fever of 100.4°F (38°C) or higher, chills, or a wound that will not heal   Swelling, redness, warmth around a wound, a foul smell coming from a wound, or yellowish, greenish, or bloody discharge   Sores or blisters on your feet that hurt more or less than you would expect   Numbness or tingling in your foot or leg   Corns, calluses, blisters, or new sores on your foot   Very dry, scaly, or cracked skin on your feet   Changes in the way your foot joints or arch look  All topics are updated as new evidence becomes available and our peer review process is complete.  This topic retrieved from [employer] on: Mar 13, 2024.  Topic 394575 Version 2.0  Release: 32.2.4 - C32.71  © 2024 UpToDate, Inc. and/or its affiliates. All rights reserved.  figure 1: Foot check for people with diabetes     People with diabetes should check both of their feet every day. It is important to check your feet all over, including in between your toes. If you can't see the bottom of your foot, use a mirror or ask another person to check for you. Let your doctor or nurse know if you find any:  Redness   Cuts or cracks in the skin   Blisters   Swelling   Graphic 18377 Version 3.0  figure 2: Trim your toenails     Trim your toenails straight across and smooth them with a nail file.  Graphic 28908 Version 2.0  figure 3: Correct shoe shape     Choose shoes that fit the right way and are not too tight or too loose. Your shoes should have plenty of room for your toes.  Graphic 64152 Version 2.0  Consumer Information Use and Disclaimer   Disclaimer: This generalized information is a limited summary of diagnosis,  treatment, and/or medication information. It is not meant to be comprehensive and should be used as a tool to help the user understand and/or assess potential diagnostic and treatment options. It does NOT include all information about conditions, treatments, medications, side effects, or risks that may apply to a specific patient. It is not intended to be medical advice or a substitute for the medical advice, diagnosis, or treatment of a health care provider based on the health care provider's examination and assessment of a patient's specific and unique circumstances. Patients must speak with a health care provider for complete information about their health, medical questions, and treatment options, including any risks or benefits regarding use of medications. This information does not endorse any treatments or medications as safe, effective, or approved for treating a specific patient. UpToDate, Inc. and its affiliates disclaim any warranty or liability relating to this information or the use thereof.The use of this information is governed by the Terms of Use, available at https://www.[employer].com/en/know/clinical-effectiveness-terms. 2024© UpToDate, Inc. and its affiliates and/or licensors. All rights reserved.  Copyright   © 2024 UpToDate, Inc. and/or its affiliates. All rights reserved.           Chief Complaint     Chief Complaint   Patient presents with    Follow-up     Pt being seen for follow up        History of Present Illness   Marisabel Reinoso is a 64 y.o.-year-old female who is here for f/u to chronic medical conditions  Feeling well overall  Due for labs  Continues to follow with endo, cardiology,     Review of Systems   Review of Systems   Constitutional:  Negative for fatigue and fever.   HENT:  Negative for congestion, postnasal drip and rhinorrhea.    Eyes:  Negative for photophobia and visual disturbance.   Respiratory:  Negative for cough, shortness of breath and wheezing.    Cardiovascular:   Negative for chest pain and palpitations.   Gastrointestinal:  Negative for constipation, diarrhea, nausea and vomiting.   Genitourinary:  Negative for dysuria and frequency.   Musculoskeletal:  Negative for arthralgias and myalgias.   Skin:  Negative for rash.   Neurological:  Negative for dizziness, light-headedness and headaches.   Hematological:  Negative for adenopathy.   Psychiatric/Behavioral:  Negative for decreased concentration and sleep disturbance. The patient is not nervous/anxious.        Active Problem List     Patient Active Problem List   Diagnosis    Chronic anticoagulation    Chronic diastolic heart failure (HCC)    Diabetes mellitus with circulatory complication (HCC)    Essential hypertension    Mild obstructive sleep apnea    On amiodarone therapy    Vibration sensory loss    Class 3 severe obesity due to excess calories with serious comorbidity and body mass index (BMI) of 40.0 to 44.9 in adult (HCC)    Dyslipidemia    High risk medication use    History of atrial fibrillation    Anxiety and depression    Nephrolithiasis    Acquired hypothyroidism    Atypical atrial flutter (HCC)    S/P placement of cardiac pacemaker    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)    Vitamin D deficiency    Biventricular cardiac pacemaker in situ    Persistent atrial fibrillation (HCC)    Rash         Past Medical History:  Past Medical History:   Diagnosis Date    Atrial fibrillation (HCC)     CHF (congestive heart failure) (HCC)     Diabetes (HCC)     Necrotizing fasciitis (HCC)        Past Surgical History:  Past Surgical History:   Procedure Laterality Date    CARDIAC ELECTROPHYSIOLOGY MAPPING AND ABLATION      EXCESSIVE THIGH / HIP / BUTTOCK / FLANK SKIN EXCISION Right     necrotizing fascitis removal- right hip    HYSTERECTOMY  1996       Family History:  Family History   Problem Relation Age of Onset    Hypertension Mother     Coronary artery disease Father     Diabetes Father     Hypertension  Father        Social History:  Social History     Socioeconomic History    Marital status:      Spouse name: Not on file    Number of children: Not on file    Years of education: Not on file    Highest education level: Not on file   Occupational History    Not on file   Tobacco Use    Smoking status: Former     Current packs/day: 0.00     Types: Cigarettes     Quit date: 1/1/2014     Years since quitting: 10.7     Passive exposure: Never    Smokeless tobacco: Never   Vaping Use    Vaping status: Never Used   Substance and Sexual Activity    Alcohol use: Yes     Comment: Rarely consumes     Drug use: Never    Sexual activity: Yes     Partners: Male     Birth control/protection: Surgical   Other Topics Concern    Not on file   Social History Narrative    Not on file     Social Determinants of Health     Financial Resource Strain: Not on file   Food Insecurity: Not on file   Transportation Needs: Not on file   Physical Activity: Not on file   Stress: Not on file   Social Connections: Not on file   Intimate Partner Violence: Not on file   Housing Stability: Not on file       Objective     Vitals:    09/05/24 1346   BP: 130/70   Pulse: 83   Resp: 17   Temp: 99.7 °F (37.6 °C)   SpO2: 97%     Wt Readings from Last 3 Encounters:   09/05/24 108 kg (238 lb)   03/07/24 107 kg (236 lb)   11/02/23 105 kg (231 lb 9.6 oz)       Physical Exam  Vitals and nursing note reviewed.   Constitutional:       Appearance: Normal appearance.   HENT:      Head: Normocephalic and atraumatic.      Right Ear: Tympanic membrane, ear canal and external ear normal.      Left Ear: Tympanic membrane, ear canal and external ear normal.      Nose: Nose normal.      Mouth/Throat:      Mouth: Mucous membranes are moist.   Eyes:      Extraocular Movements: Extraocular movements intact.      Conjunctiva/sclera: Conjunctivae normal.   Cardiovascular:      Rate and Rhythm: Normal rate and regular rhythm.      Pulses: Normal pulses. no weak pulses.     "       Dorsalis pedis pulses are 2+ on the right side and 2+ on the left side.      Heart sounds: Normal heart sounds.   Pulmonary:      Effort: Pulmonary effort is normal.      Breath sounds: Normal breath sounds.   Abdominal:      General: Bowel sounds are normal.      Palpations: Abdomen is soft.   Musculoskeletal:         General: Normal range of motion.      Cervical back: Normal range of motion and neck supple.   Feet:      Right foot:      Skin integrity: Dry skin present. No ulcer, skin breakdown, erythema, warmth or callus.      Left foot:      Skin integrity: Dry skin present. No ulcer, skin breakdown, erythema, warmth or callus.   Skin:     General: Skin is warm and dry.      Capillary Refill: Capillary refill takes less than 2 seconds.   Neurological:      General: No focal deficit present.      Mental Status: She is alert and oriented to person, place, and time.   Psychiatric:         Mood and Affect: Mood normal.         Behavior: Behavior normal.         Thought Content: Thought content normal.         Judgment: Judgment normal.         Pertinent Laboratory/Diagnostic Studies:  Lab Results   Component Value Date    BUN 20 02/22/2024    CREATININE 1.09 02/22/2024    CALCIUM 9.0 02/22/2024    K 3.7 02/22/2024    CO2 22 02/22/2024     02/22/2024     Lab Results   Component Value Date    ALT 22 02/22/2024    AST 21 02/22/2024    ALKPHOS 89 02/22/2024       Lab Results   Component Value Date    WBC 10.36 (H) 05/12/2023    HGB 12.9 05/12/2023    HCT 40.9 05/12/2023     (H) 05/12/2023     (H) 05/12/2023       Lab Results   Component Value Date    TSH 2.20 02/22/2024       No results found for: \"CHOL\"  Lab Results   Component Value Date    TRIG 102 05/12/2023     Lab Results   Component Value Date    HDL 44 (L) 05/12/2023     Lab Results   Component Value Date    LDLCALC 31 05/12/2023     Lab Results   Component Value Date    HGBA1C 8.5 (A) 09/05/2024       Results for orders placed or " performed in visit on 09/05/24   POCT hemoglobin A1c    Collection Time: 09/05/24  1:56 PM   Result Value Ref Range    Hemoglobin A1C 8.5 (A) 6.5   Albumin / creatinine urine ratio    Collection Time: 09/05/24  2:32 PM   Result Value Ref Range    Creatinine, Ur 124.2 Reference range not established. mg/dL    Albumin,U,Random 52.4 (H) <20.0 mg/L    Albumin Creat Ratio 42 (H) 0 - 30 mg/g creatinine       Orders Placed This Encounter   Procedures    Comprehensive metabolic panel    CBC and differential    TSH, 3rd generation with Free T4 reflex    Lipid Panel with Direct LDL reflex    Albumin / creatinine urine ratio    Albumin / creatinine urine ratio    Comprehensive metabolic panel    Hemoglobin A1C    TSH, 3rd generation with Free T4 reflex    Lipid Panel with Direct LDL reflex    POCT hemoglobin A1c       ALLERGIES:  Allergies   Allergen Reactions    Skin Protectants, Misc. Dermatitis     Reports developing weeping dermatitis from topical use of Coloplast TRIAD Paste       Current Medications     Current Outpatient Medications   Medication Sig Dispense Refill    acetaminophen (TYLENOL) 500 mg tablet Take 500 mg by mouth every 6 (six) hours as needed      albuterol (ProAir HFA) 90 mcg/act inhaler Inhale 2 puffs every 6 (six) hours as needed for wheezing or shortness of breath 8.5 g 1    amiodarone 100 mg tablet Take 100 mg by mouth daily      amLODIPine (NORVASC) 5 mg tablet Take 5 mg by mouth daily  5    ammonium lactate (LAC-HYDRIN) 12 % cream APPLY CREAM TOPICALLY TO AFFECTED AREA TWICE DAILY AS NEEDED FOR DRY SKIN      ammonium lactate (LAC-HYDRIN) 12 % lotion Apply topically 2 (two) times a day as needed for dry skin 225 g 1    apixaban (ELIQUIS) 5 mg Take 5 mg by mouth 2 (two) times a day        atorvastatin (LIPITOR) 80 mg tablet Take 80 mg by mouth Taking half a tablet a day (40mg) daily      B-D ULTRAFINE III SHORT PEN 31G X 8 MM MISC Inject under the skin 4 (four) times a day 200 each 5    BD Pen Needle  Stephani 2nd Gen 32G X 4 MM MISC USE 1 TO INJECT INSULIN 4 TIMES DAILY      Blood Glucose Monitoring Suppl (ACCU-CHEK GUIDE ME) w/Device KIT Test once or twice daily, e11.9 1 kit 0    Cholecalciferol 25 MCG (1000 UT) tablet Take 2,000 Units by mouth daily      CRANBERRY PO Take by mouth      furosemide (LASIX) 40 mg tablet Take 40 mg by mouth      glucose blood (ACCU-CHEK MONICA PLUS) test strip 1 each by Other route 2 (two) times a day Use as instructed 100 each 5    glucose blood test strip Accu-chek Monica Plus meter.      insulin aspart (NovoLOG FlexPen) 100 UNIT/ML injection pen 15 Units 3 (three) times a day with meals      insulin glargine (Lantus SoloStar) 100 units/mL injection pen Inject 30 Units under the skin daily 5 pen 3    Lancet Devices (ACCU-CHEK SOFTCLIX) lancets To be used B.I.D 1 each 5    Lancets (ACCU-CHEK SOFT TOUCH) lancets Test once or twice daily, e11.9 accu check guide 100 each 5    levothyroxine 50 mcg tablet TAKE 1 TABLET BY MOUTH ONCE DAILY IN THE MORNING 90 tablet 1    lisinopril (ZESTRIL) 20 mg tablet Take 20 mg by mouth      losartan (COZAAR) 50 mg tablet Take 50 mg by mouth daily      Ozempic, 1 MG/DOSE, 4 MG/3ML injection pen INJECT 1MG UNDER THE SKIN ONCE EVERY 7 DAYS      benzonatate (TESSALON PERLES) 100 mg capsule Take 1 capsule (100 mg total) by mouth 3 (three) times a day as needed for cough (Patient not taking: Reported on 11/2/2023) 20 capsule 0    insulin lispro (HumaLOG KwikPen) 100 units/mL injection pen Inject 5 Units under the skin 3 (three) times a day with meals (Patient not taking: Reported on 9/5/2024) 5 pen 2    metoprolol tartrate (LOPRESSOR) 50 mg tablet Take 25 mg by mouth 2 (two) times a day   (Patient not taking: Reported on 9/5/2024)      sertraline (ZOLOFT) 50 mg tablet TAKE 1 & 1/2 (ONE & ONE-HALF) TABLETS BY MOUTH ONCE DAILY 45 tablet 0    Trulicity 3 MG/0.5ML injection Inject 3 mg under the skin in the morning       No current facility-administered medications  for this visit.         Health Maintenance     Health Maintenance   Topic Date Due    Breast Cancer Screening: Mammogram  Never done    COVID-19 Vaccine (3 - 2023-24 season) 09/01/2024    Influenza Vaccine (1) 09/01/2024    Depression Screening  03/07/2025    Colorectal Cancer Screening  03/07/2025 (Originally 7/22/2007)    Kidney Health Evaluation: GFR  02/22/2025    HEMOGLOBIN A1C  03/05/2025    Annual Physical  03/07/2025    Kidney Health Evaluation: Albumin/Creatinine Ratio  09/05/2025    Diabetic Foot Exam  09/05/2025    DM Eye Exam  08/06/2026    DTaP,Tdap,and Td Vaccines (3 - Td or Tdap) 12/09/2032    HIV Screening  Completed    Hepatitis C Screening  Completed    RSV Vaccine Age 60+ Years  Completed    Zoster Vaccine  Completed    Pneumococcal Vaccine: Pediatrics (0 to 5 Years) and At-Risk Patients (6 to 64 Years)  Completed    RSV Vaccine age 0-20 Months  Aged Out    HIB Vaccine  Aged Out    IPV Vaccine  Aged Out    Hepatitis A Vaccine  Aged Out    Meningococcal ACWY Vaccine  Aged Out    HPV Vaccine  Aged Out     Immunization History   Administered Date(s) Administered    COVID-19 MODERNA VACC 0.5 ML IM 04/10/2021, 05/08/2021    Fluzone Split Quad 0.25 mL 10/29/2018    INFLUENZA 01/13/2018, 10/29/2018, 10/31/2020, 12/03/2021, 11/18/2022, 10/19/2023    Influenza, injectable, quadrivalent, preservative free 0.5 mL 11/13/2019    Pneumococcal Conjugate Vaccine 20-valent (Pcv20), Polysace 12/09/2022, 12/09/2022    Pneumococcal Polysaccharide PPV23 08/01/2010, 09/30/2013    Respiratory Syncytial Virus Vaccine (Recombinant, Adjuvanted) 10/19/2023    Tdap 12/09/2022, 12/09/2022    Zoster 09/28/2018    Zoster Vaccine Recombinant 09/28/2018, 02/16/2019      Patient's shoes and socks removed.    Right Foot/Ankle   Right Foot Inspection  Skin Exam: skin normal, skin intact and dry skin. No warmth, no callus, no erythema, no maceration, no abnormal color, no pre-ulcer, no ulcer and no callus.     Toe Exam: ROM and  strength within normal limits.     Sensory   Monofilament testing: intact    Vascular  Capillary refills: < 3 seconds  The right DP pulse is 2+.     Left Foot/Ankle  Left Foot Inspection  Skin Exam: skin normal, skin intact and dry skin. No warmth, no erythema, no maceration, normal color, no pre-ulcer, no ulcer and no callus.     Toe Exam: ROM and strength within normal limits.     Sensory   Monofilament testing: intact    Vascular  Capillary refills: < 3 seconds  The left DP pulse is 2+.     Assign Risk Category  No deformity present  No loss of protective sensation  No weak pulses  Risk: 0       GIANLUCA De Oliveira

## 2024-09-17 ENCOUNTER — VBI (OUTPATIENT)
Dept: ADMINISTRATIVE | Facility: OTHER | Age: 64
End: 2024-09-17

## 2024-09-17 NOTE — TELEPHONE ENCOUNTER
09/17/24 7:41 AM     Chart reviewed for Diabetic Eye Exam was/were submitted to the patient's insurance.     Daylin Russo   [facility]

## 2024-09-21 DIAGNOSIS — F32.A ANXIETY AND DEPRESSION: ICD-10-CM

## 2024-09-21 DIAGNOSIS — F41.9 ANXIETY AND DEPRESSION: ICD-10-CM

## 2024-09-30 PROBLEM — R21 RASH: Status: ACTIVE | Noted: 2024-09-30

## 2024-09-30 RX ORDER — AMMONIUM LACTATE 12 G/100G
CREAM TOPICAL
COMMUNITY
Start: 2024-09-05

## 2024-09-30 NOTE — PATIENT INSTRUCTIONS
"Patient Education     Type 2 diabetes   The Basics   Written by the doctors and editors at [facility]   What is type 2 diabetes? -- This is a disorder that disrupts the way the body uses sugar. It is sometimes called type 2 diabetes mellitus.  All of the cells in the body need sugar to work normally. Sugar gets into the cells with the help of a hormone called insulin. Insulin is made by the pancreas, an organ in the belly. If there is not enough insulin, or if cells in the body don't respond normally to insulin, sugar builds up in the blood. That is what happens to people with diabetes.  There are 2 different types of diabetes:   In type 1 diabetes, the pancreas makes little or no insulin.   In type 2 diabetes, the pancreas still makes some insulin, but the cells in the body stop responding normally. Eventually, the pancreas cannot make enough insulin to keep up.  Having excess body weight or obesity increases a person's risk of developing type 2 diabetes. But people without excess body weight can get diabetes, too.  What are the symptoms of type 2 diabetes? -- Type 2 diabetes usually causes no symptoms. When symptoms do happen, they include:   Needing to urinate often   Intense thirst   Blurry vision  Can diabetes lead to other health problems? -- Yes. Type 2 diabetes might not make you feel sick. But if it is not managed, it can lead to serious problems over time, such as:   Heart attacks   Strokes   Kidney disease   Vision problems (or even blindness)   Pain or loss of feeling in the hands and feet   Needing to have fingers, toes, or other body parts removed (amputated)  How do I know if I have type 2 diabetes? -- Your doctor or nurse can do a blood test. There are 2 tests that can be used for this. Both involve measuring the amount of sugar in your blood, called your \"blood sugar\" or \"blood glucose\":   One of the tests measures your blood sugar at the time the blood sample is taken. This test is done in the " "morning. You can't eat or drink anything except water for at least 8 hours before the test.   The other test shows what your average blood sugar has been for the past 2 to 3 months. This blood test is called \"hemoglobin A1C\" or just \"A1C.\" It can be checked at any time of the day, even if you have recently eaten.  How is type 2 diabetes treated? -- The goals of treatment are to manage your blood sugar and lower the risk of future problems that can happen in people with diabetes.  Treatment might include:   Lifestyle changes - This is an important part of managing diabetes. It includes eating healthy foods and getting plenty of physical activity.   Medicines - There are a few medicines that help lower blood sugar. Some people need to take pills that help the body make more insulin or that help insulin do its job. Others need insulin shots.  Depending on what medicines you take, you might need to check your blood sugar regularly at home. But not everyone with type 2 diabetes needs to do this. Your doctor or nurse will tell you if you should be checking your blood sugar, and when and how to do this.  Sometimes, people with type 2 diabetes also need medicines to help prevent problems caused by the disease. For instance, medicines used to lower blood pressure can reduce the chances of a heart attack or stroke.   General medical care - It's also important to take care of other areas of your health. This includes watching your blood pressure and cholesterol levels. You should also get certain vaccines, such as vaccines to protect against the flu and coronavirus disease 2019 (\"COVID-19\"). Some people also need a vaccine to prevent pneumonia.  Can type 2 diabetes be prevented? -- Yes. To lower your chances of getting type 2 diabetes, the most important thing you can do is eat a healthy diet and get plenty of physical activity. This can help you lose weight if you are overweight. But eating well and being active are also good " for your overall health. Even gentle activity, like walking, has benefits.  If you smoke, quitting can also lower your risk of type 2 diabetes. Quitting smoking can be difficult, but your doctor or nurse can help.  All topics are updated as new evidence becomes available and our peer review process is complete.  This topic retrieved from [employer] on: Apr 24, 2024.  Topic 33043 Version 23.0  Release: 32.3.2 - C32.113  © 2024 UpToDate, Inc. and/or its affiliates. All rights reserved.  Consumer Information Use and Disclaimer   Disclaimer: This generalized information is a limited summary of diagnosis, treatment, and/or medication information. It is not meant to be comprehensive and should be used as a tool to help the user understand and/or assess potential diagnostic and treatment options. It does NOT include all information about conditions, treatments, medications, side effects, or risks that may apply to a specific patient. It is not intended to be medical advice or a substitute for the medical advice, diagnosis, or treatment of a health care provider based on the health care provider's examination and assessment of a patient's specific and unique circumstances. Patients must speak with a health care provider for complete information about their health, medical questions, and treatment options, including any risks or benefits regarding use of medications. This information does not endorse any treatments or medications as safe, effective, or approved for treating a specific patient. UpToDate, Inc. and its affiliates disclaim any warranty or liability relating to this information or the use thereof.The use of this information is governed by the Terms of Use, available at https://www.wolterskluwer.com/en/know/clinical-effectiveness-terms. 2024© UpToDate, Inc. and its affiliates and/or licensors. All rights reserved.  Copyright   © 2024 UpToDate, Inc. and/or its affiliates. All rights reserved.    Patient Education    "  Foot care for people with diabetes   The Basics   Written by the doctors and editors at [facility]   Why is foot care important if I have diabetes? -- Diabetes can cause nerve damage if your blood sugar is high for a long time. The medical term for this is \"diabetic neuropathy.\"  If you have problems with the nerves in your feet, you might not be able to feel pain in your foot. Normally, people feel pain when they get a cut or a blister on their foot. The pain tells them that they need to treat their cut so it can heal. But people with nerve damage might not feel any pain when their feet get hurt. They might not even know that they have a cut, so they might not treat it. Problems that aren't treated right away can get much worse. For example, an untreated cut can get infected and turn into an open sore.  High blood sugar can also damage blood vessels and decrease blood flow to your feet. This can weaken your skin and make wounds take longer to heal. You are also more likely to get an infection if you have high blood sugar.  How do I take care of my feet? -- Taking good care of your feet can help prevent foot problems. You should:   Wash your feet every day with soap and warm water. Pat your feet dry, and be sure to dry the skin between your toes.   Keep your feet moisturized. Put lotion on the tops and bottoms of your feet, but not between your toes.   Check your feet every day (figure 1). Look for cuts, blisters, redness, or swelling. Use a mirror, or ask someone to help you check the bottoms of your feet. Check all parts of the foot, especially between the toes. Look for broken skin, ulcers, blisters, or redness.   Trim your toenails straight across when needed (figure 2). Do not cut the corners of your toenails. File rough edges. Do not cut your cuticles. Ask for help if you cannot see well or have problems reaching your feet.   Ask your doctor or nurse to check your feet at each visit. Take your shoes and socks " off for these checks.   See a foot care provider (such as a podiatrist) if you have an ingrown toenail, corn, or callus. Do not try to remove corns and calluses yourself.  How do I protect my feet from injury? -- There are several ways to protect your feet. You can:   Wear shoes and socks at all times, even at home. Do not walk barefoot. Wear swim shoes if you go to the beach or a swimming pool.   Choose shoes that fit well. They should not be not too tight or too loose. Your shoes should have plenty of room for your toes (figure 3). Your doctor might give you a prescription for special shoes. Check to see if they are covered by your insurance.   Check your shoes each time before you put them on to make sure that the lining is smooth. Also check to make sure that there is nothing inside the shoes before putting them on.   Do not wear shoes that expose any part of the foot, like sandals, thongs, or clogs.   Wear cotton socks that fit loosely. Do not wear shoes without socks.   Protect your feet from heat and cold. Test bath water before putting your feet in it to make sure that it is not too hot. Do not walk barefoot on hot ground. Take extra care when going outside in the cold and wear warm socks.  What else should I know? -- You can lower your risk for foot problems by keeping your blood sugar levels as close to your goal as possible. Other things you can do include:   Move your ankles and toes often to help with blood flow. You can wear a support stocking to help with swelling.   Walk often. Regular walking helps blood flow.   If you smoke, try to quit. Your doctor or nurse can help. Smoking causes poor blood flow to your feet and can damage your nerves.  When should I call the doctor? -- Call your doctor or nurse for advice if you have:   A fever of 100.4°F (38°C) or higher, chills, or a wound that will not heal   Swelling, redness, warmth around a wound, a foul smell coming from a wound, or yellowish, greenish,  or bloody discharge   Sores or blisters on your feet that hurt more or less than you would expect   Numbness or tingling in your foot or leg   Corns, calluses, blisters, or new sores on your foot   Very dry, scaly, or cracked skin on your feet   Changes in the way your foot joints or arch look  All topics are updated as new evidence becomes available and our peer review process is complete.  This topic retrieved from [employer] on: Mar 13, 2024.  Topic 679496 Version 2.0  Release: 32.2.4 - C32.71  © 2024 UpToDate, Inc. and/or its affiliates. All rights reserved.  figure 1: Foot check for people with diabetes     People with diabetes should check both of their feet every day. It is important to check your feet all over, including in between your toes. If you can't see the bottom of your foot, use a mirror or ask another person to check for you. Let your doctor or nurse know if you find any:  Redness   Cuts or cracks in the skin   Blisters   Swelling   Graphic 31270 Version 3.0  figure 2: Trim your toenails     Trim your toenails straight across and smooth them with a nail file.  Graphic 11202 Version 2.0  figure 3: Correct shoe shape     Choose shoes that fit the right way and are not too tight or too loose. Your shoes should have plenty of room for your toes.  Graphic 07622 Version 2.0  Consumer Information Use and Disclaimer   Disclaimer: This generalized information is a limited summary of diagnosis, treatment, and/or medication information. It is not meant to be comprehensive and should be used as a tool to help the user understand and/or assess potential diagnostic and treatment options. It does NOT include all information about conditions, treatments, medications, side effects, or risks that may apply to a specific patient. It is not intended to be medical advice or a substitute for the medical advice, diagnosis, or treatment of a health care provider based on the health care provider's examination and assessment  of a patient's specific and unique circumstances. Patients must speak with a health care provider for complete information about their health, medical questions, and treatment options, including any risks or benefits regarding use of medications. This information does not endorse any treatments or medications as safe, effective, or approved for treating a specific patient. UpToDate, Inc. and its affiliates disclaim any warranty or liability relating to this information or the use thereof.The use of this information is governed by the Terms of Use, available at https://www.[employer].com/en/know/clinical-effectiveness-terms. 2024© UpToDate, Inc. and its affiliates and/or licensors. All rights reserved.  Copyright   © 2024 UpToDate, Inc. and/or its affiliates. All rights reserved.    Patient Education     Type 2 diabetes   The Basics   Written by the doctors and editors at [employer]   What is type 2 diabetes? -- This is a disorder that disrupts the way the body uses sugar. It is sometimes called type 2 diabetes mellitus.  All of the cells in the body need sugar to work normally. Sugar gets into the cells with the help of a hormone called insulin. Insulin is made by the pancreas, an organ in the belly. If there is not enough insulin, or if cells in the body don't respond normally to insulin, sugar builds up in the blood. That is what happens to people with diabetes.  There are 2 different types of diabetes:   In type 1 diabetes, the pancreas makes little or no insulin.   In type 2 diabetes, the pancreas still makes some insulin, but the cells in the body stop responding normally. Eventually, the pancreas cannot make enough insulin to keep up.  Having excess body weight or obesity increases a person's risk of developing type 2 diabetes. But people without excess body weight can get diabetes, too.  What are the symptoms of type 2 diabetes? -- Type 2 diabetes usually causes no symptoms. When symptoms do happen, they  "include:   Needing to urinate often   Intense thirst   Blurry vision  Can diabetes lead to other health problems? -- Yes. Type 2 diabetes might not make you feel sick. But if it is not managed, it can lead to serious problems over time, such as:   Heart attacks   Strokes   Kidney disease   Vision problems (or even blindness)   Pain or loss of feeling in the hands and feet   Needing to have fingers, toes, or other body parts removed (amputated)  How do I know if I have type 2 diabetes? -- Your doctor or nurse can do a blood test. There are 2 tests that can be used for this. Both involve measuring the amount of sugar in your blood, called your \"blood sugar\" or \"blood glucose\":   One of the tests measures your blood sugar at the time the blood sample is taken. This test is done in the morning. You can't eat or drink anything except water for at least 8 hours before the test.   The other test shows what your average blood sugar has been for the past 2 to 3 months. This blood test is called \"hemoglobin A1C\" or just \"A1C.\" It can be checked at any time of the day, even if you have recently eaten.  How is type 2 diabetes treated? -- The goals of treatment are to manage your blood sugar and lower the risk of future problems that can happen in people with diabetes.  Treatment might include:   Lifestyle changes - This is an important part of managing diabetes. It includes eating healthy foods and getting plenty of physical activity.   Medicines - There are a few medicines that help lower blood sugar. Some people need to take pills that help the body make more insulin or that help insulin do its job. Others need insulin shots.  Depending on what medicines you take, you might need to check your blood sugar regularly at home. But not everyone with type 2 diabetes needs to do this. Your doctor or nurse will tell you if you should be checking your blood sugar, and when and how to do this.  Sometimes, people with type 2 diabetes " "also need medicines to help prevent problems caused by the disease. For instance, medicines used to lower blood pressure can reduce the chances of a heart attack or stroke.   General medical care - It's also important to take care of other areas of your health. This includes watching your blood pressure and cholesterol levels. You should also get certain vaccines, such as vaccines to protect against the flu and coronavirus disease 2019 (\"COVID-19\"). Some people also need a vaccine to prevent pneumonia.  Can type 2 diabetes be prevented? -- Yes. To lower your chances of getting type 2 diabetes, the most important thing you can do is eat a healthy diet and get plenty of physical activity. This can help you lose weight if you are overweight. But eating well and being active are also good for your overall health. Even gentle activity, like walking, has benefits.  If you smoke, quitting can also lower your risk of type 2 diabetes. Quitting smoking can be difficult, but your doctor or nurse can help.  All topics are updated as new evidence becomes available and our peer review process is complete.  This topic retrieved from [employer] on: Apr 24, 2024.  Topic 73360 Version 23.0  Release: 32.3.2 - C32.113  © 2024 UpToDate, Inc. and/or its affiliates. All rights reserved.  Consumer Information Use and Disclaimer   Disclaimer: This generalized information is a limited summary of diagnosis, treatment, and/or medication information. It is not meant to be comprehensive and should be used as a tool to help the user understand and/or assess potential diagnostic and treatment options. It does NOT include all information about conditions, treatments, medications, side effects, or risks that may apply to a specific patient. It is not intended to be medical advice or a substitute for the medical advice, diagnosis, or treatment of a health care provider based on the health care provider's examination and assessment of a patient's " "specific and unique circumstances. Patients must speak with a health care provider for complete information about their health, medical questions, and treatment options, including any risks or benefits regarding use of medications. This information does not endorse any treatments or medications as safe, effective, or approved for treating a specific patient. UpToDate, Inc. and its affiliates disclaim any warranty or liability relating to this information or the use thereof.The use of this information is governed by the Terms of Use, available at https://www.[employer].com/en/know/clinical-effectiveness-terms. 2024© UpToDate, Inc. and its affiliates and/or licensors. All rights reserved.  Copyright   © 2024 UpToDate, Inc. and/or its affiliates. All rights reserved.    Patient Education     Foot care for people with diabetes   The Basics   Written by the doctors and editors at [employer]   Why is foot care important if I have diabetes? -- Diabetes can cause nerve damage if your blood sugar is high for a long time. The medical term for this is \"diabetic neuropathy.\"  If you have problems with the nerves in your feet, you might not be able to feel pain in your foot. Normally, people feel pain when they get a cut or a blister on their foot. The pain tells them that they need to treat their cut so it can heal. But people with nerve damage might not feel any pain when their feet get hurt. They might not even know that they have a cut, so they might not treat it. Problems that aren't treated right away can get much worse. For example, an untreated cut can get infected and turn into an open sore.  High blood sugar can also damage blood vessels and decrease blood flow to your feet. This can weaken your skin and make wounds take longer to heal. You are also more likely to get an infection if you have high blood sugar.  How do I take care of my feet? -- Taking good care of your feet can help prevent foot problems. You " should:   Wash your feet every day with soap and warm water. Pat your feet dry, and be sure to dry the skin between your toes.   Keep your feet moisturized. Put lotion on the tops and bottoms of your feet, but not between your toes.   Check your feet every day (figure 1). Look for cuts, blisters, redness, or swelling. Use a mirror, or ask someone to help you check the bottoms of your feet. Check all parts of the foot, especially between the toes. Look for broken skin, ulcers, blisters, or redness.   Trim your toenails straight across when needed (figure 2). Do not cut the corners of your toenails. File rough edges. Do not cut your cuticles. Ask for help if you cannot see well or have problems reaching your feet.   Ask your doctor or nurse to check your feet at each visit. Take your shoes and socks off for these checks.   See a foot care provider (such as a podiatrist) if you have an ingrown toenail, corn, or callus. Do not try to remove corns and calluses yourself.  How do I protect my feet from injury? -- There are several ways to protect your feet. You can:   Wear shoes and socks at all times, even at home. Do not walk barefoot. Wear swim shoes if you go to the beach or a swimming pool.   Choose shoes that fit well. They should not be not too tight or too loose. Your shoes should have plenty of room for your toes (figure 3). Your doctor might give you a prescription for special shoes. Check to see if they are covered by your insurance.   Check your shoes each time before you put them on to make sure that the lining is smooth. Also check to make sure that there is nothing inside the shoes before putting them on.   Do not wear shoes that expose any part of the foot, like sandals, thongs, or clogs.   Wear cotton socks that fit loosely. Do not wear shoes without socks.   Protect your feet from heat and cold. Test bath water before putting your feet in it to make sure that it is not too hot. Do not walk barefoot on hot  ground. Take extra care when going outside in the cold and wear warm socks.  What else should I know? -- You can lower your risk for foot problems by keeping your blood sugar levels as close to your goal as possible. Other things you can do include:   Move your ankles and toes often to help with blood flow. You can wear a support stocking to help with swelling.   Walk often. Regular walking helps blood flow.   If you smoke, try to quit. Your doctor or nurse can help. Smoking causes poor blood flow to your feet and can damage your nerves.  When should I call the doctor? -- Call your doctor or nurse for advice if you have:   A fever of 100.4°F (38°C) or higher, chills, or a wound that will not heal   Swelling, redness, warmth around a wound, a foul smell coming from a wound, or yellowish, greenish, or bloody discharge   Sores or blisters on your feet that hurt more or less than you would expect   Numbness or tingling in your foot or leg   Corns, calluses, blisters, or new sores on your foot   Very dry, scaly, or cracked skin on your feet   Changes in the way your foot joints or arch look  All topics are updated as new evidence becomes available and our peer review process is complete.  This topic retrieved from [employer] on: Mar 13, 2024.  Topic 194447 Version 2.0  Release: 32.2.4 - C32.71  © 2024 UpToDate, Inc. and/or its affiliates. All rights reserved.  figure 1: Foot check for people with diabetes     People with diabetes should check both of their feet every day. It is important to check your feet all over, including in between your toes. If you can't see the bottom of your foot, use a mirror or ask another person to check for you. Let your doctor or nurse know if you find any:  Redness   Cuts or cracks in the skin   Blisters   Swelling   Graphic 22363 Version 3.0  figure 2: Trim your toenails     Trim your toenails straight across and smooth them with a nail file.  Graphic 06637 Version 2.0  figure 3: Correct  shoe shape     Choose shoes that fit the right way and are not too tight or too loose. Your shoes should have plenty of room for your toes.  Graphic 03903 Version 2.0  Consumer Information Use and Disclaimer   Disclaimer: This generalized information is a limited summary of diagnosis, treatment, and/or medication information. It is not meant to be comprehensive and should be used as a tool to help the user understand and/or assess potential diagnostic and treatment options. It does NOT include all information about conditions, treatments, medications, side effects, or risks that may apply to a specific patient. It is not intended to be medical advice or a substitute for the medical advice, diagnosis, or treatment of a health care provider based on the health care provider's examination and assessment of a patient's specific and unique circumstances. Patients must speak with a health care provider for complete information about their health, medical questions, and treatment options, including any risks or benefits regarding use of medications. This information does not endorse any treatments or medications as safe, effective, or approved for treating a specific patient. [employer] and its affiliates disclaim any warranty or liability relating to this information or the use thereof.The use of this information is governed by the Terms of Use, available at https://www.wolterskluwer.com/en/know/clinical-effectiveness-terms. 2024© UpToDate, Inc. and its affiliates and/or licensors. All rights reserved.  Copyright   © 2024 [employer]Date, Inc. and/or its affiliates. All rights reserved.

## 2024-09-30 NOTE — ASSESSMENT & PLAN NOTE
Patient following with endocrinology  Needs to follow ada diet  Cont oxempic,lantus  Lab Results   Component Value Date    HGBA1C 8.5 (A) 09/05/2024

## 2024-10-25 DIAGNOSIS — F41.9 ANXIETY AND DEPRESSION: ICD-10-CM

## 2024-10-25 DIAGNOSIS — F32.A ANXIETY AND DEPRESSION: ICD-10-CM

## 2024-11-02 DIAGNOSIS — R21 RASH: ICD-10-CM

## 2024-11-02 RX ORDER — AMMONIUM LACTATE 12 G/100G
CREAM TOPICAL
Qty: 385 G | Refills: 0 | Status: SHIPPED | OUTPATIENT
Start: 2024-11-02

## 2024-11-29 DIAGNOSIS — E03.9 ACQUIRED HYPOTHYROIDISM: ICD-10-CM

## 2024-11-29 RX ORDER — LEVOTHYROXINE SODIUM 50 UG/1
50 TABLET ORAL EVERY MORNING
Qty: 90 TABLET | Refills: 1 | Status: SHIPPED | OUTPATIENT
Start: 2024-11-29

## 2024-12-12 ENCOUNTER — PATIENT MESSAGE (OUTPATIENT)
Dept: FAMILY MEDICINE CLINIC | Facility: CLINIC | Age: 64
End: 2024-12-12

## 2024-12-13 ENCOUNTER — VBI (OUTPATIENT)
Dept: ADMINISTRATIVE | Facility: OTHER | Age: 64
End: 2024-12-13

## 2024-12-13 NOTE — TELEPHONE ENCOUNTER
12/13/24 7:01 AM     Chart reviewed for Hemoglobin A1c was/were submitted to the patient's insurance.     Daylin Russo   [facility]

## 2025-03-19 ENCOUNTER — OFFICE VISIT (OUTPATIENT)
Dept: FAMILY MEDICINE CLINIC | Facility: CLINIC | Age: 65
End: 2025-03-19
Payer: COMMERCIAL

## 2025-03-19 VITALS
OXYGEN SATURATION: 98 % | RESPIRATION RATE: 16 BRPM | HEART RATE: 87 BPM | HEIGHT: 63 IN | DIASTOLIC BLOOD PRESSURE: 90 MMHG | SYSTOLIC BLOOD PRESSURE: 160 MMHG | TEMPERATURE: 98.1 F | WEIGHT: 233 LBS | BODY MASS INDEX: 41.29 KG/M2

## 2025-03-19 DIAGNOSIS — Z79.4 TYPE 2 DIABETES MELLITUS WITH OTHER CIRCULATORY COMPLICATION, WITH LONG-TERM CURRENT USE OF INSULIN (HCC): Primary | ICD-10-CM

## 2025-03-19 DIAGNOSIS — Z00.00 ANNUAL PHYSICAL EXAM: ICD-10-CM

## 2025-03-19 DIAGNOSIS — J06.9 ACUTE UPPER RESPIRATORY INFECTION: ICD-10-CM

## 2025-03-19 DIAGNOSIS — E11.59 TYPE 2 DIABETES MELLITUS WITH OTHER CIRCULATORY COMPLICATION, WITH LONG-TERM CURRENT USE OF INSULIN (HCC): Primary | ICD-10-CM

## 2025-03-19 PROCEDURE — 99396 PREV VISIT EST AGE 40-64: CPT | Performed by: NURSE PRACTITIONER

## 2025-03-19 RX ORDER — DOXYCYCLINE HYCLATE 100 MG
100 TABLET ORAL 2 TIMES DAILY
Qty: 14 TABLET | Refills: 0 | Status: SHIPPED | OUTPATIENT
Start: 2025-03-19 | End: 2025-03-26

## 2025-03-19 NOTE — PATIENT INSTRUCTIONS
"Patient Education     Routine physical for adults   The Basics   Written by the doctors and editors at [facility]   What is a physical? -- A physical is a routine visit, or \"check-up,\" with your doctor. You might also hear it called a \"wellness visit\" or \"preventive visit.\"  During each visit, the doctor will:   Ask about your physical and mental health   Ask about your habits, behaviors, and lifestyle   Do an exam   Give you vaccines if needed   Talk to you about any medicines you take   Give advice about your health   Answer your questions  Getting regular check-ups is an important part of taking care of your health. It can help your doctor find and treat any problems you have. But it's also important for preventing health problems.  A routine physical is different from a \"sick visit.\" A sick visit is when you see a doctor because of a health concern or problem. Since physicals are scheduled ahead of time, you can think about what you want to ask the doctor.  How often should I get a physical? -- It depends on your age and health. In general, for people age 21 years and older:   If you are younger than 50 years, you might be able to get a physical every 3 years.   If you are 50 years or older, your doctor might recommend a physical every year.  If you have an ongoing health condition, like diabetes or high blood pressure, your doctor will probably want to see you more often.  What happens during a physical? -- In general, each visit will include:   Physical exam - The doctor or nurse will check your height, weight, heart rate, and blood pressure. They will also look at your eyes and ears. They will ask about how you are feeling and whether you have any symptoms that bother you.   Medicines - It's a good idea to bring a list of all the medicines you take to each doctor visit. Your doctor will talk to you about your medicines and answer any questions. Tell them if you are having any side effects that bother you. You " "should also tell them if you are having trouble paying for any of your medicines.   Habits and behaviors - This includes:   Your diet   Your exercise habits   Whether you smoke, drink alcohol, or use drugs   Whether you are sexually active   Whether you feel safe at home  Your doctor will talk to you about things you can do to improve your health and lower your risk of health problems. They will also offer help and support. For example, if you want to quit smoking, they can give you advice and might prescribe medicines. If you want to improve your diet or get more physical activity, they can help you with this, too.   Lab tests, if needed - The tests you get will depend on your age and situation. For example, your doctor might want to check your:   Cholesterol   Blood sugar   Iron level   Vaccines - The recommended vaccines will depend on your age, health, and what vaccines you already had. Vaccines are very important because they can prevent certain serious or deadly infections.   Discussion of screening - \"Screening\" means checking for diseases or other health problems before they cause symptoms. Your doctor can recommend screening based on your age, risk, and preferences. This might include tests to check for:   Cancer, such as breast, prostate, cervical, ovarian, colorectal, prostate, lung, or skin cancer   Sexually transmitted infections, such as chlamydia and gonorrhea   Mental health conditions like depression and anxiety  Your doctor will talk to you about the different types of screening tests. They can help you decide which screenings to have. They can also explain what the results might mean.   Answering questions - The physical is a good time to ask the doctor or nurse questions about your health. If needed, they can refer you to other doctors or specialists, too.  Adults older than 65 years often need other care, too. As you get older, your doctor will talk to you about:   How to prevent falling at " home   Hearing or vision tests   Memory testing   How to take your medicines safely   Making sure that you have the help and support you need at home  All topics are updated as new evidence becomes available and our peer review process is complete.  This topic retrieved from [employer] on: May 02, 2024.  Topic 488676 Version 1.0  Release: 32.4.3 - C32.122  © 2024 UpToDate, Inc. and/or its affiliates. All rights reserved.  Consumer Information Use and Disclaimer   Disclaimer: This generalized information is a limited summary of diagnosis, treatment, and/or medication information. It is not meant to be comprehensive and should be used as a tool to help the user understand and/or assess potential diagnostic and treatment options. It does NOT include all information about conditions, treatments, medications, side effects, or risks that may apply to a specific patient. It is not intended to be medical advice or a substitute for the medical advice, diagnosis, or treatment of a health care provider based on the health care provider's examination and assessment of a patient's specific and unique circumstances. Patients must speak with a health care provider for complete information about their health, medical questions, and treatment options, including any risks or benefits regarding use of medications. This information does not endorse any treatments or medications as safe, effective, or approved for treating a specific patient. Up[employer]Date, Inc. and its affiliates disclaim any warranty or liability relating to this information or the use thereof.The use of this information is governed by the Terms of Use, available at https://www.woltersgroSolaruwer.com/en/know/clinical-effectiveness-terms. 2024© [employer]Date, Inc. and its affiliates and/or licensors. All rights reserved.  Copyright   © 2024 [employer]Date, Inc. and/or its affiliates. All rights reserved.    Patient Education     Type 2 diabetes   The Basics   Written by the doctors and  "editors at [employer]   What is type 2 diabetes? -- This is a disorder that disrupts the way the body uses sugar. It is sometimes called type 2 diabetes mellitus.  All of the cells in the body need sugar to work normally. Sugar gets into the cells with the help of a hormone called insulin. Insulin is made by the pancreas, an organ in the belly. If there is not enough insulin, or if cells in the body don't respond normally to insulin, sugar builds up in the blood. That is what happens to people with diabetes.  There are 2 different types of diabetes:   In type 1 diabetes, the pancreas makes little or no insulin.   In type 2 diabetes, the pancreas still makes some insulin, but the cells in the body stop responding normally. Eventually, the pancreas cannot make enough insulin to keep up.  Having excess body weight or obesity increases a person's risk of developing type 2 diabetes. But people without excess body weight can get diabetes, too.  What are the symptoms of type 2 diabetes? -- Type 2 diabetes usually causes no symptoms. When symptoms do happen, they include:   Needing to urinate often   Intense thirst   Blurry vision  Can diabetes lead to other health problems? -- Yes. Type 2 diabetes might not make you feel sick. But if it is not managed, it can lead to serious problems over time, such as:   Heart attacks   Strokes   Kidney disease   Vision problems (or even blindness)   Pain or loss of feeling in the hands and feet   Needing to have fingers, toes, or other body parts removed (amputated)  How do I know if I have type 2 diabetes? -- Your doctor or nurse can do a blood test. There are 2 tests that can be used for this. Both involve measuring the amount of sugar in your blood, called your \"blood sugar\" or \"blood glucose\":   One of the tests measures your blood sugar at the time the blood sample is taken. This test is done in the morning. You can't eat or drink anything except water for at least 8 hours before the " "test.   The other test shows what your average blood sugar has been for the past 2 to 3 months. This blood test is called \"hemoglobin A1C\" or just \"A1C.\" It can be checked at any time of the day, even if you have recently eaten.  How is type 2 diabetes treated? -- The goals of treatment are to manage your blood sugar and lower the risk of future problems that can happen in people with diabetes.  Treatment might include:   Lifestyle changes - This is an important part of managing diabetes. It includes eating healthy foods and getting plenty of physical activity.   Medicines - There are a few medicines that help lower blood sugar. Some people need to take pills that help the body make more insulin or that help insulin do its job. Others need insulin shots.  Depending on what medicines you take, you might need to check your blood sugar regularly at home. But not everyone with type 2 diabetes needs to do this. Your doctor or nurse will tell you if you should be checking your blood sugar, and when and how to do this.  Sometimes, people with type 2 diabetes also need medicines to help prevent problems caused by the disease. For instance, medicines used to lower blood pressure can reduce the chances of a heart attack or stroke.   General medical care - It's also important to take care of other areas of your health. This includes watching your blood pressure and cholesterol levels. You should also get certain vaccines, such as vaccines to protect against the flu and coronavirus disease 2019 (\"COVID-19\"). Some people also need a vaccine to prevent pneumonia.  Can type 2 diabetes be prevented? -- Yes. To lower your chances of getting type 2 diabetes, the most important thing you can do is eat a healthy diet and get plenty of physical activity. This can help you lose weight if you are overweight. But eating well and being active are also good for your overall health. Even gentle activity, like walking, has benefits.  If you " smoke, quitting can also lower your risk of type 2 diabetes. Quitting smoking can be difficult, but your doctor or nurse can help.  All topics are updated as new evidence becomes available and our peer review process is complete.  This topic retrieved from [employer] on: Apr 24, 2024.  Topic 84258 Version 23.0  Release: 32.3.2 - C32.113  © 2024 UpToDate, Inc. and/or its affiliates. All rights reserved.  Consumer Information Use and Disclaimer   Disclaimer: This generalized information is a limited summary of diagnosis, treatment, and/or medication information. It is not meant to be comprehensive and should be used as a tool to help the user understand and/or assess potential diagnostic and treatment options. It does NOT include all information about conditions, treatments, medications, side effects, or risks that may apply to a specific patient. It is not intended to be medical advice or a substitute for the medical advice, diagnosis, or treatment of a health care provider based on the health care provider's examination and assessment of a patient's specific and unique circumstances. Patients must speak with a health care provider for complete information about their health, medical questions, and treatment options, including any risks or benefits regarding use of medications. This information does not endorse any treatments or medications as safe, effective, or approved for treating a specific patient. [employer], Inc. and its affiliates disclaim any warranty or liability relating to this information or the use thereof.The use of this information is governed by the Terms of Use, available at https://www.wolters7 Oaks Pharmaceuticaluwer.com/en/know/clinical-effectiveness-terms. 2024© Up[employer]Date, Inc. and its affiliates and/or licensors. All rights reserved.  Copyright   © 2024 [employer]Date, Inc. and/or its affiliates. All rights reserved.    Patient Education     Foot care for people with diabetes   The Basics   Written by the doctors and  "editors at [employer]   Why is foot care important if I have diabetes? -- Diabetes can cause nerve damage if your blood sugar is high for a long time. The medical term for this is \"diabetic neuropathy.\"  If you have problems with the nerves in your feet, you might not be able to feel pain in your foot. Normally, people feel pain when they get a cut or a blister on their foot. The pain tells them that they need to treat their cut so it can heal. But people with nerve damage might not feel any pain when their feet get hurt. They might not even know that they have a cut, so they might not treat it. Problems that aren't treated right away can get much worse. For example, an untreated cut can get infected and turn into an open sore.  High blood sugar can also damage blood vessels and decrease blood flow to your feet. This can weaken your skin and make wounds take longer to heal. You are also more likely to get an infection if you have high blood sugar.  How do I take care of my feet? -- Taking good care of your feet can help prevent foot problems. You should:   Wash your feet every day with soap and warm water. Pat your feet dry, and be sure to dry the skin between your toes.   Keep your feet moisturized. Put lotion on the tops and bottoms of your feet, but not between your toes.   Check your feet every day (figure 1). Look for cuts, blisters, redness, or swelling. Use a mirror, or ask someone to help you check the bottoms of your feet. Check all parts of the foot, especially between the toes. Look for broken skin, ulcers, blisters, or redness.   Trim your toenails straight across when needed (figure 2). Do not cut the corners of your toenails. File rough edges. Do not cut your cuticles. Ask for help if you cannot see well or have problems reaching your feet.   Ask your doctor or nurse to check your feet at each visit. Take your shoes and socks off for these checks.   See a foot care provider (such as a podiatrist) if you " have an ingrown toenail, corn, or callus. Do not try to remove corns and calluses yourself.  How do I protect my feet from injury? -- There are several ways to protect your feet. You can:   Wear shoes and socks at all times, even at home. Do not walk barefoot. Wear swim shoes if you go to the beach or a swimming pool.   Choose shoes that fit well. They should not be not too tight or too loose. Your shoes should have plenty of room for your toes (figure 3). Your doctor might give you a prescription for special shoes. Check to see if they are covered by your insurance.   Check your shoes each time before you put them on to make sure that the lining is smooth. Also check to make sure that there is nothing inside the shoes before putting them on.   Do not wear shoes that expose any part of the foot, like sandals, thongs, or clogs.   Wear cotton socks that fit loosely. Do not wear shoes without socks.   Protect your feet from heat and cold. Test bath water before putting your feet in it to make sure that it is not too hot. Do not walk barefoot on hot ground. Take extra care when going outside in the cold and wear warm socks.  What else should I know? -- You can lower your risk for foot problems by keeping your blood sugar levels as close to your goal as possible. Other things you can do include:   Move your ankles and toes often to help with blood flow. You can wear a support stocking to help with swelling.   Walk often. Regular walking helps blood flow.   If you smoke, try to quit. Your doctor or nurse can help. Smoking causes poor blood flow to your feet and can damage your nerves.  When should I call the doctor? -- Call your doctor or nurse for advice if you have:   A fever of 100.4°F (38°C) or higher, chills, or a wound that will not heal   Swelling, redness, warmth around a wound, a foul smell coming from a wound, or yellowish, greenish, or bloody discharge   Sores or blisters on your feet that hurt more or less  than you would expect   Numbness or tingling in your foot or leg   Corns, calluses, blisters, or new sores on your foot   Very dry, scaly, or cracked skin on your feet   Changes in the way your foot joints or arch look  All topics are updated as new evidence becomes available and our peer review process is complete.  This topic retrieved from [employer] on: Mar 13, 2024.  Topic 622799 Version 2.0  Release: 32.2.4 - C32.71  © 2024 UpToDate, Inc. and/or its affiliates. All rights reserved.  figure 1: Foot check for people with diabetes     People with diabetes should check both of their feet every day. It is important to check your feet all over, including in between your toes. If you can't see the bottom of your foot, use a mirror or ask another person to check for you. Let your doctor or nurse know if you find any:  Redness   Cuts or cracks in the skin   Blisters   Swelling   Graphic 31524 Version 3.0  figure 2: Trim your toenails     Trim your toenails straight across and smooth them with a nail file.  Graphic 43634 Version 2.0  figure 3: Correct shoe shape     Choose shoes that fit the right way and are not too tight or too loose. Your shoes should have plenty of room for your toes.  Graphic 59613 Version 2.0  Consumer Information Use and Disclaimer   Disclaimer: This generalized information is a limited summary of diagnosis, treatment, and/or medication information. It is not meant to be comprehensive and should be used as a tool to help the user understand and/or assess potential diagnostic and treatment options. It does NOT include all information about conditions, treatments, medications, side effects, or risks that may apply to a specific patient. It is not intended to be medical advice or a substitute for the medical advice, diagnosis, or treatment of a health care provider based on the health care provider's examination and assessment of a patient's specific and unique circumstances. Patients must speak with  a health care provider for complete information about their health, medical questions, and treatment options, including any risks or benefits regarding use of medications. This information does not endorse any treatments or medications as safe, effective, or approved for treating a specific patient. [employer], Inc[employer] and its affiliates disclaim any warranty or liability relating to this information or the use thereof.The use of this information is governed by the Terms of Use, available at https://www.woltersOriginaluwer.com/en/know/clinical-effectiveness-terms. 2024© [employer], Inc. and its affiliates and/or licensors. All rights reserved.  Copyright   © 2024 [employer], Inc[employer] and/or its affiliates. All rights reserved.

## 2025-03-19 NOTE — PROGRESS NOTES
Adult Annual Physical  Name: Marisabel Reinoso      : 1960      MRN: [id number]  Encounter Provider: GIANLUCA De Oliveira  Encounter Date: 3/19/2025   Encounter department: [facility]    Assessment & Plan  Type 2 diabetes mellitus with other circulatory complication, with long-term current use of insulin (HCC)    Lab Results   Component Value Date    HGBA1C 8.2 (A) 2025     Orders:    POCT hemoglobin A1c    CBC and differential; Future    Albumin / creatinine urine ratio; Future    Comprehensive metabolic panel; Future    Hemoglobin A1C; Future    Lipid Panel with Direct LDL reflex; Future    TSH, 3rd generation with Free T4 reflex; Future    Annual physical exam    Orders:    CBC and differential; Future    Albumin / creatinine urine ratio; Future    Comprehensive metabolic panel; Future    Hemoglobin A1C; Future    Lipid Panel with Direct LDL reflex; Future    TSH, 3rd generation with Free T4 reflex; Future    Acute upper respiratory infection    Orders:    doxycycline hyclate (VIBRA-TABS) 100 mg tablet; Take 1 tablet (100 mg total) by mouth 2 (two) times a day for 7 days    CBC and differential; Future    Albumin / creatinine urine ratio; Future    Comprehensive metabolic panel; Future    Hemoglobin A1C; Future    Lipid Panel with Direct LDL reflex; Future    TSH, 3rd generation with Free T4 reflex; Future      Preventive Screenings:  - Diabetes Screening: screening not indicated and has diabetes  - Cholesterol Screening: risks/benefits discussed and orders placed   - Hepatitis C screening: screening up-to-date   - HIV screening: screening up-to-date   - Breast cancer screening: risks/benefits discussed and orders placed   - Lung cancer screening: screening not indicated     BMI Counseling: Body mass index is 41.81 kg/m². The BMI is above normal. Exercise recommendations include exercising 3-5 times per week and strength training exercises.     Depression Screening and Follow-up Plan:  Patient's depression screening was positive with a PHQ-9 score of 6.   Patient assessed for underlying major depression. Brief counseling provided and recommend additional follow-up/re-evaluation next office visit. Patient with underlying depression and was advised to continue current medications as prescribed.         History of Present Illness     Adult Annual Physical:  Patient presents for annual physical. Here for wellness exam  Also c/o cold symptoms  Started 5 days  Congestion  Now going into chest  Cough  Fatigue  No fevers     father had amputation   is upset  Living with grandson who is special needs and is a handful  Now may have to babysit her other grandchildren age 2 and 4 year old all the time  A lot of stress at home    .     Diet and Physical Activity:  - Diet/Nutrition: diabetic diet.  - Exercise: no formal exercise.    Depression Screening:    - PHQ-9 Score: 6    General Health:  - Sleep: sleeps well.  - Hearing: normal hearing right ear and normal hearing left ear.  - Vision: no vision problems.  - Dental: regular dental visits and brushes teeth once daily.    /GYN Health:  - Follows with GYN: no.   - Menopause: postmenopausal.   - History of STDs: no    Advanced Care Planning:  - Has an advanced directive?: no    - Has a durable medical POA?: no    - ACP document given to patient?: no      Review of Systems   Constitutional:  Positive for fatigue. Negative for fever.   HENT:  Positive for congestion, postnasal drip, rhinorrhea and sore throat.    Eyes:  Negative for photophobia and visual disturbance.   Respiratory:  Positive for cough. Negative for shortness of breath and wheezing.    Cardiovascular:  Negative for chest pain and palpitations.   Gastrointestinal:  Negative for constipation, diarrhea, nausea and vomiting.   Genitourinary:  Negative for dysuria and frequency.   Musculoskeletal:  Negative for arthralgias and myalgias.   Neurological:  Negative for dizziness,  "light-headedness and headaches.   Hematological:  Negative for adenopathy.   Psychiatric/Behavioral:  Positive for dysphoric mood. Negative for sleep disturbance. The patient is nervous/anxious.          Objective   /90 (BP Location: Left arm, Patient Position: Sitting, Cuff Size: Standard)   Pulse 87   Temp 98.1 °F (36.7 °C) (Tympanic)   Resp 16   Ht 5' 2.6\" (1.59 m)   Wt 106 kg (233 lb)   LMP  (LMP Unknown)   SpO2 98%   BMI 41.81 kg/m²     Physical Exam  Vitals and nursing note reviewed.   Constitutional:       Appearance: Normal appearance.   HENT:      Head: Normocephalic and atraumatic.      Right Ear: Tympanic membrane, ear canal and external ear normal.      Left Ear: Tympanic membrane, ear canal and external ear normal.      Nose: Nose normal.      Mouth/Throat:      Mouth: Mucous membranes are moist.   Eyes:      Conjunctiva/sclera: Conjunctivae normal.   Cardiovascular:      Rate and Rhythm: Normal rate and regular rhythm.      Pulses: Normal pulses.      Heart sounds: Normal heart sounds.   Pulmonary:      Effort: Pulmonary effort is normal.      Breath sounds: Normal breath sounds.   Abdominal:      General: Bowel sounds are normal.   Musculoskeletal:         General: Normal range of motion.      Cervical back: Normal range of motion and neck supple.   Skin:     General: Skin is warm.      Capillary Refill: Capillary refill takes less than 2 seconds.   Neurological:      General: No focal deficit present.      Mental Status: She is alert and oriented to person, place, and time.   Psychiatric:         Mood and Affect: Mood normal.         Behavior: Behavior normal.         Thought Content: Thought content normal.         Judgment: Judgment normal.     BMI Counseling: Body mass index is 41.81 kg/m². The BMI is above normal. Nutrition recommendations include reducing portion sizes, decreasing overall calorie intake, 3-5 servings of fruits/vegetables daily, reducing fast food intake, consuming " healthier snacks, decreasing soda and/or juice intake, moderation in carbohydrate intake, increasing intake of lean protein, reducing intake of saturated fat and trans fat, and reducing intake of cholesterol. Exercise recommendations include moderate aerobic physical activity for 150 minutes/week, exercising 3-5 times per week, and strength training exercises.

## 2025-03-19 NOTE — ASSESSMENT & PLAN NOTE
Lab Results   Component Value Date    HGBA1C 8.2 (A) 03/21/2025     Orders:    POCT hemoglobin A1c    CBC and differential; Future    Albumin / creatinine urine ratio; Future    Comprehensive metabolic panel; Future    Hemoglobin A1C; Future    Lipid Panel with Direct LDL reflex; Future    TSH, 3rd generation with Free T4 reflex; Future

## 2025-03-21 LAB — SL AMB POCT HEMOGLOBIN AIC: 8.2 (ref ?–6.5)

## 2025-03-24 ENCOUNTER — TELEPHONE (OUTPATIENT)
Dept: FAMILY MEDICINE CLINIC | Facility: CLINIC | Age: 65
End: 2025-03-24

## 2025-03-24 NOTE — TELEPHONE ENCOUNTER
Left detailed message for patient regarding her POCT A1C results will be removed and and corrected within 24-48 hours in her chart.

## 2025-04-04 DIAGNOSIS — F32.A ANXIETY AND DEPRESSION: ICD-10-CM

## 2025-04-04 DIAGNOSIS — F41.9 ANXIETY AND DEPRESSION: ICD-10-CM

## 2025-04-18 ENCOUNTER — TELEPHONE (OUTPATIENT)
Age: 65
End: 2025-04-18

## 2025-04-18 NOTE — TELEPHONE ENCOUNTER
Pt requested refill for meds for the upper respiratory infection from last month as it has returned. Please send to:   [facility] [address] [place] [address], [place] - [address] [facility]       Please contact pt and let her know if PCP approved. Thank you.

## 2025-05-28 DIAGNOSIS — E03.9 ACQUIRED HYPOTHYROIDISM: ICD-10-CM

## 2025-05-29 RX ORDER — LEVOTHYROXINE SODIUM 50 UG/1
50 TABLET ORAL EVERY MORNING
Qty: 90 TABLET | Refills: 1 | Status: SHIPPED | OUTPATIENT
Start: 2025-05-29

## 2025-06-05 ENCOUNTER — VBI (OUTPATIENT)
Dept: ADMINISTRATIVE | Facility: OTHER | Age: 65
End: 2025-06-05

## 2025-06-05 NOTE — TELEPHONE ENCOUNTER
06/05/25 12:43 PM     Chart reviewed for CRC: Colonoscopy ; nothing is submitted to the patient's insurance at this time.     Daylin Russo   [facility]